# Patient Record
Sex: MALE | Race: WHITE | NOT HISPANIC OR LATINO | Employment: FULL TIME | URBAN - METROPOLITAN AREA
[De-identification: names, ages, dates, MRNs, and addresses within clinical notes are randomized per-mention and may not be internally consistent; named-entity substitution may affect disease eponyms.]

---
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
## 2018-04-25 ENCOUNTER — OFFICE VISIT (OUTPATIENT)
Dept: FAMILY MEDICINE | Facility: CLINIC | Age: 18
End: 2018-04-25
Payer: COMMERCIAL

## 2018-04-25 VITALS
SYSTOLIC BLOOD PRESSURE: 120 MMHG | HEART RATE: 89 BPM | RESPIRATION RATE: 20 BRPM | DIASTOLIC BLOOD PRESSURE: 71 MMHG | HEIGHT: 70 IN | TEMPERATURE: 98.6 F | BODY MASS INDEX: 20.33 KG/M2 | OXYGEN SATURATION: 96 % | WEIGHT: 142 LBS

## 2018-04-25 DIAGNOSIS — H10.13 ALLERGIC CONJUNCTIVITIS, BILATERAL: ICD-10-CM

## 2018-04-25 DIAGNOSIS — J30.1 CHRONIC SEASONAL ALLERGIC RHINITIS DUE TO POLLEN: Primary | ICD-10-CM

## 2018-04-25 PROCEDURE — 99213 OFFICE O/P EST LOW 20 MIN: CPT | Performed by: NURSE PRACTITIONER

## 2018-04-25 RX ORDER — FLUTICASONE PROPIONATE 50 MCG
2 SPRAY, SUSPENSION (ML) NASAL DAILY
Qty: 16 G | Refills: 6 | Status: SHIPPED | OUTPATIENT
Start: 2018-04-25 | End: 2020-04-09

## 2018-04-25 RX ORDER — OLOPATADINE HYDROCHLORIDE 2 MG/ML
1 SOLUTION/ DROPS OPHTHALMIC DAILY
Qty: 1 BOTTLE | Refills: 6 | Status: SHIPPED | OUTPATIENT
Start: 2018-04-25 | End: 2020-04-09

## 2018-04-25 ASSESSMENT — PAIN SCALES - GENERAL: PAINLEVEL: NO PAIN (0)

## 2018-04-25 NOTE — PROGRESS NOTES
SUBJECTIVE:   Iker dEge is a 18 year old male who presents to clinic today for the following health issues:      Chief Complaint   Patient presents with     Allergies     spring allergies             Problem list and histories reviewed & adjusted, as indicated.  Additional history: states he has spring allergies which are starting. Reports when things start to bloom he gets a runny nose which Flonase helps and itchy, red eyes which Patanol helps.  Reports using these two for the past 2 years and is hopeful this year they will work again.  He would consider seeing allergist if any further concerns.  Feels fine otherwise and offers no other worries.  He works as a cook.        Patient Active Problem List   Diagnosis     Epidermal inclusion cyst     Allergic conjunctivitis, bilateral     Seasonal allergic rhinitis     Past Surgical History:   Procedure Laterality Date     SURGICAL HISTORY OF -       bilateral myringotomy tubes       Social History   Substance Use Topics     Smoking status: Never Smoker     Smokeless tobacco: Never Used      Comment: no tobacco exposure     Alcohol use No     Family History   Problem Relation Age of Onset     Thyroid Disease Mother      has 1/2 thyroid     GASTROINTESTINAL DISEASE Mother      ulcerative colitis     Eye Disorder Paternal Grandmother      cataracts     Anemia Paternal Grandmother          Current Outpatient Prescriptions   Medication Sig Dispense Refill     fluticasone (FLONASE) 50 MCG/ACT spray Spray 2 sprays into both nostrils daily 16 g 6     ibuprofen 200 MG capsule Take 400 mg by mouth every 4 hours as needed for pain or fever 120 capsule      olopatadine HCl (PATADAY) 0.2 % SOLN Place 1 drop into both eyes daily 1 Bottle 6     No Known Allergies  BP Readings from Last 3 Encounters:   04/25/18 120/71   06/16/16 110/70   04/11/16 130/66    Wt Readings from Last 3 Encounters:   04/25/18 142 lb (64.4 kg)   06/16/16 137 lb (62.1 kg) (28 %)*   04/11/16 141 lb 6.4 oz  "(64.1 kg) (37 %)*     * Growth percentiles are based on [facility] 2-20 Years data.                    Reviewed and updated as needed this visit by clinical staff  Tobacco  Allergies  Meds  Med Hx  Surg Hx  Fam Hx  Soc Hx      Reviewed and updated as needed this visit by Provider          ROS: 10 point ROS neg other than the symptoms noted above in the HPI.    OBJECTIVE:     /71 (Cuff Size: Adult Regular)  Pulse 89  Temp 98.6  F (37  C) (Tympanic)  Resp 20  Ht 5' 9.5\" (1.765 m)  Wt 142 lb (64.4 kg)  SpO2 96%  BMI 20.67 kg/m2  Body mass index is 20.67 kg/(m^2).  GENERAL: healthy, alert and no distress  HENT: ear canals and TM's normal, pharynx without erythema  NECK: no adenopathy, no asymmetry  RESP: lungs clear to auscultation - no rales, rhonchi or wheezes  CV: regular rate and rhythm, normal S1 S2, no S3 or S4, no murmur  MS: no gross musculoskeletal defects noted      Diagnostic Test Results:  No results found for this or any previous visit (from the past 24 hour(s)).    ASSESSMENT/PLAN:             1. Chronic seasonal allergic rhinitis due to pollen    - fluticasone (FLONASE) 50 MCG/ACT spray; Spray 2 sprays into both nostrils daily  Dispense: 16 g; Refill: 6  Continue with previous successful treatment for seasonal spring allergies.   Follow up if symptoms persist or worsen and as needed.    2. Allergic conjunctivitis, bilateral    - olopatadine HCl (PATADAY) 0.2 % SOLN; Place 1 drop into both eyes daily  Dispense: 1 Bottle; Refill: 6    See Patient Instructions  Patient Instructions   Refills sent to pharmacy to address seasonal allergies.  If any further concerns with allergies, can see allergy specialist.  Follow up if symptoms persist or worsen and as needed.        Thank you for choosing [facility].  You may be receiving a survey in the mail from [employer] regarding your visit today.  Please take a few minutes to complete and return the survey to let us know how we are doing.      Our " Clinic hours are:  Mondays    7:20 am - 7 pm  Tues -  Fri  7:20 am - 5 pm    Clinic Phone: [phone]    The clinic lab opens at 7:30 am Mon - Fri and appointments are required.    [facility]  Ph. [phone]  Monday-Thursday 8 am - 7pm  Tues/Wed/Fri 8 am - 5:30 pm             CHRISTIAN Hamm CNP  [facility]

## 2018-04-25 NOTE — MR AVS SNAPSHOT
After Visit Summary   4/25/2018    Iker Edge    MRN: [id number]           Patient Information     Date Of Birth          2000        Visit Information        Provider Department      4/25/2018 11:40 AM Camille Edmonds APRN CNP [facility]        Today's Diagnoses     Chronic seasonal allergic rhinitis due to pollen    -  1    Allergic conjunctivitis, bilateral          Care Instructions    Refills sent to pharmacy to address seasonal allergies.  If any further concerns with allergies, can see allergy specialist.  Follow up if symptoms persist or worsen and as needed.        Thank you for choosing [facility].  You may be receiving a survey in the mail from [employer] regarding your visit today.  Please take a few minutes to complete and return the survey to let us know how we are doing.      Our Clinic hours are:  Mondays    7:20 am - 7 pm  Tues -  Fri  7:20 am - 5 pm    Clinic Phone: [phone]    The clinic lab opens at 7:30 am Mon - Fri and appointments are required.    [facility]  Ph. [phone]  Monday-Thursday 8 am - 7pm  Tues/Wed/Fri 8 am - 5:30 pm                 Follow-ups after your visit        Who to contact     If you have questions or need follow up information about today's clinic visit or your schedule please contact [facility] directly at [phone].  Normal or non-critical lab and imaging results will be communicated to you by MyChart, letter or phone within 4 business days after the clinic has received the results. If you do not hear from us within 7 days, please contact the clinic through MyChart or phone. If you have a critical or abnormal lab result, we will notify you by phone as soon as possible.  Submit refill requests through [employer] or call your pharmacy and they will forward the refill request to us. Please allow 3 business days for your refill to be completed.          Additional Information  "About Your Visit        QuantuModelinghart Information     [employer]omat lets you send messages to your doctor, view your test results, renew your prescriptions, schedule appointments and more. To sign up, go to [email or web address] . Click on \"Log in\" on the left side of the screen, which will take you to the Welcome page. Then click on \"Sign up Now\" on the right side of the page.     You will be asked to enter the access code listed below, as well as some personal information. Please follow the directions to create your username and password.     Your access code is: [email or web address]  Expires: 2018 12:00 PM     Your access code will  in 90 days. If you need help or a new code, please call your [place] clinic or [phone].        Care EveryWhere ID     This is your Care EveryWhere ID. This could be used by other organizations to access your [place] medical records  [id number]        Your Vitals Were     Pulse Temperature Respirations Height Pulse Oximetry BMI (Body Mass Index)    89 98.6  F (37  C) (Tympanic) 20 5' 9.5\" (1.765 m) 96% 20.67 kg/m2       Blood Pressure from Last 3 Encounters:   18 120/71   16 110/70   16 130/66    Weight from Last 3 Encounters:   18 142 lb (64.4 kg)   16 137 lb (62.1 kg) (28 %)*   16 141 lb 6.4 oz (64.1 kg) (37 %)*     * Growth percentiles are based on CDC 2-20 Years data.              Today, you had the following     No orders found for display         Where to get your medicines      These [facility] [address]  [address], [place] [address]    Hours:  [facility] Phone:  [phone]     fluticasone 50 MCG/ACT spray    olopatadine HCl 0.2 % Soln          Primary Care Provider Office Phone # Fax #    Paul Pacheco [phone] [address] [facility] [address]        Equal Access to Services     SOFYA" GAAR : Hadii edison page rodriguez Olson, waaxda luqadaha, qaybta kaalmada pj, stanford kelsey leopearl berman duncanconnie espinoza. So [facility] [phone].    ATENCIÓN: Si habla español, tiene a estevez disposición servicios gratuitos de asistencia lingüística. Llame al [phone].    We comply with applicable federal civil rights laws and [place] laws. We do not discriminate on the basis of race, color, national origin, age, disability, sex, sexual orientation, or gender identity.            Thank you!     Thank you for choosing [facility]  for your care. Our goal is always to provide you with excellent care. Hearing back from our patients is one way we can continue to improve our services. Please take a few minutes to complete the written survey that you may receive in the mail after your visit with us. Thank you!             Your Updated Medication List - Protect others around you: Learn how to safely use, store and throw away your medicines at www.disposemymeds.org.          This list is accurate as of 4/25/18 12:00 PM.  Always use your most recent med list.                   Brand Name Dispense Instructions for use Diagnosis    fluticasone 50 MCG/ACT spray    FLONASE    16 g    Spray 2 sprays into both nostrils daily    Chronic seasonal allergic rhinitis due to pollen       ibuprofen 200 MG capsule     120 capsule    Take 400 mg by mouth every 4 hours as needed for pain or fever        olopatadine HCl 0.2 % Soln    PATADAY    1 Bottle    Place 1 drop into both eyes daily    Allergic conjunctivitis, bilateral

## 2018-04-25 NOTE — PATIENT INSTRUCTIONS
Refills sent to pharmacy to address seasonal allergies.  If any further concerns with allergies, can see allergy specialist.  Follow up if symptoms persist or worsen and as needed.        Thank you for choosing [facility].  You may be receiving a survey in the mail from [employer] regarding your visit today.  Please take a few minutes to complete and return the survey to let us know how we are doing.      Our Clinic hours are:  Mondays    7:20 am - 7 pm  Tues -  Fri  7:20 am - 5 pm    Clinic Phone: [phone]    The clinic lab opens at 7:30 am Mon - Fri and appointments are required.    [facility]  Ph. [phone]  Monday-Thursday 8 am - 7pm  Tues/Wed/Fri 8 am - 5:30 pm

## 2018-06-14 ENCOUNTER — OFFICE VISIT (OUTPATIENT)
Dept: FAMILY MEDICINE | Facility: CLINIC | Age: 18
End: 2018-06-14
Payer: COMMERCIAL

## 2018-06-14 ENCOUNTER — TELEPHONE (OUTPATIENT)
Dept: OTHER | Facility: CLINIC | Age: 18
End: 2018-06-14

## 2018-06-14 VITALS
BODY MASS INDEX: 20.73 KG/M2 | SYSTOLIC BLOOD PRESSURE: 117 MMHG | TEMPERATURE: 96.5 F | WEIGHT: 144.8 LBS | DIASTOLIC BLOOD PRESSURE: 68 MMHG | HEART RATE: 57 BPM | HEIGHT: 70 IN

## 2018-06-14 DIAGNOSIS — I83.92 VARICOSE VEINS OF LEFT LOWER EXTREMITY: Primary | ICD-10-CM

## 2018-06-14 PROCEDURE — 99214 OFFICE O/P EST MOD 30 MIN: CPT | Performed by: FAMILY MEDICINE

## 2018-06-14 NOTE — PROGRESS NOTES
SUBJECTIVE:   Iker Edge is a 18 year old male who presents to clinic today for the following health issues:  Chief Complaint   Patient presents with     Musculoskeletal Problem     Pt here for left foot swelling and discoloration.       Joint Pain    Onset: Pt has always had some discoloration and swelling but has gotten progressively worse over the past year.     Description:   Location: left foot and ankle  Character: no pain at all    Intensity: no pain     Progression of Symptoms: worse    Accompanying Signs & Symptoms:  Other symptoms: swelling and discoloration of ankle and foot    History:   Previous similar pain: YES- pt has had forever but not to this extreme      Precipitating factors:   Trauma or overuse: no     Alleviating factors:  Improved by: nothing    Therapies Tried and outcome: none    Verified above history with patient.    Patient denies skin ulcer, foot/leg pain, chest pain or dyspnea.    Patient had normal venous duplex US in 1/2016.    Problem list and histories reviewed & adjusted, as indicated.  Additional history: as documented    Patient Active Problem List   Diagnosis     Epidermal inclusion cyst     Allergic conjunctivitis, bilateral     Seasonal allergic rhinitis     Past Surgical History:   Procedure Laterality Date     SURGICAL HISTORY OF -       bilateral myringotomy tubes       Social History   Substance Use Topics     Smoking status: Never Smoker     Smokeless tobacco: Never Used      Comment: no tobacco exposure     Alcohol use No     Family History   Problem Relation Age of Onset     Thyroid Disease Mother      has 1/2 thyroid     GASTROINTESTINAL DISEASE Mother      ulcerative colitis     Eye Disorder Paternal Grandmother      cataracts     Anemia Paternal Grandmother          Current Outpatient Prescriptions   Medication Sig Dispense Refill     fluticasone (FLONASE) 50 MCG/ACT spray Spray 2 sprays into both nostrils daily 16 g 6     olopatadine HCl (PATADAY) 0.2 % SOLN  "Place 1 drop into both eyes daily 1 Bottle 6     order for DME Equipment being ordered: one pair compression stockings (20-30 pressure) 1 each 1     ibuprofen 200 MG capsule Take 400 mg by mouth every 4 hours as needed for pain or fever 120 capsule      No Known Allergies    Reviewed and updated as needed this visit by clinical staff  Tobacco  Allergies  Meds  Problems  Med Hx  Surg Hx  Fam Hx  Soc Hx        Reviewed and updated as needed this visit by Provider  Allergies  Meds  Problems         ROS:  C: NEGATIVE for fever, chills, change in weight  I: see above  MUSCULOSKELETAL:see above  N: NEGATIVE for weakness, dizziness or paresthesias  H: NEGATIVE for bleeding problems    OBJECTIVE:                                                    /68  Pulse 57  Temp 96.5  F (35.8  C) (Tympanic)  Ht 5' 9.5\" (1.765 m)  Wt 144 lb 12.8 oz (65.7 kg)  BMI 21.08 kg/m2  Body mass index is 21.08 kg/(m^2).  GEN: alert, oriented x 3, NAD  SKIN: no ulcer; light purplish discoloration of the left foot (all toes and lateral half)  LEFT FOOT: moderate painless swelling with no pitting; moderate sized non-tender varicose veins mostly on lateral foot and lateral ankle; good pedal pulse; normal capillary refill time.  EXT: no pitting edema either side; no calf tenderness either side; right leg/foot with no varicose veins    Diagnostic test results:  Diagnostic Test Results:  none      ASSESSMENT/PLAN:                                                        ICD-10-CM    1. Varicose veins of left lower extremity I83.92 US Venous Competency Left     order for DME     VASCULAR SURGERY REFERRAL     Patient has reported progressively worsening varicose veins although painless  Suspect venous malformation vs venous incompetence.  Initial testing ordered as above. Consider MRI of foot.  Consult vasc surg clinic for further eval and management.  Compression stockings advised.  Elevate foot when resting.  Return precautions " discussed and given to patient.    Follow up with Provider - at [facility] clinic consult   Patient Instructions       To schedule the ultrasound of the left leg, call [phone].    Call vascular  at the number noted in the referral section of this summary.    Wear compression stockings daily - this prevents swelling and improves blood flow up the veins of the legs.      Thank you for choosing [facility].  You may be receiving a survey in the mail from [facility] regarding your visit today.  Please take a few minutes to complete and return the survey to let us know how we are doing.      If you have questions or concerns, please contact us via [employer] or you can contact your care team at [phone].    Our Clinic hours are:  Monday 6:40 am  to 7:00 pm  Tuesday -Friday 6:40 am to 5:00 pm    The [place] outpatient lab hours are:  Monday - Friday 6:10 am to 4:45 pm  Saturdays 7:00 am to 11:00 am  Appointments are required, call [phone]    If you have clinical questions after hours or would like to schedule an appointment,  call the clinic at [phone].    Self-Care for Spider and Varicose Veins  Your healthcare provider may suggest that you try self-care. Exercising and maintaining a healthy weight may keep problem veins from getting worse. Wearing elastic stockings and elevating your legs can help improve blood flow. Taking breaks when you sit or stand helps, too.     The top of the elastic stocking should be below the bend in your knee for a proper fit.   Exercising  Exercising is good for your veins because it improves blood flow. Walking, cycling, or swimming are great exercises for vein health. But be sure to check with your healthcare provider before starting any exercise program. Also, keep these hints in mind:    When exercising, start out slowly and try to build up to 30 minutes on most days.    Elevate your legs above heart level after exercise to keep blood from pooling in  veins.  Maintaining a healthy weight  Being overweight puts extra pressure on your veins. To maintain a healthy weight, try these tips:    Choose lean meats, fish, and skinless chicken.    Use low-fat dairy products.    Eat foods high in fiber, such as whole grains, fruits, and vegetables.    Cut down on sugar, salt, and saturated and trans fats.    Exercise regularly.  Wearing elastic stockings  Elastic stockings gently squeeze veins so blood flows upward. If you need elastic stockings, your healthcare provider can prescribe them for you. Follow your healthcare provider s advice about how and when to wear them. Elastic stockings come in several different levels of pressure. Ask your healthcare provider which level of pressure would benefit you the most.   Elevating your legs  Raising your legs above heart level will help relieve swelling and keep blood from pooling in veins. Try to elevate your legs for 15 to 20 minutes at the end of the day, and whenever you re relaxing. To make sure your legs are raised above heart level, prop them up on cushions or large pillows.  When sitting and standing  To keep blood moving when you have to sit or stand for long periods, try these tips:    At work, take walking breaks instead of coffee breaks. Walk during your lunch hour. Or try flexing your feet up and down 10 times each hour.    When standing, raise yourself up and down on your toes, or rock back and forth on your heels.  Date Last Reviewed: 5/1/2016 2000-2017 [employer]. [address], [place] [address]. All rights reserved. This information is not intended as a substitute for professional medical care. Always follow your healthcare professional's instructions.            Paul Pacheco MD  [facility]

## 2018-06-14 NOTE — PATIENT INSTRUCTIONS
To schedule the ultrasound of the left leg, call [phone].    Call vascular  at the number noted in the referral section of this summary.    Wear compression stockings daily - this prevents swelling and improves blood flow up the veins of the legs.      Thank you for choosing [facility].  You may be receiving a survey in the mail from Sasha Pedroza regarding your visit today.  Please take a few minutes to complete and return the survey to let us know how we are doing.      If you have questions or concerns, please contact us via [employer] or you can contact your care team at [phone].    Our Clinic hours are:  Monday 6:40 am  to 7:00 pm  Tuesday -Friday 6:40 am to 5:00 pm    The [place] outpatient lab hours are:  Monday - Friday 6:10 am to 4:45 pm  Saturdays 7:00 am to 11:00 am  Appointments are required, call [phone]    If you have clinical questions after hours or would like to schedule an appointment,  call the clinic at [phone].    Self-Care for Spider and Varicose Veins  Your healthcare provider may suggest that you try self-care. Exercising and maintaining a healthy weight may keep problem veins from getting worse. Wearing elastic stockings and elevating your legs can help improve blood flow. Taking breaks when you sit or stand helps, too.     The top of the elastic stocking should be below the bend in your knee for a proper fit.   Exercising  Exercising is good for your veins because it improves blood flow. Walking, cycling, or swimming are great exercises for vein health. But be sure to check with your healthcare provider before starting any exercise program. Also, keep these hints in mind:    When exercising, start out slowly and try to build up to 30 minutes on most days.    Elevate your legs above heart level after exercise to keep blood from pooling in veins.  Maintaining a healthy weight  Being overweight puts extra pressure on your veins. To maintain a healthy weight, try  these tips:    Choose lean meats, fish, and skinless chicken.    Use low-fat dairy products.    Eat foods high in fiber, such as whole grains, fruits, and vegetables.    Cut down on sugar, salt, and saturated and trans fats.    Exercise regularly.  Wearing elastic stockings  Elastic stockings gently squeeze veins so blood flows upward. If you need elastic stockings, your healthcare provider can prescribe them for you. Follow your healthcare provider s advice about how and when to wear them. Elastic stockings come in several different levels of pressure. Ask your healthcare provider which level of pressure would benefit you the most.   Elevating your legs  Raising your legs above heart level will help relieve swelling and keep blood from pooling in veins. Try to elevate your legs for 15 to 20 minutes at the end of the day, and whenever you re relaxing. To make sure your legs are raised above heart level, prop them up on cushions or large pillows.  When sitting and standing  To keep blood moving when you have to sit or stand for long periods, try these tips:    At work, take walking breaks instead of coffee breaks. Walk during your lunch hour. Or try flexing your feet up and down 10 times each hour.    When standing, raise yourself up and down on your toes, or rock back and forth on your heels.  Date Last Reviewed: 5/1/2016 2000-2017 [employer]. [address] [place] [address], [place] [address]. All rights reserved. This information is not intended as a substitute for professional medical care. Always follow your healthcare professional's instructions.

## 2018-06-14 NOTE — MR AVS SNAPSHOT
After Visit Summary   6/14/2018    Iker Edge    MRN: [id number]           Patient Information     Date Of Birth          2000        Visit Information        Provider Department      6/14/2018 8:20 AM Paul Pacheco MD [facility]        Today's Diagnoses     Varicose veins of left lower extremity    -  1      Care Instructions      To schedule the ultrasound of the left leg, call [phone].    Call vascular  at the number noted in the referral section of this summary.    Wear compression stockings daily - this prevents swelling and improves blood flow up the veins of the legs.      Thank you for choosing [facility] [place].  You may be receiving a survey in the mail from [employer] regarding your visit today.  Please take a few minutes to complete and return the survey to let us know how we are doing.      If you have questions or concerns, please contact us via [employer] or you can contact your care team at [phone].    Our Clinic hours are:  Monday 6:40 am  to 7:00 pm  Tuesday -Friday 6:40 am to 5:00 pm    The [place] outpatient lab hours are:  Monday - Friday 6:10 am to 4:45 pm  Saturdays 7:00 am to 11:00 am  Appointments are required, call [phone]    If you have clinical questions after hours or would like to schedule an appointment,  call the clinic at [phone].    Self-Care for Spider and Varicose Veins  Your healthcare provider may suggest that you try self-care. Exercising and maintaining a healthy weight may keep problem veins from getting worse. Wearing elastic stockings and elevating your legs can help improve blood flow. Taking breaks when you sit or stand helps, too.     The top of the elastic stocking should be below the bend in your knee for a proper fit.   Exercising  Exercising is good for your veins because it improves blood flow. Walking, cycling, or swimming are great exercises for vein health. But be sure to check with your  healthcare provider before starting any exercise program. Also, keep these hints in mind:    When exercising, start out slowly and try to build up to 30 minutes on most days.    Elevate your legs above heart level after exercise to keep blood from pooling in veins.  Maintaining a healthy weight  Being overweight puts extra pressure on your veins. To maintain a healthy weight, try these tips:    Choose lean meats, fish, and skinless chicken.    Use low-fat dairy products.    Eat foods high in fiber, such as whole grains, fruits, and vegetables.    Cut down on sugar, salt, and saturated and trans fats.    Exercise regularly.  Wearing elastic stockings  Elastic stockings gently squeeze veins so blood flows upward. If you need elastic stockings, your healthcare provider can prescribe them for you. Follow your healthcare provider s advice about how and when to wear them. Elastic stockings come in several different levels of pressure. Ask your healthcare provider which level of pressure would benefit you the most.   Elevating your legs  Raising your legs above heart level will help relieve swelling and keep blood from pooling in veins. Try to elevate your legs for 15 to 20 minutes at the end of the day, and whenever you re relaxing. To make sure your legs are raised above heart level, prop them up on cushions or large pillows.  When sitting and standing  To keep blood moving when you have to sit or stand for long periods, try these tips:    At work, take walking breaks instead of coffee breaks. Walk during your lunch hour. Or try flexing your feet up and down 10 times each hour.    When standing, raise yourself up and down on your toes, or rock back and forth on your heels.  Date Last Reviewed: 5/1/2016 2000-2017 [employer]. [address] [place] [address], [place] [address]. All rights reserved. This information is not intended as a substitute for professional medical care. Always follow your healthcare  professional's instructions.                Follow-ups after your visit        Additional Services     VASCULAR SURGERY REFERRAL       Your provider has referred you to: **Vascular [facility] Services [phone] - Varicose Veins & Other: venous competency already ordered and is pending at time of this referral    [email or web address]    Please be aware that coverage of these services is subject to the terms and limitations of your health insurance plan.  Call member services at your health plan with any benefit or coverage questions.      Please bring the following with you to your appointment:    (1) Any X-Rays, CTs or MRIs which have been performed.  Contact the facility where they were done to arrange for  prior to your scheduled appointment.    (2) List of current medications   (3) This referral request   (4) Any documents/labs given to you for this referral                  Follow-up notes from your care team     Return if symptoms worsen or fail to improve.      Future tests that were ordered for you today     Open Future Orders        Priority Expected Expires Ordered    US Venous Competency Left Routine  6/14/2019 6/14/2018            Who to contact     If you have questions or need follow up information about today's clinic visit or your schedule please contact [facility] directly at [phone].  Normal or non-critical lab and imaging results will be communicated to you by MyChart, letter or phone within 4 business days after the clinic has received the results. If you do not hear from us within 7 days, please contact the clinic through MyChart or phone. If you have a critical or abnormal lab result, we will notify you by phone as soon as possible.  Submit refill requests through [employer] or call your pharmacy and they will forward the refill request to us. Please allow 3 business days for your refill to be completed.          Additional Information  "About Your Visit        Care EveryWhere ID     This is your Care EveryWhere ID. This could be used by other organizations to access your [place] medical records  [id number]        Your Vitals Were     Pulse Temperature Height BMI (Body Mass Index)          57 96.5  F (35.8  C) (Tympanic) 5' 9.5\" (1.765 m) 21.08 kg/m2         Blood Pressure from Last 3 Encounters:   06/14/18 117/68   04/25/18 120/71   06/16/16 110/70    Weight from Last 3 Encounters:   06/14/18 144 lb 12.8 oz (65.7 kg)   04/25/18 142 lb (64.4 kg)   06/16/16 137 lb (62.1 kg) (28 %)*     * Growth percentiles are based on [facility] 2-20 Years data.              We Performed the Following     VASCULAR SURGERY REFERRAL          Today's Medication Changes          These changes are accurate as of 6/14/18  8:41 AM.  If you have any questions, ask your nurse or doctor.               Start taking these medicines.        Dose/Directions    order for DME   Used for:  Varicose veins of left lower extremity   Started by:  Paul Pacheco MD        Equipment being ordered: one pair compression stockings (20-30 pressure)   Quantity:  1 each   Refills:  1            Where to get your medicines      Some of these will need a paper prescription and others can be bought over the counter.  Ask your nurse if you have questions.     Bring a paper prescription for each of these medications     order for DME                Primary Care Provider Office Phone # Fax #    Paul Pacheco [phone] [address] [address]        Equal Access to Services     SOFYA ANDERSON : Hadii edison hookso Sojustin, waaxda luqadaha, qaybta kaalmada stanford oliva. So [facility] [phone].    ATENCIÓN: Si habla español, tiene a estevez disposición servicios gratuitos de asistencia lingüística. Llame al [phone].    We comply with applicable federal civil rights laws and [place] laws. We do " not discriminate on the basis of race, color, national origin, age, disability, sex, sexual orientation, or gender identity.            Thank you!     Thank you for choosing [facility]  for your care. Our goal is always to provide you with excellent care. Hearing back from our patients is one way we can continue to improve our services. Please take a few minutes to complete the written survey that you may receive in the mail after your visit with us. Thank you!             Your Updated Medication List - Protect others around you: Learn how to safely use, store and throw away your medicines at www.disposemymeds.org.          This list is accurate as of 6/14/18  8:41 AM.  Always use your most recent med list.                   Brand Name Dispense Instructions for use Diagnosis    fluticasone 50 MCG/ACT spray    FLONASE    16 g    Spray 2 sprays into both nostrils daily    Chronic seasonal allergic rhinitis due to pollen       ibuprofen 200 MG capsule     120 capsule    Take 400 mg by mouth every 4 hours as needed for pain or fever        olopatadine HCl 0.2 % Soln    PATADAY    1 Bottle    Place 1 drop into both eyes daily    Allergic conjunctivitis, bilateral       order for DME     1 each    Equipment being ordered: one pair compression stockings (20-30 pressure)    Varicose veins of left lower extremity

## 2018-06-14 NOTE — TELEPHONE ENCOUNTER
"Referral received via EPIC \"in box\", per  guidelines referral forwarded to [facility].    Harriett HASSANN, RN    "

## 2018-06-27 ENCOUNTER — HOSPITAL ENCOUNTER (OUTPATIENT)
Dept: ULTRASOUND IMAGING | Facility: CLINIC | Age: 18
Discharge: HOME | End: 2018-06-27
Attending: FAMILY MEDICINE | Admitting: FAMILY MEDICINE
Payer: COMMERCIAL

## 2018-06-27 DIAGNOSIS — I83.92 VARICOSE VEINS OF LEFT LOWER EXTREMITY: ICD-10-CM

## 2018-06-27 PROCEDURE — 93971 EXTREMITY STUDY: CPT | Mod: LT

## 2018-06-28 DIAGNOSIS — I87.2 VENOUS INSUFFICIENCY OF LEFT LEG: Primary | ICD-10-CM

## 2018-07-10 ENCOUNTER — OFFICE VISIT (OUTPATIENT)
Dept: VASCULAR SURGERY | Facility: CLINIC | Age: 18
End: 2018-07-10
Payer: COMMERCIAL

## 2018-07-10 VITALS — HEART RATE: 70 BPM | SYSTOLIC BLOOD PRESSURE: 116 MMHG | DIASTOLIC BLOOD PRESSURE: 74 MMHG | RESPIRATION RATE: 16 BRPM

## 2018-07-10 DIAGNOSIS — I87.2 VENOUS (PERIPHERAL) INSUFFICIENCY: ICD-10-CM

## 2018-07-10 DIAGNOSIS — I83.892 VARICOSE VEINS OF LEFT LEG WITH EDEMA: ICD-10-CM

## 2018-07-10 DIAGNOSIS — Q87.2 KLIPPEL TRENAUNAY SYNDROME: Primary | ICD-10-CM

## 2018-07-10 PROCEDURE — 99203 OFFICE O/P NEW LOW 30 MIN: CPT | Performed by: SURGERY

## 2018-07-10 NOTE — MR AVS SNAPSHOT
After Visit Summary   7/10/2018    Iker Edge    MRN: [id number]           Patient Information     Date Of Birth          2000        Visit Information        Provider Department      7/10/2018 9:30 AM Jamal Peralta MD [facility]        Today's Diagnoses     Klippel Trenaunay syndrome    -  1    Venous (peripheral) insufficiency        Varicose veins of left leg with edema           Follow-ups after your visit        Follow-up notes from your care team     Return if symptoms worsen or fail to improve.      Who to contact     If you have questions or need follow up information about today's clinic visit or your schedule please contact [facility] directly at [phone].  Normal or non-critical lab and imaging results will be communicated to you by MyChart, letter or phone within 4 business days after the clinic has received the results. If you do not hear from us within 7 days, please contact the clinic through MyChart or phone. If you have a critical or abnormal lab result, we will notify you by phone as soon as possible.  Submit refill requests through [employer] or call your pharmacy and they will forward the refill request to us. Please allow 3 business days for your refill to be completed.          Additional Information About Your Visit        Care EveryWhere ID     This is your Care EveryWhere ID. This could be used by other organizations to access your [place] medical records  [id number]        Your Vitals Were     Pulse Respirations                70 16           Blood Pressure from Last 3 Encounters:   07/10/18 116/74   06/14/18 117/68   04/25/18 120/71    Weight from Last 3 Encounters:   06/14/18 144 lb 12.8 oz (65.7 kg)   04/25/18 142 lb (64.4 kg)   06/16/16 137 lb (62.1 kg) (28 %)*     * Growth percentiles are based on CDC 2-20 Years data.              Today, you had the following     No orders found for display       Primary Care Provider Office  Phone # Fax #    Paul Pacheco [phone] [address] [facility] [address]        Equal Access to Services     SOFYA ANDERSON : Tristian Olson, warayshawnda florarogelioha, maureenta kajessda pj, stanford lenorain hayaapearl fernandovic abbott radha espinoza. So [facility] [phone].    ATENCIÓN: Si habla español, tiene a estevez disposición servicios gratuitos de asistencia lingüística. Llame al [phone].    We comply with applicable federal civil rights laws and [place] laws. We do not discriminate on the basis of race, color, national origin, age, disability, sex, sexual orientation, or gender identity.            Thank you!     Thank you for choosing [facility]  for your care. Our goal is always to provide you with excellent care. Hearing back from our patients is one way we can continue to improve our services. Please take a few minutes to complete the written survey that you may receive in the mail after your visit with us. Thank you!             Your Updated Medication List - Protect others around you: Learn how to safely use, store and throw away your medicines at www.disposemymeds.org.          This list is accurate as of 7/10/18 10:15 AM.  Always use your most recent med list.                   Brand Name Dispense Instructions for use Diagnosis    fluticasone 50 MCG/ACT spray    FLONASE    16 g    Spray 2 sprays into both nostrils daily    Chronic seasonal allergic rhinitis due to pollen       ibuprofen 200 MG capsule     120 capsule    Take 400 mg by mouth every 4 hours as needed for pain or fever        olopatadine HCl 0.2 % Soln    PATADAY    1 Bottle    Place 1 drop into both eyes daily    Allergic conjunctivitis, bilateral       order for DME     1 each    Equipment being ordered: one pair compression stockings (20-30 pressure)    Varicose veins of left lower extremity

## 2018-07-10 NOTE — LETTER
[facility] [place] [address] [place] [address]  [place] [address]  Phone: [phone]  Fax: [phone]    VASCULAR SURGERY CLINIC CONSULTATION   VASCULAR SURGEON: Jamal Peralta MD     LOCATION:  [facility]     Iker Edge   Medical Record #:  [id number]  YOB: 2000  Age:  18 year old      Date of Service: 7/10/2018     PRIMARY CARE PROVIDER: Paul Pacheco     Reason for visit:  Left leg varicose veins, port-wine stain and swelling.     IMPRESSION:  17 yo male with left leg hypertrophy, varicose veins and port wine stain consistent with Klippel Trenaunay Syndrome.     RECOMMENDATION:  I've discussed with Iker that this is most likely KTS since it's been present since birth and has the features of limb hypertrophy, varicose veins and port wine staining.  He was told at one point that this is most likely the diagnosis.  I've recommended to start with left thigh high compression 30-40 mmHg to be worn as much as possible to assist with the swelling.  I've also recommended that he may need to transition to waist high compression at some point if this doesn't provide adequate compression.  He would benefit from a left lower extremity MRI as well to evaluate for arterial venous malformations which would also be treated with compression and should be followed by a specialist in malformation treatment.  Dr. Brooks at [facility] does follow patients with malformations.  The long term treatment for his condition is compression therapy and only surgical intervention if he develops complications from his condition.       HPI:  Iker Edge is a 18 year old male who was seen today in consultation by Dr. Pacheco regarding left lower extremity swelling, port wine staining and varicose veins.  He has had this present since birth and was told at one point that this was most likely Klippel Trenaunay Syndrome (KTS).  He has worn  compression in the left lower leg his whole life with decent relief of the swelling.  Overall, he has minimal discomfort and no complications associated with his varicose veins.  He says his family history is significant for varicose veins in his mother and her side of the family.   No history of clotting disorders or DVT.      MEDS:    Current Outpatient Prescriptions:      fluticasone (FLONASE) 50 MCG/ACT spray, Spray 2 sprays into both nostrils daily, Disp: 16 g, Rfl: 6     ibuprofen 200 MG capsule, Take 400 mg by mouth every 4 hours as needed for pain or fever, Disp: 120 capsule, Rfl:      olopatadine HCl (PATADAY) 0.2 % SOLN, Place 1 drop into both eyes daily, Disp: 1 Bottle, Rfl: 6     order for DME, Equipment being ordered: one pair compression stockings (20-30 pressure), Disp: 1 each, Rfl: 1     EXAM:  GENERAL: This is a well-developed 18 year old male who appears his stated age  EYES: Grossly normal.  MOUTH: Buccal mucosa normal   CARDIAC:  NS1 S2, No Murmur  CHEST/LUNG:  Clear lung fields bilaterally   GASTROINTESINAL (ABDOMEN): Soft, non-tender, B/S present, no pulsatile mass  MUSCULOSKELETAL: Grossly normal and both lower extremities are intact.  HEME/LYMPH: No lymphedema.  NEUROLOGIC: Focally intact, Alert and oriented x 3.   PSYCH: appropriate affect  INTEGUMENT: No open lesions or ulcers.  He has hypertrophy of the left lower leg.  Large clusters of varicose veins in the left dorsal foot.  Port-wine staining of the plantar foot and 2-5 toes.  He also has port-wine stain on the posterior medial upper thigh.  There are some varicose veins scattered throughout the left posterior thigh < 3 mm in size.  No evidence of phlebitis or hemosiderin changes of the skin.  Pulse Exam:      DP:                Left 2                        Right  2      PT:                 Left 2                        Right  2     DIAGNOSTIC STUDIES:      Images:  Us Venous Competency Left     Result Date: 6/27/2018  VENOUS  ULTRASOUND LEG(S)  6/27/2018 10:14 AM HISTORY: Varicose veins of left lower extremity. COMPARISON: 1/5/2016 FINDINGS: Examination of the deep veins with graded compression and color flow Doppler with spectral wave form analysis was performed. Images show no evidence of thrombus in the left common femoral vein, femoral vein, popliteal vein or calf veins. There is deep venous insufficiency in the right common femoral, peripheral femoral and popliteal veins with a reflux time of up to 3.4 seconds. No superficial venous insufficiency in the left great saphenous vein or anterior accessory saphenous vein.      IMPRESSION: 1. No deep vein thrombosis in the left lower extremity. 2. Deep venous insufficiency in the left common femoral, peripheral femoral and popliteal veins. 3. No superficial venous insufficiency in the left lower extremity. DO Jamal RAMOS MD  VASCULAR SURGERY

## 2018-07-10 NOTE — PROGRESS NOTES
VASCULAR SURGERY CLINIC CONSULTATION    VASCULAR SURGEON: Jamal Peralta MD    LOCATION:  [facility]    Iker Edge   Medical Record #:  [id number]  YOB: 2000  Age:  18 year old     Date of Service: 7/10/2018    PRIMARY CARE PROVIDER: Paul Pacheco      Reason for visit:  Left leg varicose veins, port-wine stain and swelling.    IMPRESSION:  17 yo male with left leg hypertrophy, varicose veins and port wine stain consistent with Klippel Trenaunay Syndrome.    RECOMMENDATION:  I've discussed with Iker that this is most likely KTS since it's been present since birth and has the features of limb hypertrophy, varicose veins and port wine staining.  He was told at one point that this is most likely the diagnosis.  I've recommended to start with left thigh high compression 30-40 mmHg to be worn as much as possible to assist with the swelling.  I've also recommended that he may need to transition to waist high compression at some point if this doesn't provide adequate compression.  He would benefit from a left lower extremity MRI as well to evaluate for arterial venous malformations which would also be treated with compression and should be followed by a specialist in malformation treatment.  Dr. Brooks at [facility] does follow patients with malformations.  The long term treatment for his condition is compression therapy and only surgical intervention if he develops complications from his condition.      HPI:  Iker Edge is a 18 year old male who was seen today in consultation by Dr. Pacheco regarding left lower extremity swelling, port wine staining and varicose veins.  He has had this present since birth and was told at one point that this was most likely Klippel Trenaunay Syndrome (KTS).  He has worn compression in the left lower leg his whole life with decent relief of the swelling.  Overall, he has minimal discomfort and no  complications associated with his varicose veins.  He says his family history is significant for varicose veins in his mother and her side of the family.   No history of clotting disorders or DVT.     PHH:    Past Medical History:   Diagnosis Date     Unspecified asthma(493.90)     reactive airway        Past Surgical History:   Procedure Laterality Date     SURGICAL HISTORY OF -       bilateral myringotomy tubes       ALLERGIES:  Review of patient's allergies indicates no known allergies.    MEDS:    Current Outpatient Prescriptions:      fluticasone (FLONASE) 50 MCG/ACT spray, Spray 2 sprays into both nostrils daily, Disp: 16 g, Rfl: 6     ibuprofen 200 MG capsule, Take 400 mg by mouth every 4 hours as needed for pain or fever, Disp: 120 capsule, Rfl:      olopatadine HCl (PATADAY) 0.2 % SOLN, Place 1 drop into both eyes daily, Disp: 1 Bottle, Rfl: 6     order for DME, Equipment being ordered: one pair compression stockings (20-30 pressure), Disp: 1 each, Rfl: 1    SOCIAL HABITS:    History   Smoking Status     Never Smoker   Smokeless Tobacco     Never Used     Comment: no tobacco exposure       Alcohol use No     History   Drug Use No       FAMILY HISTORY:    Family History   Problem Relation Age of Onset     Thyroid Disease Mother      has 1/2 thyroid     GASTROINTESTINAL DISEASE Mother      ulcerative colitis     Eye Disorder Paternal Grandmother      cataracts     Anemia Paternal Grandmother        REVIEW OF SYSTEMS:    A 12 point ROS was reviewed and except for what is listed in the HPI above, all others are negative    PE:  /74 (BP Location: Left arm, Patient Position: Chair, Cuff Size: Adult Regular)  Pulse 70  Resp 16  Wt Readings from Last 1 Encounters:   06/14/18 144 lb 12.8 oz (65.7 kg)     There is no height or weight on file to calculate BMI.    EXAM:  GENERAL: This is a well-developed 18 year old male who appears his stated age  EYES: Grossly normal.  MOUTH: Buccal mucosa normal   CARDIAC:   NS1 S2, No Murmur  CHEST/LUNG:  Clear lung fields bilaterally   GASTROINTESINAL (ABDOMEN): Soft, non-tender, B/S present, no pulsatile mass  MUSCULOSKELETAL: Grossly normal and both lower extremities are intact.  HEME/LYMPH: No lymphedema.  NEUROLOGIC: Focally intact, Alert and oriented x 3.   PSYCH: appropriate affect  INTEGUMENT: No open lesions or ulcers.  He has hypertrophy of the left lower leg.  Large clusters of varicose veins in the left dorsal foot.  Port-wine staining of the plantar foot and 2-5 toes.  He also has port-wine stain on the posterior medial upper thigh.  There are some varicose veins scattered throughout the left posterior thigh < 3 mm in size.  No evidence of phlebitis or hemosiderin changes of the skin.  Pulse Exam:     DP: Left 2   Right  2     PT:   Left 2   Right  2          DIAGNOSTIC STUDIES:     Images:  Us Venous Competency Left    Result Date: 6/27/2018  VENOUS ULTRASOUND LEG(S)  6/27/2018 10:14 AM HISTORY: Varicose veins of left lower extremity. COMPARISON: 1/5/2016 FINDINGS: Examination of the deep veins with graded compression and color flow Doppler with spectral wave form analysis was performed. Images show no evidence of thrombus in the left common femoral vein, femoral vein, popliteal vein or calf veins. There is deep venous insufficiency in the right common femoral, peripheral femoral and popliteal veins with a reflux time of up to 3.4 seconds. No superficial venous insufficiency in the left great saphenous vein or anterior accessory saphenous vein.     IMPRESSION: 1. No deep vein thrombosis in the left lower extremity. 2. Deep venous insufficiency in the left common femoral, peripheral femoral and popliteal veins. 3. No superficial venous insufficiency in the left lower extremity. SNEHA NETTLES,     LABS:      Sodium   Date Value Ref Range Status   04/01/2008 143 133 - 143 mmol/L Final     Urea Nitrogen   Date Value Ref Range Status   04/01/2008 15 5 - 24 mg/dL Final      Hemoglobin   Date Value Ref Range Status   04/01/2008 14.2 11.7 - 15.7 g/dL Final   02/28/2005 12.4 10.5 - 14.0 g/dL Final     Platelet Count   Date Value Ref Range Status   04/01/2008 314 150 - 450 10e9/L Final   02/28/2005 300 150 - 450 10e9/L Final       Jamal Peralta MD  VASCULAR SURGERY

## 2018-10-24 ENCOUNTER — OFFICE VISIT (OUTPATIENT)
Dept: FAMILY MEDICINE | Facility: CLINIC | Age: 18
End: 2018-10-24
Payer: COMMERCIAL

## 2018-10-24 VITALS
TEMPERATURE: 96.9 F | OXYGEN SATURATION: 99 % | HEIGHT: 70 IN | RESPIRATION RATE: 16 BRPM | SYSTOLIC BLOOD PRESSURE: 138 MMHG | DIASTOLIC BLOOD PRESSURE: 74 MMHG | WEIGHT: 134.6 LBS | BODY MASS INDEX: 19.27 KG/M2 | HEART RATE: 58 BPM

## 2018-10-24 DIAGNOSIS — Z23 NEED FOR PROPHYLACTIC VACCINATION AND INOCULATION AGAINST INFLUENZA: ICD-10-CM

## 2018-10-24 DIAGNOSIS — F32.A ANXIETY AND DEPRESSION: Primary | ICD-10-CM

## 2018-10-24 DIAGNOSIS — F41.9 ANXIETY AND DEPRESSION: Primary | ICD-10-CM

## 2018-10-24 PROCEDURE — 90471 IMMUNIZATION ADMIN: CPT | Performed by: FAMILY MEDICINE

## 2018-10-24 PROCEDURE — 90686 IIV4 VACC NO PRSV 0.5 ML IM: CPT | Performed by: FAMILY MEDICINE

## 2018-10-24 PROCEDURE — 99214 OFFICE O/P EST MOD 30 MIN: CPT | Mod: 25 | Performed by: FAMILY MEDICINE

## 2018-10-24 ASSESSMENT — ANXIETY QUESTIONNAIRES
2. NOT BEING ABLE TO STOP OR CONTROL WORRYING: NEARLY EVERY DAY
GAD7 TOTAL SCORE: 14
6. BECOMING EASILY ANNOYED OR IRRITABLE: SEVERAL DAYS
5. BEING SO RESTLESS THAT IT IS HARD TO SIT STILL: NOT AT ALL
1. FEELING NERVOUS, ANXIOUS, OR ON EDGE: NEARLY EVERY DAY
IF YOU CHECKED OFF ANY PROBLEMS ON THIS QUESTIONNAIRE, HOW DIFFICULT HAVE THESE PROBLEMS MADE IT FOR YOU TO DO YOUR WORK, TAKE CARE OF THINGS AT HOME, OR GET ALONG WITH OTHER PEOPLE: VERY DIFFICULT
7. FEELING AFRAID AS IF SOMETHING AWFUL MIGHT HAPPEN: NEARLY EVERY DAY
3. WORRYING TOO MUCH ABOUT DIFFERENT THINGS: MORE THAN HALF THE DAYS

## 2018-10-24 ASSESSMENT — PATIENT HEALTH QUESTIONNAIRE - PHQ9: 5. POOR APPETITE OR OVEREATING: MORE THAN HALF THE DAYS

## 2018-10-24 NOTE — PROGRESS NOTES
SUBJECTIVE:   Iker Edge is a 18 year old male who presents to clinic today for the following health issues:      Abnormal Mood Symptoms  Onset: 2 years    Description:   Depression: YES  Anxiety: YES    Accompanying Signs & Symptoms:  Still participating in activities that you used to enjoy: no  Fatigue: YES- intermittent  Irritability: no  Difficulty concentrating: YES  Changes in appetite: YES  Problems with sleep: YES  Heart racing/beating fast : no  Thoughts of hurting yourself or others: none  Cries spontaneously: YES    History:   Recent stress: YES  Prior depression hospitalization: None  Family history of depression: YES- mother  Family history of anxiety: YES- mother    Precipitating factors:   Alcohol/drug use: no    Alleviating factors:  none    Therapies Tried and outcome: None    Further hx obtained by provider:  Patient reports he has difficulty talking about his feelings/emotions.  He states been worsening in the last year.  Patient denies any significant life event.  Patient reports he is happy with his job.  Patient states he has confided with his friend and mother, but nobody else.  He also states he has only been to counseling for about 2 months in 10th grade for mood symptoms.  Patient denies being on antidepressant in the past.  He denies any recurreing theme of worries or triggers for his depressed mood.    Problem list and histories reviewed & adjusted, as indicated.  Additional history: as documented    Patient Active Problem List   Diagnosis     Epidermal inclusion cyst     Allergic conjunctivitis, bilateral     Seasonal allergic rhinitis     Klippel Trenaunay syndrome     Venous (peripheral) insufficiency     Varicose veins of left leg with edema     Past Surgical History:   Procedure Laterality Date     SURGICAL HISTORY OF -       bilateral myringotomy tubes       Social History   Substance Use Topics     Smoking status: Never Smoker     Smokeless tobacco: Never Used      Comment: no  "tobacco exposure     Alcohol use No     Family History   Problem Relation Age of Onset     Thyroid Disease Mother      has 1/2 thyroid     GASTROINTESTINAL DISEASE Mother      ulcerative colitis     Eye Disorder Paternal Grandmother      cataracts     Anemia Paternal Grandmother          Current Outpatient Prescriptions   Medication Sig Dispense Refill     ibuprofen 200 MG capsule Take 400 mg by mouth every 4 hours as needed for pain or fever 120 capsule      sertraline (ZOLOFT) 50 MG tablet Take 1/2 tablet (25 mg) for 1-2 weeks, then increase to 1 tablet orally daily 30 tablet 0     fluticasone (FLONASE) 50 MCG/ACT spray Spray 2 sprays into both nostrils daily (Patient not taking: Reported on 10/24/2018) 16 g 6     olopatadine HCl (PATADAY) 0.2 % SOLN Place 1 drop into both eyes daily (Patient not taking: Reported on 10/24/2018) 1 Bottle 6     No Known Allergies    Reviewed and updated as needed this visit by clinical staff       Reviewed and updated as needed this visit by Provider         ROS:  C: NEGATIVE for fever, chills, change in weight  I: NEGATIVE for worrisome rashes, moles or lesions  E: NEGATIVE for vision changes or irritation  E/M: NEGATIVE for ear, mouth and throat problems  R: NEGATIVE for significant cough or SOB  CV: NEGATIVE for chest pain, palpitations or peripheral edema  GI: NEGATIVE for nausea, abdominal pain, heartburn, or change in bowel habits  : NEGATIVE for frequency, dysuria, or hematuria  N: NEGATIVE for weakness, dizziness or paresthesias  E: NEGATIVE for temperature intolerance, skin/hair changes  PSYCHIATRIC: see above    OBJECTIVE:                                                    /74 (BP Location: Right arm, Patient Position: Chair, Cuff Size: Adult Regular)  Pulse 58  Temp 96.9  F (36.1  C) (Tympanic)  Resp 16  Ht 5' 9.5\" (1.765 m)  Wt 134 lb 9.6 oz (61.1 kg)  SpO2 99%  BMI 19.59 kg/m2  Body mass index is 19.59 kg/(m^2).  GENERAL:  alert and no distress  EYES: no " icterus, PERRLA  SKIN: no jaundice/rash  NEURO: no tremors  PSYCH: well-kempt, linear thought process, normal speech, good insight/judgement, depressed mood, somewhat flat and labile (more tearful) affect, no suicidality, no aggression, no hallucination    Diagnostic test results:  Diagnostic Test Results:  none      ASSESSMENT/PLAN:                                                        ICD-10-CM    1. Anxiety and depression F41.9 sertraline (ZOLOFT) 50 MG tablet    F32.9 MENTAL HEALTH REFERRAL  - Adult; Outpatient Treatment; Individual/Couples/Family/Group Therapy/Health Psychology; G: [facility] [phone]; We will contact you to schedule the appointment or please call with any questions   2. Need for prophylactic vaccination and inoculation against influenza Z23 FLU VACCINE, SPLIT VIRUS, IM (QUADRIVALENT) [76100]- >3 YRS     Vaccine Administration, Initial [32181]     Uncontrolled depression, worsened in the last few months.  Non-suicidal. No specific triggers.  Counseling recommended.  Discussed options for medical treatment. Discussed possible ADR to meds.  Patient agreed with starting sertraline, and CBT referral.  Suicidal precautions given.  Lifestyle changes advised to help with mood.  Return precautions discussed and given to patient.    Follow up with Provider - 3 weeks   Patient Instructions   Start sertraline as prescribed.  Follow up in 3 weeks with Dr. Pacheco for recheck.    You will be contacted in 1-2 business days to get a schedule for the behavior therapist    Take your medication as directed.  Full effect/benefit of the medications may not be evident until a few weeks after start.  Treatment of depression/anxiety involves also counseling. Take adequate sleep, and exercise regularly.  If you experience suicidal thoughts, thoughts of hurting others or hallucinations, see a doctor right away.        Thank you for choosing [facility].  You may be receiving a survey in the  mail from [facility] regarding your visit today.  Please take a few minutes to complete and return the survey to let us know how we are doing.      If you have questions or concerns, please contact us via [employer] or you can contact your care team at [phone].    Our Clinic hours are:  Monday 6:40 am  to 7:00 pm  Tuesday -Friday 6:40 am to 5:00 pm    The [place] outpatient lab hours are:  Monday - Friday 6:10 am to 4:45 pm  Saturdays 7:00 am to 11:00 am  Appointments are required, call [phone]    If you have clinical questions after hours or would like to schedule an appointment,  call the clinic at [phone].    Depression  Depression is one of the most common mental health problems today. It is not just a state of unhappiness or sadness. It is a true disease. The cause seems to be related to a decrease in chemicals that transmit signals in the brain. Having a family history of depression, alcoholism, or suicide increases the risk. Chronic illness, chronic pain, migraine headaches, and high emotional stress also increase the risk.  Depression is something we tend to recognize in others, but may have a hard time seeing in ourselves. It can show in many physical and emotional ways:    Loss of appetite    Overeating    Not being able to sleep    Sleeping too much    Tiredness not related to physical exertion    Restlessness or irritability    Slowness of movement or speech    Feeling depressed or withdrawn    Loss of interest in things you once enjoyed    Trouble concentrating, poor memory, trouble making decisions    Thoughts of harming or killing oneself, or thoughts that life is not worth living    Low self-esteem  The treatment for depression may include both medicine and psychotherapy. Antidepressants can reduce suffering and can improve the ability to function during the depressed period. Therapy can offer emotional support and help you understand emotional factors that may be causing the  depression.  Home care    Ongoing care and support help people manage this disease. Find a healthcare provider and therapist who meet your needs. Seek help when you feel like you may be getting ill.    Be kind to yourself. Make it a point to do things that you enjoy (gardening, walking in nature, going to a movie). Reward yourself for small successes.    Take care of your physical body. Eat a balanced diet (low in saturated fat and high in fruits and vegetables). Exercise at least 3 times a week for 30 minutes. Even mild-moderate exercise (like brisk walking) can make you feel better.    Don't drink alcohol, which can make depression worse.    Take medicine as prescribed.    Tell each of your healthcare providers about all of the prescription and over-the-counter medicines, vitamins, and supplements you take. Certain supplements interact with medicines and can result in dangerous side effects. Ask your pharmacist when you have questions about medicine interactions.    Talk with your family and trusted friends about your feelings and thoughts. Ask them to help you recognize behavior changes early so you can get help and, if needed, medicine can be adjusted.  Follow-up care  Follow up with your healthcare provider, or as advised.  Call 911  Call 911 if you:    Have suicidal thoughts, a suicide plan, and the means to carry out the plan; or serious thoughts of hurting someone else     Have trouble breathing    Are very confused    Feel very drowsy or have trouble awakening    Faint or lose consciousness    Have new chest pain that becomes more severe, lasts longer, or spreads into your shoulder, arm, neck, jaw, or back  When to seek medical advice  Call your healthcare provider right away if any of these happen:    Feeling extreme depression, fear, anxiety, or anger toward yourself or others    Feeling out of control    Feeling that you may try to harm yourself or another    Hearing voices that others do not  hear    Seeing things that others do not see    Can t sleep or eat for 3 days in a row    Friends or family express concern over your behavior and ask you to seek help  Date Last Reviewed: 10/1/2017    6382-6826 [employer]. [address] [facility], [place] [address]. All rights reserved. This information is not intended as a substitute for professional medical care. Always follow your healthcare professional's instructions.        Anxiety Reaction  Anxiety is the feeling we all get when we think something bad might happen. It is a normal response to stress and usually causes only a mild reaction. When anxiety becomes more severe, it can interfere with daily life. In some cases, you may not even be aware of what it is you re anxious about. There may also be a genetic link or it may be a learned behavior in the home.  Both psychological and physical triggers cause stress reaction. It's often a response to fear or emotional stress, real or imagined. This stress may come from home, family, work, or social relationships.  During an anxiety reaction, you may feel:    Helpless    Nervous    Depressed    Irritable  Your body may show signs of anxiety in many ways. You may experience:    Dry mouth    Shakiness    Dizziness    Weakness    Trouble breathing    Breathing fast (hyperventilating)    Chest pressure    Sweating    Headache    Nausea    Diarrhea    Tiredness    Inability to sleep    Sexual problems  Home care    Try to locate the sources of stress in your life. They may not be obvious. These may include:  ? Daily hassles of life (such as traffic jams, missed appointments, or car troubles)  ? Major life changes, both good (new baby or job promotion) and bad (loss of job or loss of loved one)  ? Overload: feeling that you have too many responsibilities and can't take care of all of them at once  ? Feeling helpless or feeling that your problems are beyond what you re able to solve    Notice how your body  reacts to stress. Learn to listen to your body signals. This will help you take action before the stress becomes severe.    When you can, do something about the source of your stress. (Avoid hassles, limit the amount of change that happens in your life at one time and take a break when you feel overloaded).    Unfortunately, many stressful situations can't be avoided. It is necessary to learn how to better manage stress. There are many proven methods that will reduce your anxiety. These include simple things like exercise, good nutrition, and adequate rest. Also, there are certain techniques that are helpful:  ? Relaxation  ? Breathing exercises  ? Visualization  ? Biofeedback  ? Meditation  For more information about this, consult your healthcare provider or go to a local bookstore and review the many books and tapes available on this subject.  Follow-up care  If you feel that your anxiety is not responding to self-help measures, contact your healthcare provider or make an appointment with a counselor. You may need short-term psychological counseling and temporary medicine to help you manage stress.  Call 911  Call 911 if any of these happen:    Trouble breathing    Confusion    Drowsiness or trouble wakening    Fainting or loss of consciousness    Rapid heart rate    Seizure    New chest pain that becomes more severe, lasts longer, or spreads into your shoulder, arm, neck, jaw, or back  When to seek medical advice  Call your healthcare provider right away if any of these happen:    Your symptoms get worse    Severe headache not relieved by rest and mild pain reliever  Date Last Reviewed: 10/1/2017    8558-5640 [employer]. [address] [place] [address]. All rights reserved. This information is not intended as a substitute for professional medical care. Always follow your healthcare professional's instructions.            Paul Pacheco MD  [facility]    Injectable  Influenza Immunization Documentation    1.  Is the person to be vaccinated sick today?   No    2. Does the person to be vaccinated have an allergy to a component   of the vaccine?   No  Egg Allergy Algorithm Link    3. Has the person to be vaccinated ever had a serious reaction   to influenza vaccine in the past?   No    4. Has the person to be vaccinated ever had Guillain-Barré syndrome?   No    Form completed by ANJU Coffey MA

## 2018-10-24 NOTE — MR AVS SNAPSHOT
After Visit Summary   10/24/2018    Iker Edge    MRN: [id number]           Patient Information     Date Of Birth          2000        Visit Information        Provider Department      10/24/2018 9:00 AM Paul Pacheco MD [facility]        Today's Diagnoses     Anxiety and depression    -  1    Need for prophylactic vaccination and inoculation against influenza          Care Instructions    Start sertraline as prescribed.  Follow up in 3 weeks with Dr. Pacheco for recheck.    You will be contacted in 1-2 business days to get a schedule for the behavior therapist    Take your medication as directed.  Full effect/benefit of the medications may not be evident until a few weeks after start.  Treatment of depression/anxiety involves also counseling. Take adequate sleep, and exercise regularly.  If you experience suicidal thoughts, thoughts of hurting others or hallucinations, see a doctor right away.        Thank you for choosing [facility].  You may be receiving a survey in the mail from [facility] regarding your visit today.  Please take a few minutes to complete and return the survey to let us know how we are doing.      If you have questions or concerns, please contact us via Ektron or you can contact your care team at [phone].    Our Clinic hours are:  Monday 6:40 am  to 7:00 pm  Tuesday -Friday 6:40 am to 5:00 pm    The [place] outpatient lab hours are:  Monday - Friday 6:10 am to 4:45 pm  Saturdays 7:00 am to 11:00 am  Appointments are required, call [phone]    If you have clinical questions after hours or would like to schedule an appointment,  call the clinic at [phone].    Depression  Depression is one of the most common mental health problems today. It is not just a state of unhappiness or sadness. It is a true disease. The cause seems to be related to a decrease in chemicals that transmit signals in the brain. Having a family history of  depression, alcoholism, or suicide increases the risk. Chronic illness, chronic pain, migraine headaches, and high emotional stress also increase the risk.  Depression is something we tend to recognize in others, but may have a hard time seeing in ourselves. It can show in many physical and emotional ways:    Loss of appetite    Overeating    Not being able to sleep    Sleeping too much    Tiredness not related to physical exertion    Restlessness or irritability    Slowness of movement or speech    Feeling depressed or withdrawn    Loss of interest in things you once enjoyed    Trouble concentrating, poor memory, trouble making decisions    Thoughts of harming or killing oneself, or thoughts that life is not worth living    Low self-esteem  The treatment for depression may include both medicine and psychotherapy. Antidepressants can reduce suffering and can improve the ability to function during the depressed period. Therapy can offer emotional support and help you understand emotional factors that may be causing the depression.  Home care    Ongoing care and support help people manage this disease. Find a healthcare provider and therapist who meet your needs. Seek help when you feel like you may be getting ill.    Be kind to yourself. Make it a point to do things that you enjoy (gardening, walking in nature, going to a movie). Reward yourself for small successes.    Take care of your physical body. Eat a balanced diet (low in saturated fat and high in fruits and vegetables). Exercise at least 3 times a week for 30 minutes. Even mild-moderate exercise (like brisk walking) can make you feel better.    Don't drink alcohol, which can make depression worse.    Take medicine as prescribed.    Tell each of your healthcare providers about all of the prescription and over-the-counter medicines, vitamins, and supplements you take. Certain supplements interact with medicines and can result in dangerous side effects. Ask your  pharmacist when you have questions about medicine interactions.    Talk with your family and trusted friends about your feelings and thoughts. Ask them to help you recognize behavior changes early so you can get help and, if needed, medicine can be adjusted.  Follow-up care  Follow up with your healthcare provider, or as advised.  Call 911  Call 911 if you:    Have suicidal thoughts, a suicide plan, and the means to carry out the plan; or serious thoughts of hurting someone else     Have trouble breathing    Are very confused    Feel very drowsy or have trouble awakening    Faint or lose consciousness    Have new chest pain that becomes more severe, lasts longer, or spreads into your shoulder, arm, neck, jaw, or back  When to seek medical advice  Call your healthcare provider right away if any of these happen:    Feeling extreme depression, fear, anxiety, or anger toward yourself or others    Feeling out of control    Feeling that you may try to harm yourself or another    Hearing voices that others do not hear    Seeing things that others do not see    Can t sleep or eat for 3 days in a row    Friends or family express concern over your behavior and ask you to seek help  Date Last Reviewed: 10/1/2017    8179-1363 [employer]. [address] [place] [address]. All rights reserved. This information is not intended as a substitute for professional medical care. Always follow your healthcare professional's instructions.        Anxiety Reaction  Anxiety is the feeling we all get when we think something bad might happen. It is a normal response to stress and usually causes only a mild reaction. When anxiety becomes more severe, it can interfere with daily life. In some cases, you may not even be aware of what it is you re anxious about. There may also be a genetic link or it may be a learned behavior in the home.  Both psychological and physical triggers cause stress reaction. It's often a response  to fear or emotional stress, real or imagined. This stress may come from home, family, work, or social relationships.  During an anxiety reaction, you may feel:    Helpless    Nervous    Depressed    Irritable  Your body may show signs of anxiety in many ways. You may experience:    Dry mouth    Shakiness    Dizziness    Weakness    Trouble breathing    Breathing fast (hyperventilating)    Chest pressure    Sweating    Headache    Nausea    Diarrhea    Tiredness    Inability to sleep    Sexual problems  Home care    Try to locate the sources of stress in your life. They may not be obvious. These may include:  ? Daily hassles of life (such as traffic jams, missed appointments, or car troubles)  ? Major life changes, both good (new baby or job promotion) and bad (loss of job or loss of loved one)  ? Overload: feeling that you have too many responsibilities and can't take care of all of them at once  ? Feeling helpless or feeling that your problems are beyond what you re able to solve    Notice how your body reacts to stress. Learn to listen to your body signals. This will help you take action before the stress becomes severe.    When you can, do something about the source of your stress. (Avoid hassles, limit the amount of change that happens in your life at one time and take a break when you feel overloaded).    Unfortunately, many stressful situations can't be avoided. It is necessary to learn how to better manage stress. There are many proven methods that will reduce your anxiety. These include simple things like exercise, good nutrition, and adequate rest. Also, there are certain techniques that are helpful:  ? Relaxation  ? Breathing exercises  ? Visualization  ? Biofeedback  ? Meditation  For more information about this, consult your healthcare provider or go to a local bookstore and review the many books and tapes available on this subject.  Follow-up care  If you feel that your anxiety is not responding to  self-help measures, contact your healthcare provider or make an appointment with a counselor. You may need short-term psychological counseling and temporary medicine to help you manage stress.  Call 911  Call 911 if any of these happen:    Trouble breathing    Confusion    Drowsiness or trouble wakening    Fainting or loss of consciousness    Rapid heart rate    Seizure    New chest pain that becomes more severe, lasts longer, or spreads into your shoulder, arm, neck, jaw, or back  When to seek medical advice  Call your healthcare provider right away if any of these happen:    Your symptoms get worse    Severe headache not relieved by rest and mild pain reliever  Date Last Reviewed: 10/1/2017    7622-3951 [employer]. [address] [place] [address], [place] [address]. All rights reserved. This information is not intended as a substitute for professional medical care. Always follow your healthcare professional's instructions.                Follow-ups after your visit        Additional Services     MENTAL HEALTH REFERRAL  - Adult; Outpatient Treatment; Individual/Couples/Family/Group Therapy/Health Psychology; G: [facility] [phone]; We will contact you to schedule the appointment or please call with any questions       All scheduling is subject to the client's specific insurance plan & benefits, provider/location availability, and provider clinical specialities.  Please arrive 15 minutes early for your first appointment and bring your completed paperwork.    Please be aware that coverage of these services is subject to the terms and limitations of your health insurance plan.  Call member services at your health plan with any benefit or coverage questions.                            Who to contact     If you have questions or need follow up information about today's clinic visit or your schedule please contact [facility] directly at [phone].  Normal or non-critical  "lab and imaging results will be communicated to you by MyChart, letter or phone within 4 business days after the clinic has received the results. If you do not hear from us within 7 days, please contact the clinic through Unbabelt or phone. If you have a critical or abnormal lab result, we will notify you by phone as soon as possible.  Submit refill requests through Qloud or call your pharmacy and they will forward the refill request to us. Please allow 3 business days for your refill to be completed.          Additional Information About Your Visit        [email or web address] Information     Qloud lets you send messages to your doctor, view your test results, renew your prescriptions, schedule appointments and more. To sign up, go to [email or web address] . Click on \"Log in\" on the left side of the screen, which will take you to the Welcome page. Then click on \"Sign up Now\" on the right side of the page.     You will be asked to enter the access code listed below, as well as some personal information. Please follow the directions to create your username and password.     Your access code is: [email or web address]  Expires: 2019  8:37 AM     Your access code will  in 90 days. If you need help or a new code, please call your [facility] clinic or [phone].        Care EveryWhere ID     This is your Care EveryWhere ID. This could be used by other organizations to access your [facility] medical records  [id number]        Your Vitals Were     Pulse Temperature Respirations Height Pulse Oximetry BMI (Body Mass Index)    58 96.9  F (36.1  C) (Tympanic) 16 5' 9.5\" (1.765 m) 99% 19.59 kg/m2       Blood Pressure from Last 3 Encounters:   10/24/18 138/74   07/10/18 116/74   18 117/68    Weight from Last 3 Encounters:   10/24/18 134 lb 9.6 oz (61.1 kg)   18 144 lb 12.8 oz (65.7 kg)   18 142 lb (64.4 kg)              We Performed the Following     FLU VACCINE, SPLIT VIRUS, IM (QUADRIVALENT) [13494]- >3 YRS     " MENTAL HEALTH REFERRAL  - Adult; Outpatient Treatment; Individual/Couples/Family/Group Therapy/Health Psychology; G: [facility] [phone]; We will contact you to schedule the appointment or please call with any questions     Vaccine Administration, Initial [66928]          Today's Medication Changes          These changes are accurate as of 10/24/18  9:53 AM.  If you have any questions, ask your nurse or doctor.               Start taking these medicines.        Dose/Directions    sertraline 50 MG tablet   Commonly known as:  ZOLOFT   Used for:  Anxiety and depression   Started by:  Paul Pacheco MD        Take 1/2 tablet (25 mg) for 1-2 weeks, then increase to 1 tablet orally daily   Quantity:  30 tablet   Refills:  0         Stop taking these medicines if you haven't already. Please contact your care team if you have questions.     order for DME   Stopped by:  Paul Pacheco MD                Where to get your medicines      These medications were sent to [facility] - - [facility] [id number] [place] [id number]    Hours:  [facility] Phone:  [phone]     sertraline 50 MG tablet                Primary Care Provider Office Phone # Fax #    Paul Pacheco [phone] [address] [facility] [address]        Equal Access to Services     SOFYA ANDERSON AH: Hadmarta hookso Sojustin, waaxda luqadaha, qaybta kaalmada pj, stanford espinoza. So [facility] [phone].    ATENCIÓN: Si habla español, tiene a estevez disposición servicios gratuitos de asistencia lingüística. Huy al [phone].    We comply with applicable federal civil rights laws and [place] laws. We do not discriminate on the basis of race, color, national origin, age, disability, sex, sexual orientation, or gender identity.            Thank you!      Thank you for choosing [facility]  for your care. Our goal is always to provide you with excellent care. Hearing back from our patients is one way we can continue to improve our services. Please take a few minutes to complete the written survey that you may receive in the mail after your visit with us. Thank you!             Your Updated Medication List - Protect others around you: Learn how to safely use, store and throw away your medicines at www.disposemymeds.org.          This list is accurate as of 10/24/18  9:53 AM.  Always use your most recent med list.                   Brand Name Dispense Instructions for use Diagnosis    fluticasone 50 MCG/ACT spray    FLONASE    16 g    Spray 2 sprays into both nostrils daily    Chronic seasonal allergic rhinitis due to pollen       ibuprofen 200 MG capsule     120 capsule    Take 400 mg by mouth every 4 hours as needed for pain or fever        olopatadine HCl 0.2 % Soln    PATADAY    1 Bottle    Place 1 drop into both eyes daily    Allergic conjunctivitis, bilateral       sertraline 50 MG tablet    ZOLOFT    30 tablet    Take 1/2 tablet (25 mg) for 1-2 weeks, then increase to 1 tablet orally daily    Anxiety and depression

## 2018-10-24 NOTE — PATIENT INSTRUCTIONS
Start sertraline as prescribed.  Follow up in 3 weeks with Dr. Pacheco for recheck.    You will be contacted in 1-2 business days to get a schedule for the behavior therapist    Take your medication as directed.  Full effect/benefit of the medications may not be evident until a few weeks after start.  Treatment of depression/anxiety involves also counseling. Take adequate sleep, and exercise regularly.  If you experience suicidal thoughts, thoughts of hurting others or hallucinations, see a doctor right away.        Thank you for choosing [facility].  You may be receiving a survey in the mail from [facility] regarding your visit today.  Please take a few minutes to complete and return the survey to let us know how we are doing.      If you have questions or concerns, please contact us via Epocrates or you can contact your care team at [phone].    Our Clinic hours are:  Monday 6:40 am  to 7:00 pm  Tuesday -Friday 6:40 am to 5:00 pm    The [place] outpatient lab hours are:  Monday - Friday 6:10 am to 4:45 pm  Saturdays 7:00 am to 11:00 am  Appointments are required, call [phone]    If you have clinical questions after hours or would like to schedule an appointment,  call the clinic at [phone].    Depression  Depression is one of the most common mental health problems today. It is not just a state of unhappiness or sadness. It is a true disease. The cause seems to be related to a decrease in chemicals that transmit signals in the brain. Having a family history of depression, alcoholism, or suicide increases the risk. Chronic illness, chronic pain, migraine headaches, and high emotional stress also increase the risk.  Depression is something we tend to recognize in others, but may have a hard time seeing in ourselves. It can show in many physical and emotional ways:    Loss of appetite    Overeating    Not being able to sleep    Sleeping too much    Tiredness not related to physical  exertion    Restlessness or irritability    Slowness of movement or speech    Feeling depressed or withdrawn    Loss of interest in things you once enjoyed    Trouble concentrating, poor memory, trouble making decisions    Thoughts of harming or killing oneself, or thoughts that life is not worth living    Low self-esteem  The treatment for depression may include both medicine and psychotherapy. Antidepressants can reduce suffering and can improve the ability to function during the depressed period. Therapy can offer emotional support and help you understand emotional factors that may be causing the depression.  Home care    Ongoing care and support help people manage this disease. Find a healthcare provider and therapist who meet your needs. Seek help when you feel like you may be getting ill.    Be kind to yourself. Make it a point to do things that you enjoy (gardening, walking in nature, going to a movie). Reward yourself for small successes.    Take care of your physical body. Eat a balanced diet (low in saturated fat and high in fruits and vegetables). Exercise at least 3 times a week for 30 minutes. Even mild-moderate exercise (like brisk walking) can make you feel better.    Don't drink alcohol, which can make depression worse.    Take medicine as prescribed.    Tell each of your healthcare providers about all of the prescription and over-the-counter medicines, vitamins, and supplements you take. Certain supplements interact with medicines and can result in dangerous side effects. Ask your pharmacist when you have questions about medicine interactions.    Talk with your family and trusted friends about your feelings and thoughts. Ask them to help you recognize behavior changes early so you can get help and, if needed, medicine can be adjusted.  Follow-up care  Follow up with your healthcare provider, or as advised.  Call 911  Call 911 if you:    Have suicidal thoughts, a suicide plan, and the means to carry  out the plan; or serious thoughts of hurting someone else     Have trouble breathing    Are very confused    Feel very drowsy or have trouble awakening    Faint or lose consciousness    Have new chest pain that becomes more severe, lasts longer, or spreads into your shoulder, arm, neck, jaw, or back  When to seek medical advice  Call your healthcare provider right away if any of these happen:    Feeling extreme depression, fear, anxiety, or anger toward yourself or others    Feeling out of control    Feeling that you may try to harm yourself or another    Hearing voices that others do not hear    Seeing things that others do not see    Can t sleep or eat for 3 days in a row    Friends or family express concern over your behavior and ask you to seek help  Date Last Reviewed: 10/1/2017    6671-8902 [employer]. [address] [place] [address]. All rights reserved. This information is not intended as a substitute for professional medical care. Always follow your healthcare professional's instructions.        Anxiety Reaction  Anxiety is the feeling we all get when we think something bad might happen. It is a normal response to stress and usually causes only a mild reaction. When anxiety becomes more severe, it can interfere with daily life. In some cases, you may not even be aware of what it is you re anxious about. There may also be a genetic link or it may be a learned behavior in the home.  Both psychological and physical triggers cause stress reaction. It's often a response to fear or emotional stress, real or imagined. This stress may come from home, family, work, or social relationships.  During an anxiety reaction, you may feel:    Helpless    Nervous    Depressed    Irritable  Your body may show signs of anxiety in many ways. You may experience:    Dry mouth    Shakiness    Dizziness    Weakness    Trouble breathing    Breathing fast (hyperventilating)    Chest  pressure    Sweating    Headache    Nausea    Diarrhea    Tiredness    Inability to sleep    Sexual problems  Home care    Try to locate the sources of stress in your life. They may not be obvious. These may include:  ? Daily hassles of life (such as traffic jams, missed appointments, or car troubles)  ? Major life changes, both good (new baby or job promotion) and bad (loss of job or loss of loved one)  ? Overload: feeling that you have too many responsibilities and can't take care of all of them at once  ? Feeling helpless or feeling that your problems are beyond what you re able to solve    Notice how your body reacts to stress. Learn to listen to your body signals. This will help you take action before the stress becomes severe.    When you can, do something about the source of your stress. (Avoid hassles, limit the amount of change that happens in your life at one time and take a break when you feel overloaded).    Unfortunately, many stressful situations can't be avoided. It is necessary to learn how to better manage stress. There are many proven methods that will reduce your anxiety. These include simple things like exercise, good nutrition, and adequate rest. Also, there are certain techniques that are helpful:  ? Relaxation  ? Breathing exercises  ? Visualization  ? Biofeedback  ? Meditation  For more information about this, consult your healthcare provider or go to a local bookstore and review the many books and tapes available on this subject.  Follow-up care  If you feel that your anxiety is not responding to self-help measures, contact your healthcare provider or make an appointment with a counselor. You may need short-term psychological counseling and temporary medicine to help you manage stress.  Call 911  Call 911 if any of these happen:    Trouble breathing    Confusion    Drowsiness or trouble wakening    Fainting or loss of consciousness    Rapid heart rate    Seizure    New chest pain that  becomes more severe, lasts longer, or spreads into your shoulder, arm, neck, jaw, or back  When to seek medical advice  Call your healthcare provider right away if any of these happen:    Your symptoms get worse    Severe headache not relieved by rest and mild pain reliever  Date Last Reviewed: 10/1/2017    6670-6932 [employer]. [address] [place] [address]. All rights reserved. This information is not intended as a substitute for professional medical care. Always follow your healthcare professional's instructions.

## 2018-10-25 ASSESSMENT — ANXIETY QUESTIONNAIRES: GAD7 TOTAL SCORE: 14

## 2018-10-25 ASSESSMENT — PATIENT HEALTH QUESTIONNAIRE - PHQ9: SUM OF ALL RESPONSES TO PHQ QUESTIONS 1-9: 18

## 2018-10-29 PROBLEM — F32.A ANXIETY AND DEPRESSION: Status: ACTIVE | Noted: 2018-10-29

## 2018-10-29 PROBLEM — F41.9 ANXIETY AND DEPRESSION: Status: ACTIVE | Noted: 2018-10-29

## 2018-11-14 ENCOUNTER — MEDICAL CORRESPONDENCE (OUTPATIENT)
Dept: HEALTH INFORMATION MANAGEMENT | Facility: CLINIC | Age: 18
End: 2018-11-14

## 2018-11-14 ENCOUNTER — OFFICE VISIT (OUTPATIENT)
Dept: FAMILY MEDICINE | Facility: CLINIC | Age: 18
End: 2018-11-14
Payer: COMMERCIAL

## 2018-11-14 VITALS
OXYGEN SATURATION: 99 % | HEIGHT: 70 IN | SYSTOLIC BLOOD PRESSURE: 134 MMHG | WEIGHT: 134.4 LBS | TEMPERATURE: 96.1 F | BODY MASS INDEX: 19.24 KG/M2 | RESPIRATION RATE: 12 BRPM | HEART RATE: 51 BPM | DIASTOLIC BLOOD PRESSURE: 82 MMHG

## 2018-11-14 DIAGNOSIS — Q87.2 KLIPPEL TRENAUNAY SYNDROME: ICD-10-CM

## 2018-11-14 DIAGNOSIS — F32.A ANXIETY AND DEPRESSION: Primary | ICD-10-CM

## 2018-11-14 DIAGNOSIS — F41.9 ANXIETY AND DEPRESSION: Primary | ICD-10-CM

## 2018-11-14 PROCEDURE — 99213 OFFICE O/P EST LOW 20 MIN: CPT | Performed by: FAMILY MEDICINE

## 2018-11-14 ASSESSMENT — ANXIETY QUESTIONNAIRES
3. WORRYING TOO MUCH ABOUT DIFFERENT THINGS: MORE THAN HALF THE DAYS
7. FEELING AFRAID AS IF SOMETHING AWFUL MIGHT HAPPEN: NEARLY EVERY DAY
5. BEING SO RESTLESS THAT IT IS HARD TO SIT STILL: NOT AT ALL
1. FEELING NERVOUS, ANXIOUS, OR ON EDGE: SEVERAL DAYS
GAD7 TOTAL SCORE: 10
6. BECOMING EASILY ANNOYED OR IRRITABLE: SEVERAL DAYS
IF YOU CHECKED OFF ANY PROBLEMS ON THIS QUESTIONNAIRE, HOW DIFFICULT HAVE THESE PROBLEMS MADE IT FOR YOU TO DO YOUR WORK, TAKE CARE OF THINGS AT HOME, OR GET ALONG WITH OTHER PEOPLE: SOMEWHAT DIFFICULT
2. NOT BEING ABLE TO STOP OR CONTROL WORRYING: SEVERAL DAYS

## 2018-11-14 ASSESSMENT — PATIENT HEALTH QUESTIONNAIRE - PHQ9
5. POOR APPETITE OR OVEREATING: MORE THAN HALF THE DAYS
SUM OF ALL RESPONSES TO PHQ QUESTIONS 1-9: 11

## 2018-11-14 NOTE — PROGRESS NOTES
SUBJECTIVE:   Iker Edge is a 18 year old male who presents to clinic today for the following health issues:      Depression and Anxiety Follow-Up    Status since last visit: Improved     Other associated symptoms: able to get to sleep easier than before but does not have the desire to sleep; denies overwhelming depression, sadness, hopelessness or anxiety.     Complicating factors:     Significant life event: No     Current substance abuse: None    PHQ 10/24/2018 11/14/2018   PHQ-9 Total Score 18 11   Q9: Suicide Ideation Several days Several days     LISA-7 SCORE 10/24/2018 11/14/2018   Total Score 14 10     In the past two weeks have you had thoughts of suicide or self-harm?  No.    Do you have concerns about your personal safety or the safety of others?   No  PHQ-9  English  PHQ-9   Any Language  LISA-7  Suicide Assessment Five-step Evaluation and Treatment (SAFE-T)    Amount of exercise or physical activity: moderate    Problems taking medications regularly: No    Medication side effects: none    Diet: regular (no restrictions)    Patient requests new compression stockings Rx. Has severe leg varicosities due to Klippel Trenauey  Syndrome- seen by vascular clinic.    Problem list and histories reviewed & adjusted, as indicated.  Additional history: as documented    Patient Active Problem List   Diagnosis     Epidermal inclusion cyst     Allergic conjunctivitis, bilateral     Seasonal allergic rhinitis     Klippel Trenaunay syndrome     Venous (peripheral) insufficiency     Varicose veins of left leg with edema     Anxiety and depression     Past Surgical History:   Procedure Laterality Date     SURGICAL HISTORY OF -       bilateral myringotomy tubes       Social History   Substance Use Topics     Smoking status: Never Smoker     Smokeless tobacco: Never Used      Comment: no tobacco exposure     Alcohol use No     Family History   Problem Relation Age of Onset     Thyroid Disease Mother      has 1/2 thyroid      GASTROINTESTINAL DISEASE Mother      ulcerative colitis     Eye Disorder Paternal Grandmother      cataracts     Anemia Paternal Grandmother          Current Outpatient Prescriptions   Medication Sig Dispense Refill     ibuprofen 200 MG capsule Take 400 mg by mouth every 4 hours as needed for pain or fever 120 capsule      order for DME Equipment being ordered: Compression stockings - upper thigh-high - 20-30 (maximum compression) - one pair 1 each 1     sertraline (ZOLOFT) 50 MG tablet Take 1 tablet (50 mg) by mouth daily Take 1/2 tablet (25 mg) for 1-2 weeks, then increase to 1 tablet orally daily 60 tablet 0     fluticasone (FLONASE) 50 MCG/ACT spray Spray 2 sprays into both nostrils daily (Patient not taking: Reported on 10/24/2018) 16 g 6     olopatadine HCl (PATADAY) 0.2 % SOLN Place 1 drop into both eyes daily (Patient not taking: Reported on 10/24/2018) 1 Bottle 6     [DISCONTINUED] sertraline (ZOLOFT) 50 MG tablet Take 1/2 tablet (25 mg) for 1-2 weeks, then increase to 1 tablet orally daily 30 tablet 0     No Known Allergies    Reviewed and updated as needed this visit by clinical staff  Tobacco  Allergies  Meds  Med Hx  Surg Hx  Fam Hx  Soc Hx      Reviewed and updated as needed this visit by Provider         ROS:  C: NEGATIVE for fever, chills, change in weight  I: NEGATIVE for worrisome rashes, moles or lesions  E: NEGATIVE for vision changes or irritation  E/M: NEGATIVE for ear, mouth and throat problems  R: NEGATIVE for significant cough or SOB  CV: NEGATIVE for chest pain, palpitations or peripheral edema  GI: NEGATIVE for nausea, abdominal pain, heartburn, or change in bowel habits  : NEGATIVE for frequency, dysuria, or hematuria  N: NEGATIVE for weakness, dizziness or paresthesias  E: NEGATIVE for temperature intolerance, skin/hair changes  PSYCHIATRIC: see above    OBJECTIVE:                                                    /82 (BP Location: Right arm, Patient Position: Chair,  "Cuff Size: Adult Regular)  Pulse 51  Temp 96.1  F (35.6  C) (Tympanic)  Resp 12  Ht 5' 9.5\" (1.765 m)  Wt 134 lb 6.4 oz (61 kg)  SpO2 99%  BMI 19.56 kg/m2  Body mass index is 19.56 kg/(m^2).  GENERAL:alert and no distress  EYES: no icterus, PERRLA  SKIN: no jaundice/rash  NEURO: no tremors  PSYCH: well-kempt, linear thought process, normal speech, good insight/judgement, normal mood, appropriate affect, no suicidality, no aggression, no hallucination    Diagnostic test results:  Diagnostic Test Results:  none      ASSESSMENT/PLAN:                                                        ICD-10-CM    1. Anxiety and depression F41.9 sertraline (ZOLOFT) 50 MG tablet    F32.9 Improved. Due to patient's concern that med may be affecting his \"desire to sleep\", will keep current med dose at 50 mg daily.  Reinforced non-medical means to cope with mood changes, stress and anxiety.  Will reinforce sleep hygiene if with worsening sleep patterns.  Return precautions discussed and given to patient.     2. Klippel Trenaunay syndrome Q87.2 order for DME - for compression stockings.  Further follow up with vascular clinic       Follow up with Provider - 2 months   Patient Instructions   Your moods are improving.  After today's discussion, continue medication at current dose.  If symptoms change/worsen, see doctor again.  Follow up in clinic first or second week of January 2019.        Thank you for choosing [facility].  You may be receiving a survey in the mail from [email or web address] regarding your visit today.  Please take a few minutes to complete and return the survey to let us know how we are doing.      If you have questions or concerns, please contact us via [employer] or you can contact your care team at [phone].    Our Clinic hours are:  Monday 6:40 am  to 7:00 pm  Tuesday -Friday 6:40 am to 5:00 pm    The [place] outpatient lab hours are:  Monday - Friday 6:10 am to 4:45 pm  Saturdays 7:00 am to 11:00 " am  Appointments are required, call [phone]    If you have clinical questions after hours or would like to schedule an appointment,  call the clinic at [phone].        Paul Pacheco MD  [facility]

## 2018-11-14 NOTE — MR AVS SNAPSHOT
After Visit Summary   11/14/2018    Iker Edge    MRN: [id number]           Patient Information     Date Of Birth          2000        Visit Information        Provider Department      11/14/2018 8:20 AM Paul Pacheco MD [facility]        Today's Diagnoses     Klippel Trenaunay syndrome    -  1    Anxiety and depression          Care Instructions    Your moods are improving.  After today's discussion, continue medication at current dose.  If symptoms change/worsen, see doctor again.  Follow up in clinic first or second week of January 2019.        Thank you for choosing [facility] ([facility]  You may be receiving a survey in the mail from [employer] regarding your visit today.  Please take a few minutes to complete and return the survey to let us know how we are doing.      If you have questions or concerns, please contact us via [employer] or you can contact your care team at [phone].    Our Clinic hours are:  Monday 6:40 am  to 7:00 pm  Tuesday -Friday 6:40 am to 5:00 pm    The [place] outpatient lab hours are:  Monday - Friday 6:10 am to 4:45 pm  Saturdays 7:00 am to 11:00 am  Appointments are required, call [phone]    If you have clinical questions after hours or would like to schedule an appointment,  call the clinic at [phone].            Follow-ups after your visit        Your next 10 appointments already scheduled     Nov 14, 2018  8:20 AM CST   SHORT with Paul Pacheco MD   [facility] ([facility]    [address] [address]   [phone]            Dec 11, 2018 11:30 AM CST   (Arrive by 11:00 AM)   New Visit with Leticia Goodman [facility] ([facility])    [address] [place] [address]   [phone]            Dec 18, 2018  8:30 AM CST   Return Visit with Leticia Goodman GUERITA   [facility]  "([facility])    [address] [address] [phone]              Who to contact     If you have questions or need follow up information about today's clinic visit or your schedule please contact [facility] directly at [phone].  Normal or non-critical lab and imaging results will be communicated to you by MyChart, letter or phone within 4 business days after the clinic has received the results. If you do not hear from us within 7 days, please contact the clinic through MyChart or phone. If you have a critical or abnormal lab result, we will notify you by phone as soon as possible.  Submit refill requests through [employer] or call your pharmacy and they will forward the refill request to us. Please allow 3 business days for your refill to be completed.          Additional Information About Your Visit        MyChart Information     [employer] lets you send messages to your doctor, view your test results, renew your prescriptions, schedule appointments and more. To sign up, go to [email or web address] . Click on \"Log in\" on the left side of the screen, which will take you to the Welcome page. Then click on \"Sign up Now\" on the right side of the page.     You will be asked to enter the access code listed below, as well as some personal information. Please follow the directions to create your username and password.     Your access code is: [email or web address]  Expires: 2019  7:37 AM     Your access code will  in 90 days. If you need help or a new code, please call your [facility]3] or [phone].        Care EveryWhere ID     This is your Care EveryWhere ID. This could be used by other organizations to access your [facility] medical records  [id number]        Your Vitals Were     Pulse Temperature Respirations Height Pulse Oximetry BMI (Body Mass Index)    51 96.1  F (35.6  C) (Tympanic) 12 5' 9.5\" (1.765 m) 99% 19.56 kg/m2       Blood Pressure from Last 3 " Encounters:   11/14/18 134/82   10/24/18 138/74   07/10/18 116/74    Weight from Last 3 Encounters:   11/14/18 134 lb 6.4 oz (61 kg)   10/24/18 134 lb 9.6 oz (61.1 kg)   06/14/18 144 lb 12.8 oz (65.7 kg)              Today, you had the following     No orders found for display         Today's Medication Changes          These changes are accurate as of 11/14/18  8:16 AM.  If you have any questions, ask your nurse or doctor.               Start taking these medicines.        Dose/Directions    order for DME   Used for:  Klippel Trenaunay syndrome   Started by:  Paul Pacheco MD        Equipment being ordered: Compression stockings - upper thigh-high - 20-30 (maximum compression) - one pair   Quantity:  1 each   Refills:  1         These medicines have changed or have updated prescriptions.        Dose/Directions    sertraline 50 MG tablet   Commonly known as:  ZOLOFT   This may have changed:    - how much to take  - how to take this  - when to take this   Used for:  Anxiety and depression   Changed by:  Paul Pacheco MD        Dose:  50 mg   Take 1 tablet (50 mg) by mouth daily Take 1/2 tablet (25 mg) for 1-2 weeks, then increase to 1 tablet orally daily   Quantity:  60 tablet   Refills:  0            Where to get your medicines      These medications were sent to [facility] - - [place] - [address] [address]    Hours:  [facility] Phone:  [phone]     sertraline 50 MG tablet         Some of these will need a paper prescription and others can be bought over the counter.  Ask your nurse if you have questions.     Bring a paper prescription for each of these medications     order for DME                Primary Care Provider Office Phone # Fax #    Paul Pacheco [phone] [address] [facility] [address]        Equal Access to Services     SOFYA ANDERSON  AH: Hadii edison nairjoseo Sojoseloali, waaxda luqadaha, qaybta kaalmada adeyvonne, stanford lenorain hayaapearl fernandovic abbott radha espinoza. So [facility] [phone].    ATENCIÓN: Si mindala kaitlin, tiene a estevez disposición servicios gratuitos de asistencia lingüística. Llame al [phone].    We comply with applicable federal civil rights laws and [place] laws. We do not discriminate on the basis of race, color, national origin, age, disability, sex, sexual orientation, or gender identity.            Thank you!     Thank you for choosing [facility]  for your care. Our goal is always to provide you with excellent care. Hearing back from our patients is one way we can continue to improve our services. Please take a few minutes to complete the written survey that you may receive in the mail after your visit with us. Thank you!             Your Updated Medication List - Protect others around you: Learn how to safely use, store and throw away your medicines at www.disposemymeds.org.          This list is accurate as of 11/14/18  8:16 AM.  Always use your most recent med list.                   Brand Name Dispense Instructions for use Diagnosis    fluticasone 50 MCG/ACT spray    FLONASE    16 g    Spray 2 sprays into both nostrils daily    Chronic seasonal allergic rhinitis due to pollen       ibuprofen 200 MG capsule     120 capsule    Take 400 mg by mouth every 4 hours as needed for pain or fever        olopatadine HCl 0.2 % Soln    PATADAY    1 Bottle    Place 1 drop into both eyes daily    Allergic conjunctivitis, bilateral       order for DME     1 each    Equipment being ordered: Compression stockings - upper thigh-high - 20-30 (maximum compression) - one pair    Klippel Trenaunay syndrome       sertraline 50 MG tablet    ZOLOFT    60 tablet    Take 1 tablet (50 mg) by mouth daily Take 1/2 tablet (25 mg) for 1-2 weeks, then increase to 1 tablet orally daily    Anxiety and depression

## 2018-11-14 NOTE — PATIENT INSTRUCTIONS
Your moods are improving.  After today's discussion, continue medication at current dose.  If symptoms change/worsen, see doctor again.  Follow up in clinic first or second week of January 2019.        Thank you for choosing [facility].  You may be receiving a survey in the mail from Sasha Pedroza regarding your visit today.  Please take a few minutes to complete and return the survey to let us know how we are doing.      If you have questions or concerns, please contact us via [employer] or you can contact your care team at [phone].    Our Clinic hours are:  Monday 6:40 am  to 7:00 pm  Tuesday -Friday 6:40 am to 5:00 pm    The [place] outpatient lab hours are:  Monday - Friday 6:10 am to 4:45 pm  Saturdays 7:00 am to 11:00 am  Appointments are required, call [phone]    If you have clinical questions after hours or would like to schedule an appointment,  call the clinic at [phone].

## 2018-11-15 ASSESSMENT — ANXIETY QUESTIONNAIRES: GAD7 TOTAL SCORE: 10

## 2018-11-16 ENCOUNTER — TELEPHONE (OUTPATIENT)
Dept: FAMILY MEDICINE | Facility: CLINIC | Age: 18
End: 2018-11-16

## 2019-03-07 ENCOUNTER — TELEPHONE (OUTPATIENT)
Dept: FAMILY MEDICINE | Facility: CLINIC | Age: 19
End: 2019-03-07

## 2019-03-07 NOTE — LETTER
March 19, 2019      Iker Edge  [address]  [place] [address]        Dear Iker,     In order to ensure we are providing the best quality care, we have reviewed your chart and see that you are due for:  An update on the depression and anxiety questionnaires.  These tools help your provider to monitor and manage your symptoms and treatment plan.  Please complete the enclosed form and return in the provided envelope.    Please call the clinic with any questions or concerns.    Thank you for trusting us with your health care.  Sincerely,    Your [facility] Team/lw

## 2019-03-07 NOTE — TELEPHONE ENCOUNTER
PHQ9 Due between: 2/24/19-6/24/19    Please contact patient to complete follow up PHQ9 before their DUE DATE.     Index date 10/24/18, phq9 score 18.    This is important feedback for your care team to assess your symptoms and treatment plan.    You completed this same questionnaire   PHQ-9 SCORE 11/14/2018   PHQ-9 Total Score 11       MA STAFF: If upon calling patient and PHQ9 score is higher that 5 route to the provider. You may also seek an RN for review.

## 2019-03-07 NOTE — LETTER
April 2, 2019      Iker Edge  [address]  [place] [address]        Dear Iker,     This is a reminder that you are due for an office visit to follow up regarding depression symptoms.    Please call [phone] to arrange your appointment.    Thank you.    Sincerely,        Paul Pacheco MD/ Alma Caba RN        lar

## 2019-03-11 NOTE — TELEPHONE ENCOUNTER
Panel Management Review      Patient has the following on his problem list:     Depression / Dysthymia review    Measure:  Needs PHQ-9 score of 4 or less during index window.  Administer PHQ-9 and if score is 5 or more, send encounter to provider for next steps.        PHQ-9 SCORE 10/24/2018 11/14/2018   PHQ-9 Total Score 18 11       If PHQ-9 recheck is 5 or more, route to provider for next steps.    Patient is due for:  PHQ9      Composite cancer screening  Chart review shows that this patient is due/due soon for the following None  Summary:    Patient is due/failing the following:   PHQ9    Type of outreach:    Phone, left message for patient to call back.     Lázaro LUGO CMA

## 2019-03-18 NOTE — TELEPHONE ENCOUNTER
(1st attempt) Left a voice msg for pt to call back.  When he does transfer to the care team to perform PHQ9.  Harini Dai CMA (TOM)   (aka: Nydia Dai)

## 2019-04-02 ASSESSMENT — PATIENT HEALTH QUESTIONNAIRE - PHQ9
SUM OF ALL RESPONSES TO PHQ QUESTIONS 1-9: 17
5. POOR APPETITE OR OVEREATING: SEVERAL DAYS

## 2019-04-02 ASSESSMENT — ANXIETY QUESTIONNAIRES
7. FEELING AFRAID AS IF SOMETHING AWFUL MIGHT HAPPEN: NEARLY EVERY DAY
IF YOU CHECKED OFF ANY PROBLEMS ON THIS QUESTIONNAIRE, HOW DIFFICULT HAVE THESE PROBLEMS MADE IT FOR YOU TO DO YOUR WORK, TAKE CARE OF THINGS AT HOME, OR GET ALONG WITH OTHER PEOPLE: VERY DIFFICULT
3. WORRYING TOO MUCH ABOUT DIFFERENT THINGS: MORE THAN HALF THE DAYS
5. BEING SO RESTLESS THAT IT IS HARD TO SIT STILL: NOT AT ALL
1. FEELING NERVOUS, ANXIOUS, OR ON EDGE: MORE THAN HALF THE DAYS
2. NOT BEING ABLE TO STOP OR CONTROL WORRYING: MORE THAN HALF THE DAYS
6. BECOMING EASILY ANNOYED OR IRRITABLE: MORE THAN HALF THE DAYS
GAD7 TOTAL SCORE: 12

## 2019-04-02 NOTE — TELEPHONE ENCOUNTER
Panel Management Review      Patient has the following on his problem list:     Depression / Dysthymia review    Measure:  Needs PHQ-9 score of 4 or less during index window.  Administer PHQ-9 and if score is 5 or more, send encounter to provider for next steps.    5 - 7 month window range:     PHQ-9 SCORE 10/24/2018 11/14/2018   PHQ-9 Total Score 18 11       If PHQ-9 recheck is 5 or more, route to provider for next steps.    Patient is due for:  PHQ9      Composite cancer screening  Chart review shows that this patient is due/due soon for the following None  Summary:    Patient is due/failing the following:   PHQ9    Action needed:   Patient needs to do PHQ9.    Type of outreach:    None, routed to provider for review.    Questions for provider review:  Dr. Pacheco - Patient completed Phq9/Lisa and mailed back to us.  PHQ9 score:  17; LISA score:  12.  What are your recommendations?                                                                                                                                      Marguerite Knight CMA (AAMA) 10:43 AM 4/2/2019         Chart routed to Provider .

## 2019-04-02 NOTE — TELEPHONE ENCOUNTER
Patient has worse scores compared to November 2019.  Please have patient schedule clinic visit for depression follow up.

## 2019-04-02 NOTE — TELEPHONE ENCOUNTER
Attempted to reach pt but no answer on home phone and unable to leave a message.      Mobile phone answers as pt's mother, Tatum.  No consent to communicate.    Reminder letter sent.    Alma Caba RN

## 2019-04-03 ASSESSMENT — ANXIETY QUESTIONNAIRES: GAD7 TOTAL SCORE: 12

## 2019-08-30 ENCOUNTER — OFFICE VISIT (OUTPATIENT)
Dept: FAMILY MEDICINE | Facility: CLINIC | Age: 19
End: 2019-08-30
Payer: COMMERCIAL

## 2019-08-30 VITALS
WEIGHT: 133 LBS | OXYGEN SATURATION: 99 % | HEIGHT: 70 IN | RESPIRATION RATE: 14 BRPM | TEMPERATURE: 98.5 F | BODY MASS INDEX: 19.04 KG/M2 | SYSTOLIC BLOOD PRESSURE: 122 MMHG | DIASTOLIC BLOOD PRESSURE: 68 MMHG | HEART RATE: 62 BPM

## 2019-08-30 DIAGNOSIS — F33.1 MODERATE RECURRENT MAJOR DEPRESSION (H): Primary | ICD-10-CM

## 2019-08-30 PROCEDURE — 99214 OFFICE O/P EST MOD 30 MIN: CPT | Performed by: FAMILY MEDICINE

## 2019-08-30 ASSESSMENT — PATIENT HEALTH QUESTIONNAIRE - PHQ9
5. POOR APPETITE OR OVEREATING: MORE THAN HALF THE DAYS
SUM OF ALL RESPONSES TO PHQ QUESTIONS 1-9: 17

## 2019-08-30 ASSESSMENT — ANXIETY QUESTIONNAIRES
2. NOT BEING ABLE TO STOP OR CONTROL WORRYING: MORE THAN HALF THE DAYS
5. BEING SO RESTLESS THAT IT IS HARD TO SIT STILL: MORE THAN HALF THE DAYS
7. FEELING AFRAID AS IF SOMETHING AWFUL MIGHT HAPPEN: NEARLY EVERY DAY
GAD7 TOTAL SCORE: 15
IF YOU CHECKED OFF ANY PROBLEMS ON THIS QUESTIONNAIRE, HOW DIFFICULT HAVE THESE PROBLEMS MADE IT FOR YOU TO DO YOUR WORK, TAKE CARE OF THINGS AT HOME, OR GET ALONG WITH OTHER PEOPLE: SOMEWHAT DIFFICULT
6. BECOMING EASILY ANNOYED OR IRRITABLE: SEVERAL DAYS
1. FEELING NERVOUS, ANXIOUS, OR ON EDGE: NEARLY EVERY DAY
3. WORRYING TOO MUCH ABOUT DIFFERENT THINGS: MORE THAN HALF THE DAYS

## 2019-08-30 ASSESSMENT — PAIN SCALES - GENERAL: PAINLEVEL: NO PAIN (0)

## 2019-08-30 ASSESSMENT — MIFFLIN-ST. JEOR: SCORE: 1606.59

## 2019-08-30 NOTE — PATIENT INSTRUCTIONS
Restart sertraline 50 mg daily.  You will be contacted in 1-2 business days to get a schedule for the behavior therapist.    Find some time to exercise.  Eat healthy meals.  Avoid too much caffeine.    Patient Education     Depression  Depression is one of the most common mental health problems today. It is not just a state of unhappiness or sadness. It is a true disease. The cause seems to be related to a decrease in chemicals that transmit signals in the brain. Having a family history of depression, alcoholism, or suicide increases the risk. Chronic illness, chronic pain, migraine headaches, and high emotional stress also increase the risk.  Depression is something we tend to recognize in others, but may have a hard time seeing in ourselves. It can show in many physical and emotional ways:    Loss of appetite    Overeating    Not being able to sleep    Sleeping too much    Tiredness not related to physical exertion    Restlessness or irritability    Slowness of movement or speech    Feeling depressed or withdrawn    Loss of interest in things you once enjoyed    Trouble concentrating, poor memory, trouble making decisions    Thoughts of harming or killing oneself, or thoughts that life is not worth living    Low self-esteem  The treatment for depression may include both medicine and psychotherapy. Antidepressants can reduce suffering and can improve the ability to function during the depressed period. Therapy can offer emotional support and help you understand emotional factors that may be causing the depression.  Home care    Ongoing care and support help people manage this disease. Find a healthcare provider and therapist who meet your needs. Seek help when you feel like you may be getting ill.    Be kind to yourself. Make it a point to do things that you enjoy (gardening, walking in nature, going to a movie). Reward yourself for small successes.    Take care of your physical body. Eat a balanced diet (low in  saturated fat and high in fruits and vegetables). Exercise at least 3 times a week for 30 minutes. Even mild-moderate exercise (like brisk walking) can make you feel better.    Don't drink alcohol, which can make depression worse.    Take medicine as prescribed.    Tell each of your healthcare providers about all of the prescription and over-the-counter medicines, vitamins, and supplements you take. Certain supplements interact with medicines and can result in dangerous side effects. Ask your pharmacist when you have questions about medicine interactions.    Talk with your family and trusted friends about your feelings and thoughts. Ask them to help you recognize behavior changes early so you can get help and, if needed, medicine can be adjusted.  Follow-up care  Follow up with your healthcare provider, or as advised.  Call 911  Call 911 if you:    Have suicidal thoughts, a suicide plan, and the means to carry out the plan; or serious thoughts of hurting someone else     Have trouble breathing    Are very confused    Feel very drowsy or have trouble awakening    Faint or lose consciousness    Have new chest pain that becomes more severe, lasts longer, or spreads into your shoulder, arm, neck, jaw, or back  When to seek medical advice  Call your healthcare provider right away if any of these happen:    Feeling extreme depression, fear, anxiety, or anger toward yourself or others    Feeling out of control    Feeling that you may try to harm yourself or another    Hearing voices that others do not hear    Seeing things that others do not see    Can t sleep or eat for 3 days in a row    Friends or family express concern over your behavior and ask you to seek help  Date Last Reviewed: 10/1/2017    6973-0175 [employer]. [address] [place] [address], [place] [address]. All rights reserved. This information is not intended as a substitute for professional medical care. Always follow your healthcare professional's  instructions.

## 2019-08-30 NOTE — PROGRESS NOTES
Subjective     Iker Edge is a 19 year old male who presents to clinic today for the following health issues:    HPI   Depression and Anxiety Follow-Up    How are you doing with your depression since your last visit? No change    How are you doing with your anxiety since your last visit?  No change    Are you having other symptoms that might be associated with depression or anxiety? Yes:  insomnia, lack of concentration    Have you had a significant life event? OTHER: work stress      Do you have any concerns with your use of alcohol or other drugs? No    Patient has a difficult time describing his feelings and what he experiences.  He reports he just feels unexplained hopelesness and depression.   He states he sometimes feel that seeking help would just make him worse.    Patient states he missed the first CBT appointment last year, so he never pursued further because he felt so bad about himself for doing so.    Social History     Tobacco Use     Smoking status: Never Smoker     Smokeless tobacco: Never Used     Tobacco comment: no tobacco exposure   Substance Use Topics     Alcohol use: No     Drug use: No     PHQ 11/14/2018 4/2/2019 8/30/2019   PHQ-9 Total Score 11 17 17   Q9: Thoughts of better off dead/self-harm past 2 weeks Several days Nearly every day More than half the days   F/U: Thoughts of suicide or self-harm - - No   F/U: Safety concerns - - No     LISA-7 SCORE 11/14/2018 4/2/2019 8/30/2019   Total Score 10 12 15       In the past two weeks have you had thoughts of suicide or self-harm?  Yes  In the past two weeks have you thought of a plan or intent to harm yourself? No.  Do you have concerns about your personal safety or the safety of others?   No    Suicide Assessment Five-step Evaluation and Treatment (SAFE-T)      How many servings of fruits and vegetables do you eat daily?  2-3    On average, how many sweetened beverages do you drink each day (soda, juice, sweet tea, etc)?   1    How many days  per week do you miss taking your medication? 0, not taking any medication at this time             Patient Active Problem List   Diagnosis     Epidermal inclusion cyst     Allergic conjunctivitis, bilateral     Seasonal allergic rhinitis     Klippel Trenaunay syndrome     Venous (peripheral) insufficiency     Varicose veins of left leg with edema     Anxiety and depression     Past Surgical History:   Procedure Laterality Date     SURGICAL HISTORY OF -       bilateral myringotomy tubes       Social History     Tobacco Use     Smoking status: Never Smoker     Smokeless tobacco: Never Used     Tobacco comment: no tobacco exposure   Substance Use Topics     Alcohol use: No     Family History   Problem Relation Age of Onset     Thyroid Disease Mother         has 1/2 thyroid     Gastrointestinal Disease Mother         ulcerative colitis     Eye Disorder Paternal Grandmother         cataracts     Anemia Paternal Grandmother          Current Outpatient Medications   Medication Sig Dispense Refill     order for DME Equipment being ordered: Compression stockings - upper thigh-high - 20-30 (maximum compression) - one pair 1 each 1     sertraline (ZOLOFT) 50 MG tablet Take 1 tablet (50 mg) by mouth daily 30 tablet 0     fluticasone (FLONASE) 50 MCG/ACT spray Spray 2 sprays into both nostrils daily (Patient not taking: Reported on 10/24/2018) 16 g 6     ibuprofen 200 MG capsule Take 400 mg by mouth every 4 hours as needed for pain or fever 120 capsule      olopatadine HCl (PATADAY) 0.2 % SOLN Place 1 drop into both eyes daily (Patient not taking: Reported on 10/24/2018) 1 Bottle 6     No Known Allergies    Reviewed and updated as needed this visit by Provider  Tobacco  Allergies  Meds  Problems  Med Hx  Surg Hx  Fam Hx         Review of Systems   C: NEGATIVE for fever, chills, change in weight  I: NEGATIVE for worrisome rashes, moles or lesions  E: NEGATIVE for vision changes or irritation  E/M: NEGATIVE for ear, mouth  "and throat problems  R: NEGATIVE for significant cough or SOB  CV: NEGATIVE for chest pain, palpitations or peripheral edema  GI: NEGATIVE for nausea, abdominal pain, heartburn, or change in bowel habits  : NEGATIVE for frequency, dysuria, or hematuria  N: NEGATIVE for weakness, dizziness or paresthesias  E: NEGATIVE for temperature intolerance, skin/hair changes  PSYCHIATRIC: see above      Objective    /68   Pulse 62   Temp 98.5  F (36.9  C) (Tympanic)   Resp 14   Ht 1.765 m (5' 9.5\")   Wt 60.3 kg (133 lb)   SpO2 99%   BMI 19.36 kg/m    Body mass index is 19.36 kg/m .  Physical Exam   GENERAL: alert and no distress  EYES: no icterus, PERRLA  SKIN: no jaundice/rash  NEURO: no tremors  PSYCH: well-kempt, linear thought process, normal speech, fair insight/judgement, depressed and anxious mood, flat affect, no suicidality, no aggression, no hallucination    Diagnostic Test Results:  none         Assessment & Plan     Iker was seen today for depression and anxiety.    Diagnoses and all orders for this visit:    Moderate recurrent major depression (H)  -     sertraline (ZOLOFT) 50 MG tablet; Take 1 tablet (50 mg) by mouth daily  -     MENTAL HEALTH REFERRAL  - Adult; Outpatient Treatment; Individual/Couples/Family/Group Therapy/Health Psychology; [facility]: [facility] [phone]; We will contact you to schedule the appointment or please call with any questions      Still experiences significant mood disturbance.  Patient appeared not ready to open up to more history. Possible traumatic experiences in the past?    Discussed with patient treatment of mood disorders.  Due to severity of patient's symptoms, he will be best treated medically and with CBT.   He is open and receptive to both.  Patient comfortable on restarting sertraline as he said he felt it did improve some of his symptoms.  Reinforced healthy lifestyle.  Suicidal precautions discussed in detail.  Return precautions discussed " and given to patient.      Patient Instructions   Restart sertraline 50 mg daily.  You will be contacted in 1-2 business days to get a schedule for the behavior therapist.    Find some time to exercise.  Eat healthy meals.  Avoid too much caffeine.    Patient Education     Depression  Depression is one of the most common mental health problems today. It is not just a state of unhappiness or sadness. It is a true disease. The cause seems to be related to a decrease in chemicals that transmit signals in the brain. Having a family history of depression, alcoholism, or suicide increases the risk. Chronic illness, chronic pain, migraine headaches, and high emotional stress also increase the risk.  Depression is something we tend to recognize in others, but may have a hard time seeing in ourselves. It can show in many physical and emotional ways:    Loss of appetite    Overeating    Not being able to sleep    Sleeping too much    Tiredness not related to physical exertion    Restlessness or irritability    Slowness of movement or speech    Feeling depressed or withdrawn    Loss of interest in things you once enjoyed    Trouble concentrating, poor memory, trouble making decisions    Thoughts of harming or killing oneself, or thoughts that life is not worth living    Low self-esteem  The treatment for depression may include both medicine and psychotherapy. Antidepressants can reduce suffering and can improve the ability to function during the depressed period. Therapy can offer emotional support and help you understand emotional factors that may be causing the depression.  Home care    Ongoing care and support help people manage this disease. Find a healthcare provider and therapist who meet your needs. Seek help when you feel like you may be getting ill.    Be kind to yourself. Make it a point to do things that you enjoy (gardening, walking in nature, going to a movie). Reward yourself for small successes.    Take care of  your physical body. Eat a balanced diet (low in saturated fat and high in fruits and vegetables). Exercise at least 3 times a week for 30 minutes. Even mild-moderate exercise (like brisk walking) can make you feel better.    Don't drink alcohol, which can make depression worse.    Take medicine as prescribed.    Tell each of your healthcare providers about all of the prescription and over-the-counter medicines, vitamins, and supplements you take. Certain supplements interact with medicines and can result in dangerous side effects. Ask your pharmacist when you have questions about medicine interactions.    Talk with your family and trusted friends about your feelings and thoughts. Ask them to help you recognize behavior changes early so you can get help and, if needed, medicine can be adjusted.  Follow-up care  Follow up with your healthcare provider, or as advised.  Call 911  Call 911 if you:    Have suicidal thoughts, a suicide plan, and the means to carry out the plan; or serious thoughts of hurting someone else     Have trouble breathing    Are very confused    Feel very drowsy or have trouble awakening    Faint or lose consciousness    Have new chest pain that becomes more severe, lasts longer, or spreads into your shoulder, arm, neck, jaw, or back  When to seek medical advice  Call your healthcare provider right away if any of these happen:    Feeling extreme depression, fear, anxiety, or anger toward yourself or others    Feeling out of control    Feeling that you may try to harm yourself or another    Hearing voices that others do not hear    Seeing things that others do not see    Can t sleep or eat for 3 days in a row    Friends or family express concern over your behavior and ask you to seek help  Date Last Reviewed: 10/1/2017    [id number] [employer]. [address] [place] [address], [place] [address]. All rights reserved. This information is not intended as a substitute for professional medical  care. Always follow your healthcare professional's instructions.               Return in about 1 month (around 9/30/2019).    Paul Pacheco MD  [facility]

## 2019-08-31 ASSESSMENT — ANXIETY QUESTIONNAIRES: GAD7 TOTAL SCORE: 15

## 2019-09-30 ENCOUNTER — OFFICE VISIT (OUTPATIENT)
Dept: FAMILY MEDICINE | Facility: CLINIC | Age: 19
End: 2019-09-30
Payer: COMMERCIAL

## 2019-09-30 VITALS
HEIGHT: 70 IN | TEMPERATURE: 98.1 F | SYSTOLIC BLOOD PRESSURE: 120 MMHG | DIASTOLIC BLOOD PRESSURE: 68 MMHG | RESPIRATION RATE: 20 BRPM | BODY MASS INDEX: 18.87 KG/M2 | WEIGHT: 131.8 LBS | HEART RATE: 70 BPM | OXYGEN SATURATION: 97 %

## 2019-09-30 DIAGNOSIS — F33.1 MODERATE RECURRENT MAJOR DEPRESSION (H): Primary | ICD-10-CM

## 2019-09-30 DIAGNOSIS — Z23 NEED FOR PROPHYLACTIC VACCINATION AND INOCULATION AGAINST INFLUENZA: ICD-10-CM

## 2019-09-30 PROCEDURE — 90686 IIV4 VACC NO PRSV 0.5 ML IM: CPT | Performed by: FAMILY MEDICINE

## 2019-09-30 PROCEDURE — 99213 OFFICE O/P EST LOW 20 MIN: CPT | Mod: 25 | Performed by: FAMILY MEDICINE

## 2019-09-30 PROCEDURE — 96127 BRIEF EMOTIONAL/BEHAV ASSMT: CPT | Performed by: FAMILY MEDICINE

## 2019-09-30 PROCEDURE — 90471 IMMUNIZATION ADMIN: CPT | Performed by: FAMILY MEDICINE

## 2019-09-30 ASSESSMENT — PATIENT HEALTH QUESTIONNAIRE - PHQ9
SUM OF ALL RESPONSES TO PHQ QUESTIONS 1-9: 13
5. POOR APPETITE OR OVEREATING: SEVERAL DAYS

## 2019-09-30 ASSESSMENT — ANXIETY QUESTIONNAIRES
1. FEELING NERVOUS, ANXIOUS, OR ON EDGE: NEARLY EVERY DAY
3. WORRYING TOO MUCH ABOUT DIFFERENT THINGS: MORE THAN HALF THE DAYS
7. FEELING AFRAID AS IF SOMETHING AWFUL MIGHT HAPPEN: SEVERAL DAYS
2. NOT BEING ABLE TO STOP OR CONTROL WORRYING: SEVERAL DAYS
IF YOU CHECKED OFF ANY PROBLEMS ON THIS QUESTIONNAIRE, HOW DIFFICULT HAVE THESE PROBLEMS MADE IT FOR YOU TO DO YOUR WORK, TAKE CARE OF THINGS AT HOME, OR GET ALONG WITH OTHER PEOPLE: SOMEWHAT DIFFICULT
6. BECOMING EASILY ANNOYED OR IRRITABLE: SEVERAL DAYS
GAD7 TOTAL SCORE: 9
5. BEING SO RESTLESS THAT IT IS HARD TO SIT STILL: NOT AT ALL

## 2019-09-30 ASSESSMENT — MIFFLIN-ST. JEOR: SCORE: 1601.15

## 2019-09-30 NOTE — PROGRESS NOTES
Subjective     Iker Edge is a 19 year old male who presents to clinic today for the following health issues:    HPI   Depression and Anxiety Follow-Up    How are you doing with your depression since your last visit? Improved     How are you doing with your anxiety since your last visit?  No change    Are you having other symptoms that might be associated with depression or anxiety? No    Have you had a significant life event? OTHER: moving out of parents home - patient states he planned this for a while, patient states he has adopted well.     Do you have any concerns with your use of alcohol or other drugs? No    Social History     Tobacco Use     Smoking status: Never Smoker     Smokeless tobacco: Never Used     Tobacco comment: no tobacco exposure   Substance Use Topics     Alcohol use: No     Drug use: No     PHQ 4/2/2019 8/30/2019 9/30/2019   PHQ-9 Total Score 17 17 13   Q9: Thoughts of better off dead/self-harm past 2 weeks Nearly every day More than half the days Several days   F/U: Thoughts of suicide or self-harm - No -   F/U: Safety concerns - No -     LISA-7 SCORE 4/2/2019 8/30/2019 9/30/2019   Total Score 12 15 9       In the past two weeks have you had thoughts of suicide or self-harm?  No.    Do you have concerns about your personal safety or the safety of others?   No    Suicide Assessment Five-step Evaluation and Treatment (SAFE-T)      How many servings of fruits and vegetables do you eat daily?  4 or more    On average, how many sweetened beverages do you drink each day (soda, juice, sweet tea, etc)?   0-1    How many days per week do you miss taking your medication? 0            Patient Active Problem List   Diagnosis     Epidermal inclusion cyst     Allergic conjunctivitis, bilateral     Seasonal allergic rhinitis     Klippel Trenaunay syndrome     Venous (peripheral) insufficiency     Varicose veins of left leg with edema     Anxiety and depression     Past Surgical History:   Procedure  Laterality Date     SURGICAL HISTORY OF -       bilateral myringotomy tubes       Social History     Tobacco Use     Smoking status: Never Smoker     Smokeless tobacco: Never Used     Tobacco comment: no tobacco exposure   Substance Use Topics     Alcohol use: No     Family History   Problem Relation Age of Onset     Thyroid Disease Mother         has 1/2 thyroid     Gastrointestinal Disease Mother         ulcerative colitis     Eye Disorder Paternal Grandmother         cataracts     Anemia Paternal Grandmother      Hypertension Father          Current Outpatient Medications   Medication Sig Dispense Refill     order for DME Equipment being ordered: Compression stockings - upper thigh-high - 20-30 (maximum compression) - one pair 1 each 1     sertraline (ZOLOFT) 50 MG tablet Take 1 tablet (50 mg) by mouth daily 90 tablet 0     fluticasone (FLONASE) 50 MCG/ACT spray Spray 2 sprays into both nostrils daily (Patient not taking: Reported on 10/24/2018) 16 g 6     olopatadine HCl (PATADAY) 0.2 % SOLN Place 1 drop into both eyes daily (Patient not taking: Reported on 10/24/2018) 1 Bottle 6     No Known Allergies      Reviewed and updated as needed this visit by Provider  Tobacco  Allergies  Meds  Problems  Med Hx  Surg Hx  Fam Hx         Review of Systems   C: NEGATIVE for fever, chills, change in weight  I: NEGATIVE for worrisome rashes, moles or lesions  E: NEGATIVE for vision changes or irritation  E/M: NEGATIVE for ear, mouth and throat problems  R: NEGATIVE for significant cough or SOB  CV: NEGATIVE for chest pain, palpitations or peripheral edema  GI: NEGATIVE for nausea, abdominal pain, heartburn, or change in bowel habits  : NEGATIVE for frequency, dysuria, or hematuria  N: NEGATIVE for weakness, dizziness or paresthesias  E: NEGATIVE for temperature intolerance, skin/hair changes  PSYCHIATRIC: see above      Objective    /68 (BP Location: Right arm, Patient Position: Chair, Cuff Size: Adult  "Regular)   Pulse 70   Temp 98.1  F (36.7  C) (Tympanic)   Resp 20   Ht 1.765 m (5' 9.5\")   Wt 59.8 kg (131 lb 12.8 oz)   SpO2 97%   BMI 19.18 kg/m    Body mass index is 19.18 kg/m .  Physical Exam   GENERAL: underweight,, alert and no distress  EYES: no icterus, PERRLA  SKIN: no jaundice/rash  NEURO: no tremors  PSYCH: well-kempt, linear thought process, normal speech, good insight/judgement, normal mood, appropriate affect, no suicidality, no aggression, no hallucination    Diagnostic Test Results:  none         Assessment & Plan     Iker was seen today for depression, anxiety, flu shot and imm/inj.    Diagnoses and all orders for this visit:    Moderate recurrent major depression (H)  -     sertraline (ZOLOFT) 50 MG tablet; Take 1 tablet (50 mg) by mouth daily  Per patient, improved a lot but still can improve more.  Patient was advised possibly increasing dose. He defers.  Continue behavioral means to cope.  Return precautions discussed and given to patient.  Suicidal precautions reinforced.    Need for prophylactic vaccination and inoculation against influenza  -     INFLUENZA VACCINE IM > 6 MONTHS VALENT IIV4 [31375]  -     Vaccine Administration, Initial [10864]           Patient Instructions   You preferred to stay with sertraline 50 mg daily.  Continue behavior and lifestyle changes to cope with stress and mood changes.  If with thoughts of self harm or hurting others, have yourself brought to the ER.  Patient Education     Depression  Depression is one of the most common mental health problems today. It is not just a state of unhappiness or sadness. It is a true disease. The cause seems to be related to a decrease in chemicals that transmit signals in the brain. Having a family history of depression, alcoholism, or suicide increases the risk. Chronic illness, chronic pain, migraine headaches, and high emotional stress also increase the risk.  Depression is something we tend to recognize in others, " but may have a hard time seeing in ourselves. It can show in many physical and emotional ways:    Loss of appetite    Overeating    Not being able to sleep    Sleeping too much    Tiredness not related to physical exertion    Restlessness or irritability    Slowness of movement or speech    Feeling depressed or withdrawn    Loss of interest in things you once enjoyed    Trouble concentrating, poor memory, trouble making decisions    Thoughts of harming or killing oneself, or thoughts that life is not worth living    Low self-esteem  The treatment for depression may include both medicine and psychotherapy. Antidepressants can reduce suffering and can improve the ability to function during the depressed period. Therapy can offer emotional support and help you understand emotional factors that may be causing the depression.  Home care    Ongoing care and support help people manage this disease. Find a healthcare provider and therapist who meet your needs. Seek help when you feel like you may be getting ill.    Be kind to yourself. Make it a point to do things that you enjoy (gardening, walking in nature, going to a movie). Reward yourself for small successes.    Take care of your physical body. Eat a balanced diet (low in saturated fat and high in fruits and vegetables). Exercise at least 3 times a week for 30 minutes. Even mild-moderate exercise (like brisk walking) can make you feel better.    Don't drink alcohol, which can make depression worse.    Take medicine as prescribed.    Tell each of your healthcare providers about all of the prescription and over-the-counter medicines, vitamins, and supplements you take. Certain supplements interact with medicines and can result in dangerous side effects. Ask your pharmacist when you have questions about medicine interactions.    Talk with your family and trusted friends about your feelings and thoughts. Ask them to help you recognize behavior changes early so you can get  help and, if needed, medicine can be adjusted.  Follow-up care  Follow up with your healthcare provider, or as advised.  Call 911  Call 911 if you:    Have suicidal thoughts, a suicide plan, and the means to carry out the plan; or serious thoughts of hurting someone else     Have trouble breathing    Are very confused    Feel very drowsy or have trouble awakening    Faint or lose consciousness    Have new chest pain that becomes more severe, lasts longer, or spreads into your shoulder, arm, neck, jaw, or back  When to seek medical advice  Call your healthcare provider right away if any of these happen:    Feeling extreme depression, fear, anxiety, or anger toward yourself or others    Feeling out of control    Feeling that you may try to harm yourself or another    Hearing voices that others do not hear    Seeing things that others do not see    Can t sleep or eat for 3 days in a row    Friends or family express concern over your behavior and ask you to seek help  Date Last Reviewed: 10/1/2017    6694-7642 [employer]. [address] [place] [address], [place] [address]. All rights reserved. This information is not intended as a substitute for professional medical care. Always follow your healthcare professional's instructions.               Return in about 11 weeks (around 12/16/2019) for telephone visit.    Paul Pacheco MD  [facility]

## 2019-09-30 NOTE — PATIENT INSTRUCTIONS
You preferred to stay with sertraline 50 mg daily.  Continue behavior and lifestyle changes to cope with stress and mood changes.  If with thoughts of self harm or hurting others, have yourself brought to the ER.  Patient Education     Depression  Depression is one of the most common mental health problems today. It is not just a state of unhappiness or sadness. It is a true disease. The cause seems to be related to a decrease in chemicals that transmit signals in the brain. Having a family history of depression, alcoholism, or suicide increases the risk. Chronic illness, chronic pain, migraine headaches, and high emotional stress also increase the risk.  Depression is something we tend to recognize in others, but may have a hard time seeing in ourselves. It can show in many physical and emotional ways:    Loss of appetite    Overeating    Not being able to sleep    Sleeping too much    Tiredness not related to physical exertion    Restlessness or irritability    Slowness of movement or speech    Feeling depressed or withdrawn    Loss of interest in things you once enjoyed    Trouble concentrating, poor memory, trouble making decisions    Thoughts of harming or killing oneself, or thoughts that life is not worth living    Low self-esteem  The treatment for depression may include both medicine and psychotherapy. Antidepressants can reduce suffering and can improve the ability to function during the depressed period. Therapy can offer emotional support and help you understand emotional factors that may be causing the depression.  Home care    Ongoing care and support help people manage this disease. Find a healthcare provider and therapist who meet your needs. Seek help when you feel like you may be getting ill.    Be kind to yourself. Make it a point to do things that you enjoy (gardening, walking in nature, going to a movie). Reward yourself for small successes.    Take care of your physical body. Eat a balanced  diet (low in saturated fat and high in fruits and vegetables). Exercise at least 3 times a week for 30 minutes. Even mild-moderate exercise (like brisk walking) can make you feel better.    Don't drink alcohol, which can make depression worse.    Take medicine as prescribed.    Tell each of your healthcare providers about all of the prescription and over-the-counter medicines, vitamins, and supplements you take. Certain supplements interact with medicines and can result in dangerous side effects. Ask your pharmacist when you have questions about medicine interactions.    Talk with your family and trusted friends about your feelings and thoughts. Ask them to help you recognize behavior changes early so you can get help and, if needed, medicine can be adjusted.  Follow-up care  Follow up with your healthcare provider, or as advised.  Call 911  Call 911 if you:    Have suicidal thoughts, a suicide plan, and the means to carry out the plan; or serious thoughts of hurting someone else     Have trouble breathing    Are very confused    Feel very drowsy or have trouble awakening    Faint or lose consciousness    Have new chest pain that becomes more severe, lasts longer, or spreads into your shoulder, arm, neck, jaw, or back  When to seek medical advice  Call your healthcare provider right away if any of these happen:    Feeling extreme depression, fear, anxiety, or anger toward yourself or others    Feeling out of control    Feeling that you may try to harm yourself or another    Hearing voices that others do not hear    Seeing things that others do not see    Can t sleep or eat for 3 days in a row    Friends or family express concern over your behavior and ask you to seek help  Date Last Reviewed: 10/1/2017    0470-7420 [employer]. [address] [place] [address], [place] [address]. All rights reserved. This information is not intended as a substitute for professional medical care. Always follow your healthcare  professional's instructions.

## 2019-10-01 ASSESSMENT — ANXIETY QUESTIONNAIRES: GAD7 TOTAL SCORE: 9

## 2019-10-31 ENCOUNTER — OFFICE VISIT (OUTPATIENT)
Dept: PSYCHOLOGY | Facility: CLINIC | Age: 19
End: 2019-10-31
Attending: FAMILY MEDICINE
Payer: COMMERCIAL

## 2019-10-31 DIAGNOSIS — F33.2 MAJOR DEPRESSIVE DISORDER, RECURRENT EPISODE, SEVERE (H): Primary | ICD-10-CM

## 2019-10-31 DIAGNOSIS — F41.1 GENERALIZED ANXIETY DISORDER: ICD-10-CM

## 2019-10-31 PROCEDURE — 90791 PSYCH DIAGNOSTIC EVALUATION: CPT | Performed by: SOCIAL WORKER

## 2019-10-31 ASSESSMENT — COLUMBIA-SUICIDE SEVERITY RATING SCALE - C-SSRS
5. HAVE YOU STARTED TO WORK OUT OR WORKED OUT THE DETAILS OF HOW TO KILL YOURSELF? DO YOU INTEND TO CARRY OUT THIS PLAN?: NO
2. HAVE YOU ACTUALLY HAD ANY THOUGHTS OF KILLING YOURSELF?: YES
3. HAVE YOU BEEN THINKING ABOUT HOW YOU MIGHT KILL YOURSELF?: NO
4. HAVE YOU HAD THESE THOUGHTS AND HAD SOME INTENTION OF ACTING ON THEM?: NO
TOTAL  NUMBER OF INTERRUPTED ATTEMPTS PAST 3 MONTHS: NO
5. HAVE YOU STARTED TO WORK OUT OR WORKED OUT THE DETAILS OF HOW TO KILL YOURSELF? DO YOU INTEND TO CARRY OUT THIS PLAN?: NO
TOTAL  NUMBER OF INTERRUPTED ATTEMPTS LIFETIME: NO
6. HAVE YOU EVER DONE ANYTHING, STARTED TO DO ANYTHING, OR PREPARED TO DO ANYTHING TO END YOUR LIFE?: NO
4. HAVE YOU HAD THESE THOUGHTS AND HAD SOME INTENTION OF ACTING ON THEM?: NO
2. HAVE YOU ACTUALLY HAD ANY THOUGHTS OF KILLING YOURSELF LIFETIME?: YES
TOTAL  NUMBER OF ABORTED OR SELF INTERRUPTED ATTEMPTS PAST LIFETIME: NO
1. IN THE PAST MONTH, HAVE YOU WISHED YOU WERE DEAD OR WISHED YOU COULD GO TO SLEEP AND NOT WAKE UP?: YES
1. IN THE PAST MONTH, HAVE YOU WISHED YOU WERE DEAD OR WISHED YOU COULD GO TO SLEEP AND NOT WAKE UP?: YES
6. HAVE YOU EVER DONE ANYTHING, STARTED TO DO ANYTHING, OR PREPARED TO DO ANYTHING TO END YOUR LIFE?: NO
ATTEMPT PAST THREE MONTHS: NO
TOTAL  NUMBER OF ABORTED OR SELF INTERRUPTED ATTEMPTS PAST 3 MONTHS: NO
ATTEMPT LIFETIME: NO

## 2019-10-31 ASSESSMENT — ANXIETY QUESTIONNAIRES
IF YOU CHECKED OFF ANY PROBLEMS ON THIS QUESTIONNAIRE, HOW DIFFICULT HAVE THESE PROBLEMS MADE IT FOR YOU TO DO YOUR WORK, TAKE CARE OF THINGS AT HOME, OR GET ALONG WITH OTHER PEOPLE: VERY DIFFICULT
5. BEING SO RESTLESS THAT IT IS HARD TO SIT STILL: NOT AT ALL
3. WORRYING TOO MUCH ABOUT DIFFERENT THINGS: MORE THAN HALF THE DAYS
2. NOT BEING ABLE TO STOP OR CONTROL WORRYING: MORE THAN HALF THE DAYS
6. BECOMING EASILY ANNOYED OR IRRITABLE: SEVERAL DAYS
GAD7 TOTAL SCORE: 12
1. FEELING NERVOUS, ANXIOUS, OR ON EDGE: NEARLY EVERY DAY
7. FEELING AFRAID AS IF SOMETHING AWFUL MIGHT HAPPEN: MORE THAN HALF THE DAYS

## 2019-10-31 ASSESSMENT — PATIENT HEALTH QUESTIONNAIRE - PHQ9
SUM OF ALL RESPONSES TO PHQ QUESTIONS 1-9: 13
5. POOR APPETITE OR OVEREATING: MORE THAN HALF THE DAYS

## 2019-11-02 ASSESSMENT — ANXIETY QUESTIONNAIRES: GAD7 TOTAL SCORE: 12

## 2019-11-04 NOTE — PROGRESS NOTES
Adult Intake Structured Interview  Standard Diagnostic Assessment      CLIENT'S NAME: Iker Edge  MRN:   [id number]  :   2000  ACCT. NUMBER: [id number]  DATE OF SERVICE: 10/31/19  VIDEO VISIT: No    Identifying Information:  The client is a [age] year old, , single male. The client is employed full-time as a  at [employer] in [place], [place]. The client was referred for individual therapy by his primary care provider, Dr. Paul Pacheco. The client attended the session alone.     Client's Statement of Presenting Concern:  The client reported that he is attending individual therapy at this time because he has been experiencing symptoms of both depression and anxiety. The client stated that his symptoms have resulted in the following functional impairments including difficulty managing and completing household tasks, increased conflict in relationship(s), diminished self-care and difficulty managing and completing work related tasks.     History of Presenting Concern:  The client reported that he began experiencing symptoms of depression and anxiety approximately six years ago. He stated that he has attended family therapy, taken psychotropic medications and exercised in an attempted to manage the symptoms. The client reported that other professional(s), aside from his primary care provider, are not involved in providing support / services.     Social History:  The client reported that he grew up in [place], [place] with  parents and an older brother. He stated that his twenty five year old [place] This is an intact family and parents remain . When the client was asked about his childhood, he indicated that he spent a significant amount of time unsupervised. When asked about his  relationships with his parents, he reported that he is close to his mother and not very close to his father. He also stated that his parents frequently argue.     The client reported that he is currently in a relationship with a man whom he has been dating for three months. The client stated that he does not have any children.     The client identified some stable and meaningful social connections.     The client reported that he has not been involved with the legal system.     The client reported that he graduated from high school. After high school, he attended a culinary program and earned a culinary certificate. The client did not identify any learning problems     There are no ethnic, cultural or Islam factors that may be relevant for therapy. The client identified his preferred language to be English. The client reported that he does not need the assistance of an  or other support involved in therapy. Modifications will not be used to assist communication in therapy.     The client did not serve in the .     The client reported a family medical history that includes anemia in his paternal grandmother, eye disorder in his paternal grandmother, gastrointestinal disease in his mother, hernia in his father, hypertension in his father and thyroid disease in his mother.    Mental Health History:  The client reported that both his mother and his father have been diagnosed with depressive disorders. He stated that his mother has been diagnosed with an anxiety disorder. He denied having any other known history of mental illness in his immediate or extended family.     The client stated that he and his parents attended family therapy in 2014. He could not recall where they attended family therapy. The client indicated that, at that time, he was diagnosed with a depressive disorder and an anxiety disorder. The client denied having any history of hospitalizations due to mental illness. The client is  not currently receiving any additional mental health services.    Chemical Health History:  The client reported that his father drinks alcohol excessively. He stated that his father has never attended chemical dependency treatment. He denied having any other known history of chemical dependency in his immediate or extended family.     The client has not received chemical dependency treatment in the past. The client is not currently receiving any chemical dependency treatment. The client reported no problems as a result of his drinking / drug use.    Client Reports:  Client reports consuming alcohol four times per week. He indicates that he consumes one drink per sitting.  Client denies using tobacco.  Client denies using marijuana.  Client reports consuming approximately three cups of coffee each day.  Client denies using street drugs.  Client denies the non-medical use of prescription or over the counter drugs.    CAGE: C     Patient felt they ought to CUT down on your drinking (or drug use).   Based upon the Cage-Aid score and clinical interview, the client's chemical use will continue to be discussed.     Discussed the general effects of drugs and alcohol on health and well-being.     Significant Losses / Trauma / Abuse / Neglect Issues:  The client denied having any significant losses. He did not endorse any history of emotional abuse, physical abuse, sexual abuse, or neglect. He also denied any other history of significant trauma.     Issues of possible neglect are not present.    Medical Issues:  The client has not had a physical examination to rule out medical causes for current symptoms. The client's primary care provider is Dr. Paul Pacheco. The client stated that he does not have a psychiatrist. The client reported being diagnosed with asthma and Klippel Trenaunay syndrome. The client denied the presence of chronic or episodic pain. The client stated that he has concerns about his eating habits. He  indicated that he has a tendency to fast for periods of time and then he eats to catch up.    Client reports current meds as:   Outpatient Medications Marked as Taking for the 10/31/19 encounter (Office Visit) with Leticia Goodman, JORGITOW   Medication Sig     sertraline (ZOLOFT) 50 MG tablet Take 1 tablet (50 mg) by mouth daily     Client Allergies:  The client reported having no known allergies to medications.    Medical History:  Past Medical History:   Diagnosis Date     Unspecified asthma(493.90)     reactive airway     Medication Adherence:  The client reported taking prescribed medications as prescribed.    Mental Status Assessment:  Appearance:   Appropriate   Eye Contact:   Good   Psychomotor Behavior: Normal   Attitude:   Cooperative   Orientation:   All  Speech   Rate / Production: Normal    Volume:  Soft   Mood:    Depressed   Affect:    Appropriate   Thought Content:  Clear   Thought Form:  Coherent  Logical   Insight:    Good     Review of Symptoms:  Depression: Sleep Interest Energy Concentration Appetite Worthless Depressed Mood  Sherry:  No symptoms  Psychosis: No symptoms  Anxiety: Worries Nervousness  Panic:  No symptoms  Post Traumatic Stress Disorder: No symptoms  Obsessive Compulsive Disorder: No symptoms  Eating Disorder: No symptoms  Oppositional Defiant Disorder: No symptoms  ADD / ADHD: No symptoms  Conduct Disorder: No symptoms      Safety Assessment:    History of Safety Concerns:   Client reported experiencing some passive suicidal ideation. He denied having an intent or a plan.  Client denied a history of suicide attempts.    Client denied a history of homicidal ideation.    Client denied a history of self-injurious ideation and behaviors.    Client denied a history of personal safety concerns.    Client denied a history of assaultive behaviors.      Current Safety Concerns:  Client denies current suicidal ideation.    Client denies current homicidal ideation and behaviors.  Client denies  current self-injurious ideation and behaviors.    Client denies current concerns for personal safety.    Client reports the following protective factors: positive relationships positive social network and positive family connections, forward/future oriented thinking, dedication to family/friends, safe and stable environment, regular physical activity, adherence with prescribed medication, living with other people, daily obligations, structured day and committment to well-being    Client reports there are no firearms in the house.     Plan for Safety and Risk Management:  Recommended that the client call 911 or go to the local emergency department should there be a change in any of these risk factors.    Client's Strengths and Limitations:  The client reported that support from his mother, support from his boyfriend, support from his employers and support from his friends will help him succeed in counseling. He stated that work related stress and financial stress may interfere with his success in counseling.     Diagnostic Criteria:    Generalized Anxiety Disorder:  A. Excessive anxiety and worry about a number of events or activities.   B. The person finds it difficult to control the worry.  C. Symptoms of anxiety.   - Being easily fatigued.    - Difficulty concentrating.    - Irritability.    - Muscle tension.    - Sleep disturbance.  D. The focus of the anxiety and worry is not confined to features of another disorder.  E. The anxiety, worry, or physical symptoms cause clinically significant distress or impairment in social, occupational, or other important areas of functioning.   F. The disturbance is not due to the direct physiological effects of a substance (e.g., a drug of abuse, a medication) or a general medical condition (e.g., hyperthyroidism) and does not occur exclusively during a Mood Disorder, a Psychotic Disorder, or a Pervasive Developmental Disorder.    Major Depressive Disorder, Recurrent Episode,  Severe:  A) Recurrent episode(s) - symptoms have been present during the same 2-week period and represent a change from previous functioning    - Depressed mood.      - Diminished interest or pleasure in almost all activities.    - Appetite disturbance.    - Sleep disturbance.    - Fatigue and loss of energy.    - Feelings of worthlessness.    - Diminished ability to concentrate.    - Recurrent suicidal ideation without a specific plan or intent.    B) The symptoms cause clinically significant distress or impairment in social, occupational, or other important areas of functioning  C) The episode is not attributable to the physiological effects of a substance or to another medical condition  D) The occurence of major depressive episode is not better explained by other thought / psychotic disorders  E) There has never been a manic episode or hypomanic episode    Functional Status:  The client's symptoms are causing reduced functional status in the following areas including difficulty managing and completing household tasks, increased conflict in relationship(s), diminished self-care and difficulty managing and completing work related tasks.    DSM5 Diagnoses: (Sustained by DSM5 Criteria Listed Above)  Diagnoses: 296.33 (F33.2) Major Depressive Disorder, Recurrent Episode, Severe   300.02 (F41.1) Generalized Anxiety Disorder  WHODAS 2.0 (12 item)            This questionnaire asks about difficulties due to health conditions. Health conditions  include  disease or illnesses, other health problems that may be short or long lasting,  injuries, mental health or emotional problems, and problems with alcohol or drugs.                     Think back over the past 30 days and answer these questions, thinking about how much  difficulty you had doing the following activities. For each question, please Knik only  one response.    S1 Standing for long periods such as 30 minutes? None =         1   S2 Taking care of household  responsibilities? Moderate =   3   S3 Learning a new task, for example, learning how to get to a new place? Mild =           2   S4 How much of a problem do you have joining community activities (for example, festivals, Orthodox or other activities) in the same way as anyone else can? Severe =       4   S5 How much have you been emotionally affected by your health problems? Mild =           2     In the past 30 days, how much difficulty did you have in:   S6 Concentrating on doing something for ten minutes? Moderate =   3   S7 Walking a long distance such as a kilometer (or equivalent)? None =         1   S8 Washing your whole body? None =         1   S9 Getting dressed? None =         1   S10 Dealing with people you do not know? Severe =       4   S11 Maintaining a friendship? Moderate =   3   S12 Your day to day work? Moderate =   3     H1 Overall, in the past 30 days, how many days were these difficulties present? Record number of days 15   H2 In the past 30 days, for how many days were you totally unable to carry out your usual activities or work because of any health condition? Record number of days  0   H3 In the past 30 days, not counting the days that you were totally unable, for how many days did you cut back or reduce your usual activities or work because of any health condition? Record number of days 5     Attendance Agreement:  The client has signed the attendance agreement.    Collaboration:  Collaboration / coordination with other professionals is not indicated at this time.      Preliminary Treatment Plan:  The client reported no currently identified Orthodox, ethnic or cultural issues relevant to therapy.     services are not indicated.    Modifications to assist communication are not indicated.    The concerns identified by the client will be addressed in therapy.    Initial treatment will focus on assisting the client in learning and applying skills to manage the symptoms of depression  and anxiety he has been experiencing.     As a preliminary treatment goal, client will develop more effective coping skills to manage depressive symptoms and will develop more effective coping skills to manage anxiety symptoms.    The focus of initial interventions will be to increase coping skills.    Referral to another professional/service is not indicated at this time.    A release of information is not needed at this time.    Report to child / adult protection services was NA.    The client will have open access to his mental health medical record.    Leticia Goodman M.S.W., L.I.C.S.W.  November 4, 2019

## 2019-11-07 ENCOUNTER — OFFICE VISIT (OUTPATIENT)
Dept: PSYCHOLOGY | Facility: CLINIC | Age: 19
End: 2019-11-07
Attending: FAMILY MEDICINE
Payer: COMMERCIAL

## 2019-11-07 DIAGNOSIS — F41.1 GENERALIZED ANXIETY DISORDER: ICD-10-CM

## 2019-11-07 DIAGNOSIS — F33.2 MAJOR DEPRESSIVE DISORDER, RECURRENT EPISODE, SEVERE (H): Primary | ICD-10-CM

## 2019-11-07 PROCEDURE — 90834 PSYTX W PT 45 MINUTES: CPT | Performed by: SOCIAL WORKER

## 2019-11-08 NOTE — PROGRESS NOTES
Progress Note    Patient Name: Iker Edge  Date: 11/7/2019         Service Type: Individual  Video Visit: No     Session Start Time: 9:30 AM  Session End Time: 10:20 AM     Session Length: 50 Minutes    Session #: 2    Attendees: Client     Treatment Plan Last Reviewed: N/A  PHQ-9 / LISA-7 : 13/12    DATA  Interactive Complexity: No  Crisis: No       Progress Since Last Session (Related to Symptoms / Goals / Homework):   Symptoms: No change : The client reported no change in his symptoms since the previous individual therapy session.    Homework: N/A      Episode of Care Goals: N/A     Current / Ongoing Stressors and Concerns:   The client reported that his primary stressor is managing the symptoms of depression and anxiety he has been experiencing.     Treatment Objective(s) Addressed in This Session:   The client completed the treatment plan during the individual therapy session.     Intervention:   N/A        ASSESSMENT: Current Emotional / Mental Status (status of significant symptoms):   Risk status (Self / Other harm or suicidal ideation)   Client denies current fears or concerns for personal safety.   Client denies current or recent suicidal ideation or behaviors.   Client denies current or recent homicidal ideation or behaviors.   Client denies current or recent self injurious behavior or ideation.   Client denies other safety concerns.   Client reports there has been no change in risk factors since his last session.     Client reports there has been no change in protective factors since his last session.     Recommended that the client call 911 or go to the local emergency department should there be a change in any of these risk factors.     Appearance:   Appropriate    Eye Contact:   Good    Psychomotor Behavior: Normal    Attitude:   Cooperative    Orientation:   All   Speech    Rate / Production: Normal     Volume:  Soft    Mood:    Anxious     Affect:    Constricted    Thought Content:  Clear    Thought Form:  Coherent  Logical    Insight:    Good      Medication Review:   No changes to current psychiatric medication(s).     Medication Compliance:   Yes     Changes in Health Issues:   None reported.     Chemical Use Review:   Substance Use: Chemical use reviewed. Will continue to be assessed.      Tobacco Use: No current tobacco use.      Diagnosis:  1. Major depressive disorder, recurrent episode, severe (H)    2. Generalized anxiety disorder        Collateral Reports Completed:   Not Applicable    PLAN: (Patient Tasks / Therapist Tasks / Other)  N/A      SUNSHINE HoffmannSEVANGELIST, ATIYA.                                                         ______________________________________________________________________    Treatment Plan    Patient's Name: Iker Edge  YOB: 2000    Date: 11/7/2019    DSM5 Diagnoses:       296.33 (F33.2) Major Depressive Disorder, Recurrent Episode, Severe    300.02 (F41.1) Generalized Anxiety Disorder    Psychosocial / Contextual Factors: The client is a [age] year old, , single male. The client is employed full-time as a  at [employer] in [place], [place]. The client reported that he is attending individual therapy at this time because he has been experiencing symptoms of both depression and anxiety.    WHODAS: 28    Referral / Collaboration:  Referral to another professional/service is not indicated at this time.    Anticipated number of session or this episode of care: 12    MeasurableTreatment Goal(s) related to diagnosis / functional impairment(s)  Goal 1: The client will learn and apply skills to manage the symptoms of depression he has been experiencing.    I will know I've met my goal when I don't feel so down and depressed.      Objective #A   The client will decrease his frequency and intensity of feeling down, depressed and hopeless.  Status: New - Date: 11/7/2019  "    Intervention(s)  Therapist will assist and support the client in decreasing his frequency and intensity of feeling down, depressed and hopeless.    Objective #B  The client will improve his concentration, focus, and ability to be mindful in daily activities.   Status: New - Date: 11/7/2019     Intervention(s)  Therapist will assist and support the client in improving his concentration, focus, and ability to be mindful in daily activities.    Objective #C  The client will decrease his frequency and intensity of suicidal ideation.  Status: New - Date: 11/7/2019     Intervention(s)  Therapist will assist and support the client in decreasing his frequency and intensity of suicidal ideation.      Goal 2: The client will learn and apply skills to manage the symptoms of anxiety he has been experiencing.    I will know I've met my goal when I don't worry so much.       Objective #A  The client will use \"worry time\" each day for thirty minutes of scheduled worry and then defer obsessive or anxious thinking until the next structured \"worry time\".    Status: New - Date: 11/7/2019     Intervention(s)  Therapist will teach and support the client in learning and using \"worry time\".    Objective #B  The client will use relaxation strategies two times per day to reduce the physical symptoms of anxiety.  Status: New - Date: 11/7/2019     Intervention(s)  Therapist will teach and support the client in learning and using relaxation strategies.    Objective #C  The client will use cognitive strategies identified in therapy to challenge anxious thoughts.    Status: New - Date: 11/7/2019     Intervention(s)  Therapist will teach and support the client in learning and using cognitive strategies to challenge anxious thoughts.     Objective #D  The client will learn how to use his senses to ground himself in his body.    Status: New - Date: 11/7/2019     Intervention(s)  Therapist will  and support the client in learning how to use " his senses to ground himself in his body.    Objective #E  The client will be mindful of the body sensations he is experiencing when he is feeling anxious.    Status: New - Date: 11/7/2019     Intervention(s)  Therapist will  and support the client in being mindful of the body sensations he is experiencing when he is feeling anxious.      The client has reviewed and agreed to the above plan.      Leticia Goodman M.S.GUERITA., L.I.C.S.W.  November 8, 2019

## 2019-12-05 ENCOUNTER — OFFICE VISIT (OUTPATIENT)
Dept: PSYCHOLOGY | Facility: CLINIC | Age: 19
End: 2019-12-05
Payer: COMMERCIAL

## 2019-12-05 DIAGNOSIS — F33.2 MAJOR DEPRESSIVE DISORDER, RECURRENT EPISODE, SEVERE (H): ICD-10-CM

## 2019-12-05 DIAGNOSIS — F41.1 GENERALIZED ANXIETY DISORDER: Primary | ICD-10-CM

## 2019-12-05 PROCEDURE — 90834 PSYTX W PT 45 MINUTES: CPT | Performed by: SOCIAL WORKER

## 2019-12-06 NOTE — PROGRESS NOTES
Progress Note    Patient Name: Iker Edge  Date: 12/5/2019         Service Type: Individual  Video Visit: No     Session Start Time: 10:30 AM  Session End Time: 11:20 AM     Session Length: 50 Minutes    Session #: 3    Attendees: Client     Treatment Plan Last Reviewed: 11/7/2019  PHQ-9 / LISA-7 : 13/12    DATA  Interactive Complexity: No  Crisis: No       Progress Since Last Session (Related to Symptoms / Goals / Homework):   Symptoms: Improving : The client reported feeling less anxious since the previous individual therapy session.    Homework: N/A      Episode of Care Goals: Satisfactory progress - PREPARATION (Decided to change - considering how); Intervened by negotiating a change plan and determining options / strategies for behavior change, identifying triggers, exploring social supports, and working towards setting a date to begin behavior change.     Current / Ongoing Stressors and Concerns:   The client reported that his primary stressor is managing the symptoms of depression and anxiety he has been experiencing.     Treatment Objective(s) Addressed in This Session:   The client will use relaxation strategies two times per day to reduce the physical symptoms of anxiety.     Intervention:   Examined the client's decreased anxiety. Taught and practiced using a relaxation strategy in vivo. Obtained a commitment from the client to use the relaxation strategy daily.        ASSESSMENT: Current Emotional / Mental Status (status of significant symptoms):   Risk status (Self / Other harm or suicidal ideation)   Client denies current fears or concerns for personal safety.   Client denies current or recent suicidal ideation or behaviors.   Client denies current or recent homicidal ideation or behaviors.   Client denies current or recent self injurious behavior or ideation.   Client denies other safety concerns.   Client reports there has been no change in risk factors  since his last session.     Client reports there has been no change in protective factors since his last session.     Recommended that the client call 911 or go to the local emergency department should there be a change in any of these risk factors.     Appearance:   Appropriate    Eye Contact:   Good    Psychomotor Behavior: Normal    Attitude:   Cooperative    Orientation:   All   Speech    Rate / Production: Normal     Volume:  Normal    Mood:    Anxious    Affect:    Constricted    Thought Content:  Clear    Thought Form:  Coherent  Logical    Insight:    Good      Medication Review:   No changes to current psychiatric medication(s).     Medication Compliance:   Yes     Changes in Health Issues:   None reported.     Chemical Use Review:   Substance Use: Chemical use reviewed. Will continue to be assessed.      Tobacco Use: No current tobacco use.      Diagnosis:  1. Generalized anxiety disorder    2. Major depressive disorder, recurrent episode, severe (H)        Collateral Reports Completed:   Not Applicable    PLAN: (Patient Tasks / Therapist Tasks / Other)  Use the relaxation strategy daily.      Leticia Goodman M.S.GUERITA., ATIYA.                                                         ______________________________________________________________________    Treatment Plan    Patient's Name: Iker Edge  YOB: 2000    Date: 11/7/2019    DSM5 Diagnoses:       296.33 (F33.2) Major Depressive Disorder, Recurrent Episode, Severe    300.02 (F41.1) Generalized Anxiety Disorder    Psychosocial / Contextual Factors: The client is a [age] year old, , single male. The client is employed full-time as a  at [email or web address] in [place], [place]. The client reported that he is attending individual therapy at this time because he has been experiencing symptoms of both depression and anxiety.    WHODAS: 28    Referral / Collaboration:  Referral to another professional/service is not indicated  "at this time.    Anticipated number of session or this episode of care: 12    MeasurableTreatment Goal(s) related to diagnosis / functional impairment(s)  Goal 1: The client will learn and apply skills to manage the symptoms of depression he has been experiencing.    I will know I've met my goal when I don't feel so down and depressed.      Objective #A   The client will decrease his frequency and intensity of feeling down, depressed and hopeless.  Status: New - Date: 11/7/2019     Intervention(s)  Therapist will assist and support the client in decreasing his frequency and intensity of feeling down, depressed and hopeless.    Objective #B  The client will improve his concentration, focus, and ability to be mindful in daily activities.   Status: New - Date: 11/7/2019     Intervention(s)  Therapist will assist and support the client in improving his concentration, focus, and ability to be mindful in daily activities.    Objective #C  The client will decrease his frequency and intensity of suicidal ideation.  Status: New - Date: 11/7/2019     Intervention(s)  Therapist will assist and support the client in decreasing his frequency and intensity of suicidal ideation.      Goal 2: The client will learn and apply skills to manage the symptoms of anxiety he has been experiencing.    I will know I've met my goal when I don't worry so much.       Objective #A  The client will use \"worry time\" each day for thirty minutes of scheduled worry and then defer obsessive or anxious thinking until the next structured \"worry time\".    Status: New - Date: 11/7/2019     Intervention(s)  Therapist will teach and support the client in learning and using \"worry time\".    Objective #B  The client will use relaxation strategies two times per day to reduce the physical symptoms of anxiety.  Status: New - Date: 11/7/2019     Intervention(s)  Therapist will teach and support the client in learning and using relaxation strategies.    Objective " #C  The client will use cognitive strategies identified in therapy to challenge anxious thoughts.    Status: New - Date: 11/7/2019     Intervention(s)  Therapist will teach and support the client in learning and using cognitive strategies to challenge anxious thoughts.     Objective #D  The client will learn how to use his senses to ground himself in his body.    Status: New - Date: 11/7/2019     Intervention(s)  Therapist will  and support the client in learning how to use his senses to ground himself in his body.    Objective #E  The client will be mindful of the body sensations he is experiencing when he is feeling anxious.    Status: New - Date: 11/7/2019     Intervention(s)  Therapist will  and support the client in being mindful of the body sensations he is experiencing when he is feeling anxious.      The client has reviewed and agreed to the above plan.      Leticia Goodman M.S.GUERITA., L.I.C.S.W.  November 8, 2019

## 2019-12-16 ENCOUNTER — VIRTUAL VISIT (OUTPATIENT)
Dept: FAMILY MEDICINE | Facility: CLINIC | Age: 19
End: 2019-12-16
Payer: COMMERCIAL

## 2019-12-16 DIAGNOSIS — Q87.2 KLIPPEL TRENAUNAY SYNDROME: ICD-10-CM

## 2019-12-16 DIAGNOSIS — F33.1 MODERATE RECURRENT MAJOR DEPRESSION (H): ICD-10-CM

## 2019-12-16 PROCEDURE — 99441 ZZC PHYSICIAN TELEPHONE EVALUATION 5-10 MIN: CPT | Performed by: FAMILY MEDICINE

## 2019-12-16 ASSESSMENT — PATIENT HEALTH QUESTIONNAIRE - PHQ9
5. POOR APPETITE OR OVEREATING: MORE THAN HALF THE DAYS
SUM OF ALL RESPONSES TO PHQ QUESTIONS 1-9: 7

## 2019-12-16 ASSESSMENT — ANXIETY QUESTIONNAIRES
1. FEELING NERVOUS, ANXIOUS, OR ON EDGE: MORE THAN HALF THE DAYS
IF YOU CHECKED OFF ANY PROBLEMS ON THIS QUESTIONNAIRE, HOW DIFFICULT HAVE THESE PROBLEMS MADE IT FOR YOU TO DO YOUR WORK, TAKE CARE OF THINGS AT HOME, OR GET ALONG WITH OTHER PEOPLE: VERY DIFFICULT
GAD7 TOTAL SCORE: 10
5. BEING SO RESTLESS THAT IT IS HARD TO SIT STILL: NOT AT ALL
7. FEELING AFRAID AS IF SOMETHING AWFUL MIGHT HAPPEN: MORE THAN HALF THE DAYS
6. BECOMING EASILY ANNOYED OR IRRITABLE: SEVERAL DAYS
3. WORRYING TOO MUCH ABOUT DIFFERENT THINGS: SEVERAL DAYS
2. NOT BEING ABLE TO STOP OR CONTROL WORRYING: MORE THAN HALF THE DAYS

## 2019-12-16 NOTE — PROGRESS NOTES
Subjective     Iker Edge is a 19 year old male who presents to clinic today for the following health issues:  Chief Complaint   Patient presents with     Depression     f/u     HPI   Start 335pm    Depression Followup    How are you doing with your depression since your last visit? Improved slightly    Are you having other symptoms that might be associated with depression? Yes:  poor appetite    Have you had a significant life event?  OTHER: has moved into rental house with 3 roomates, going well     Are you feeling anxious or having panic attacks?   Yes:  feeling anxious but no panic attacks    Do you have any concerns with your use of alcohol or other drugs? No    Social History     Tobacco Use     Smoking status: Never Smoker     Smokeless tobacco: Never Used     Tobacco comment: no tobacco exposure   Substance Use Topics     Alcohol use: No     Drug use: No     PHQ 9/30/2019 10/31/2019 12/16/2019   PHQ-9 Total Score 13 13 7   Q9: Thoughts of better off dead/self-harm past 2 weeks Several days Several days Not at all   F/U: Thoughts of suicide or self-harm - - -   F/U: Safety concerns - - -     LISA-7 SCORE 9/30/2019 10/31/2019 12/16/2019   Total Score 9 12 10     Last PHQ-9 12/16/2019   1.  Little interest or pleasure in doing things 1   2.  Feeling down, depressed, or hopeless 2   3.  Trouble falling or staying asleep, or sleeping too much 1   4.  Feeling tired or having little energy 0   5.  Poor appetite or overeating 2   6.  Feeling bad about yourself 1   7.  Trouble concentrating 0   8.  Moving slowly or restless 0   Q9: Thoughts of better off dead/self-harm past 2 weeks 0   PHQ-9 Total Score 7   Difficulty at work, home, or with people Somewhat difficult   In the past two weeks have you had thoughts of suicide or self harm? -   Do you have concerns about your personal safety or the safety of others? -     LISA-7  12/16/2019   1. Feeling nervous, anxious, or on edge 2   2. Not being able to stop or  control worrying 2   3. Worrying too much about different things 1   4. Trouble relaxing 2   5. Being so restless that it is hard to sit still 0   6. Becoming easily annoyed or irritable 1   7. Feeling afraid, as if something awful might happen 2   LISA-7 Total Score 10   If you checked any problems, how difficult have they made it for you to do your work, take care of things at home, or get along with other people? Very difficult     In the past two weeks have you had thoughts of suicide or self-harm?  No.    Do you have concerns about your personal safety or the safety of others?   No    Suicide Assessment Five-step Evaluation and Treatment (SAFE-T)      How many servings of fruits and vegetables do you eat daily?  2-3    On average, how many sweetened beverages do you drink each day (Examples: soda, juice, sweet tea, etc.  Do NOT count diet or artificially sweetened beverages)?   1 per week    How many days per week do you miss taking your medication? 0        Patient Active Problem List   Diagnosis     Epidermal inclusion cyst     Allergic conjunctivitis, bilateral     Seasonal allergic rhinitis     Klippel Trenaunay syndrome     Venous (peripheral) insufficiency     Varicose veins of left leg with edema     Anxiety and depression     Past Surgical History:   Procedure Laterality Date     SURGICAL HISTORY OF -       bilateral myringotomy tubes       Social History     Tobacco Use     Smoking status: Never Smoker     Smokeless tobacco: Never Used     Tobacco comment: no tobacco exposure   Substance Use Topics     Alcohol use: No     Family History   Problem Relation Age of Onset     Thyroid Disease Mother         has 1/2 thyroid     Gastrointestinal Disease Mother         ulcerative colitis     Depression Mother      Anxiety Disorder Mother      Eye Disorder Paternal Grandmother         cataracts     Anemia Paternal Grandmother      Hypertension Father      Depression Father      Substance Abuse Father      Hernia  Father          Current Outpatient Medications   Medication Sig Dispense Refill     sertraline (ZOLOFT) 50 MG tablet Take 1 tablet (50 mg) by mouth daily 90 tablet 1     fluticasone (FLONASE) 50 MCG/ACT spray Spray 2 sprays into both nostrils daily (Patient not taking: Reported on 10/24/2018) 16 g 6     olopatadine HCl (PATADAY) 0.2 % SOLN Place 1 drop into both eyes daily (Patient not taking: Reported on 10/24/2018) 1 Bottle 6     order for DME Equipment being ordered: Compression stockings - upper thigh-high - 20-30 (maximum compression) - one pair 1 each 1     No Known Allergies  Reviewed and updated as needed this visit by Provider         Review of Systems   C: NEGATIVE for fever, chills, change in weight  I: NEGATIVE for worrisome rashes, moles or lesions  E: NEGATIVE for vision changes or irritation  E/M: NEGATIVE for ear, mouth and throat problems  R: NEGATIVE for significant cough or SOB  CV: NEGATIVE for chest pain, palpitations or peripheral edema  GI: NEGATIVE for nausea, abdominal pain, heartburn, or change in bowel habits  : NEGATIVE for frequency, dysuria, or hematuria  N: NEGATIVE for weakness, dizziness or paresthesias  E: NEGATIVE for temperature intolerance, skin/hair changes  PSYCHIATRIC: see above      Objective    There were no vitals taken for this visit.  There is no height or weight on file to calculate BMI.  Physical Exam   Patient sounded calm and coherent on phone encounter.    Diagnostic Test Results:  none         Assessment & Plan     Jack was seen today for depression.    Diagnoses and all orders for this visit:    Moderate recurrent major depression (H)  -     sertraline (ZOLOFT) 50 MG tablet; Take 1 tablet (50 mg) by mouth daily  Stable mood.  Continue current med dose.  Suicidal precautions reinforced.    Klippel Trenaunay syndrome  -     order for DME; Equipment being ordered: Compression stockings - upper thigh-high - 20-30 (maximum compression) - one pair  Patient asked at  wrap up if new Rx can be written for his compression stockings. Printed and signed Rx.       End: 340pm    There are no Patient Instructions on file for this visit.    No follow-ups on file.    Paul Pacheco MD  [facility]

## 2019-12-17 ASSESSMENT — ANXIETY QUESTIONNAIRES: GAD7 TOTAL SCORE: 10

## 2019-12-18 ENCOUNTER — OFFICE VISIT (OUTPATIENT)
Dept: PSYCHOLOGY | Facility: CLINIC | Age: 19
End: 2019-12-18
Payer: COMMERCIAL

## 2019-12-18 DIAGNOSIS — F41.1 GENERALIZED ANXIETY DISORDER: Primary | ICD-10-CM

## 2019-12-18 DIAGNOSIS — F33.2 MAJOR DEPRESSIVE DISORDER, RECURRENT EPISODE, SEVERE (H): ICD-10-CM

## 2019-12-18 PROCEDURE — 90834 PSYTX W PT 45 MINUTES: CPT | Performed by: SOCIAL WORKER

## 2019-12-19 NOTE — PROGRESS NOTES
Progress Note    Patient Name: Iker Edge  Date: 12/18/2019         Service Type: Individual  Video Visit: No     Session Start Time: 11:30 AM  Session End Time: 12:20 PM     Session Length: 50 Minutes    Session #: 4    Attendees: Client     Treatment Plan Last Reviewed: 11/7/2019  PHQ-9 / LISA-7 : 13/12    DATA  Interactive Complexity: No  Crisis: No       Progress Since Last Session (Related to Symptoms / Goals / Homework):   Symptoms: Improving : The client reported feeling less anxious since the previous individual therapy session.    Homework: Achieved / completed to satisfaction. The client reported that he has been using the relaxation strategy daily.      Episode of Care Goals: Satisfactory progress - ACTION (Actively working towards change); Intervened by reinforcing change plan / affirming steps taken.     Current / Ongoing Stressors and Concerns:   The client reported that his primary stressor is managing the symptoms of depression and anxiety he has been experiencing.     Treatment Objective(s) Addressed in This Session:   The client will use relaxation strategies two times per day to reduce the physical symptoms of anxiety.     Intervention:   Discussed and reinforced the client's efforts to use the relaxation strategy daily. Obtained a commitment from the client to continue to use the relaxation strategy daily. Talked at length about the client's work related stress.        ASSESSMENT: Current Emotional / Mental Status (status of significant symptoms):   Risk status (Self / Other harm or suicidal ideation)   Client denies current fears or concerns for personal safety.   Client denies current or recent suicidal ideation or behaviors.   Client denies current or recent homicidal ideation or behaviors.   Client denies current or recent self injurious behavior or ideation.   Client denies other safety concerns.   Client reports there has been no change in risk  factors since his last session.     Client reports there has been no change in protective factors since his last session.     Recommended that the client call 911 or go to the local emergency department should there be a change in any of these risk factors.     Appearance:   Appropriate    Eye Contact:   Good    Psychomotor Behavior: Normal    Attitude:   Cooperative    Orientation:   All   Speech    Rate / Production: Normal     Volume:  Normal    Mood:    Anxious    Affect:    Constricted    Thought Content:  Clear    Thought Form:  Coherent  Logical    Insight:    Good      Medication Review:   No changes to current psychiatric medication(s).     Medication Compliance:   Yes     Changes in Health Issues:   None reported.     Chemical Use Review:   Substance Use: Chemical use reviewed. Will continue to be assessed.      Tobacco Use: No current tobacco use.      Diagnosis:  1. Generalized anxiety disorder    2. Major depressive disorder, recurrent episode, severe (H)        Collateral Reports Completed:   Not Applicable    PLAN: (Patient Tasks / Therapist Tasks / Other)  Continue to use the relaxation strategy daily.      Leticia Goodman M.S.GUERITA., BENIGNOS.GUERITA.                                                         ______________________________________________________________________    Treatment Plan    Patient's Name: Iker Edge  YOB: 2000    Date: 11/7/2019    DSM5 Diagnoses:       296.33 (F33.2) Major Depressive Disorder, Recurrent Episode, Severe    300.02 (F41.1) Generalized Anxiety Disorder    Psychosocial / Contextual Factors: The client is a [age] year old, , single male. The client is employed full-time as a  at [employer] in [place], [place]. The client reported that he is attending individual therapy at this time because he has been experiencing symptoms of both depression and anxiety.    WHODAS: 28    Referral / Collaboration:  Referral to another  "professional/service is not indicated at this time.    Anticipated number of session or this episode of care: 12    MeasurableTreatment Goal(s) related to diagnosis / functional impairment(s)  Goal 1: The client will learn and apply skills to manage the symptoms of depression he has been experiencing.    I will know I've met my goal when I don't feel so down and depressed.      Objective #A   The client will decrease his frequency and intensity of feeling down, depressed and hopeless.  Status: New - Date: 11/7/2019     Intervention(s)  Therapist will assist and support the client in decreasing his frequency and intensity of feeling down, depressed and hopeless.    Objective #B  The client will improve his concentration, focus, and ability to be mindful in daily activities.   Status: New - Date: 11/7/2019     Intervention(s)  Therapist will assist and support the client in improving his concentration, focus, and ability to be mindful in daily activities.    Objective #C  The client will decrease his frequency and intensity of suicidal ideation.  Status: New - Date: 11/7/2019     Intervention(s)  Therapist will assist and support the client in decreasing his frequency and intensity of suicidal ideation.      Goal 2: The client will learn and apply skills to manage the symptoms of anxiety he has been experiencing.    I will know I've met my goal when I don't worry so much.       Objective #A  The client will use \"worry time\" each day for thirty minutes of scheduled worry and then defer obsessive or anxious thinking until the next structured \"worry time\".    Status: New - Date: 11/7/2019     Intervention(s)  Therapist will teach and support the client in learning and using \"worry time\".    Objective #B  The client will use relaxation strategies two times per day to reduce the physical symptoms of anxiety.  Status: New - Date: 11/7/2019     Intervention(s)  Therapist will teach and support the client in learning and " using relaxation strategies.    Objective #C  The client will use cognitive strategies identified in therapy to challenge anxious thoughts.    Status: New - Date: 11/7/2019     Intervention(s)  Therapist will teach and support the client in learning and using cognitive strategies to challenge anxious thoughts.     Objective #D  The client will learn how to use his senses to ground himself in his body.    Status: New - Date: 11/7/2019     Intervention(s)  Therapist will  and support the client in learning how to use his senses to ground himself in his body.    Objective #E  The client will be mindful of the body sensations he is experiencing when he is feeling anxious.    Status: New - Date: 11/7/2019     Intervention(s)  Therapist will  and support the client in being mindful of the body sensations he is experiencing when he is feeling anxious.      The client has reviewed and agreed to the above plan.      Leticia Goodman M.S.W., L.I.C.S.W.  November 8, 2019

## 2020-01-13 ENCOUNTER — OFFICE VISIT (OUTPATIENT)
Dept: PSYCHOLOGY | Facility: CLINIC | Age: 20
End: 2020-01-13
Payer: COMMERCIAL

## 2020-01-13 DIAGNOSIS — F41.1 GENERALIZED ANXIETY DISORDER: Primary | ICD-10-CM

## 2020-01-13 DIAGNOSIS — F33.2 MAJOR DEPRESSIVE DISORDER, RECURRENT EPISODE, SEVERE (H): ICD-10-CM

## 2020-01-13 PROCEDURE — 90834 PSYTX W PT 45 MINUTES: CPT | Performed by: SOCIAL WORKER

## 2020-01-13 NOTE — PROGRESS NOTES
"                                           Progress Note    Patient Name: Iker Edge  Date: 1/13/2020         Service Type: Individual  Video Visit: No     Session Start Time: 2:00 PM  Session End Time: 2:50 PM     Session Length: 50 Minutes    Session #: 5    Attendees: Client     Treatment Plan Last Reviewed: 11/7/2019  PHQ-9 / LISA-7 : 13/12    DATA  Interactive Complexity: No  Crisis: No       Progress Since Last Session (Related to Symptoms / Goals / Homework):   Symptoms: Improving : The client reported feeling less anxious since the previous individual therapy session.    Homework: Achieved / completed to satisfaction. The client reported that he has been using the relaxation strategy daily.      Episode of Care Goals: Satisfactory progress - ACTION (Actively working towards change); Intervened by reinforcing change plan / affirming steps taken.     Current / Ongoing Stressors and Concerns:   The client reported that his primary stressor is managing the symptoms of depression and anxiety he has been experiencing.     Treatment Objective(s) Addressed in This Session:   The client will use relaxation strategies two times per day to reduce the physical symptoms of anxiety. The client will use \"worry time\" each day for thirty minutes of scheduled worry and then defer obsessive or anxious thinking until the next structured \"worry time\".     Intervention:   Discussed and reinforced the client's efforts to use the relaxation strategy daily. Examined the client's recent worries. Taught the client the concept of \"worry time\". Obtained a commitment from the client to use \"worry time\" daily.        ASSESSMENT: Current Emotional / Mental Status (status of significant symptoms):   Risk status (Self / Other harm or suicidal ideation)   Client denies current fears or concerns for personal safety.   Client denies current or recent suicidal ideation or behaviors.   Client denies current or recent homicidal ideation or " "behaviors.   Client denies current or recent self injurious behavior or ideation.   Client denies other safety concerns.   Client reports there has been no change in risk factors since his last session.     Client reports there has been no change in protective factors since his last session.     Recommended that the client call 911 or go to the local emergency department should there be a change in any of these risk factors.     Appearance:   Appropriate    Eye Contact:   Good    Psychomotor Behavior: Normal    Attitude:   Cooperative    Orientation:   All   Speech    Rate / Production: Normal     Volume:  Normal    Mood:    Anxious    Affect:    Constricted    Thought Content:  Clear    Thought Form:  Coherent  Logical    Insight:    Good      Medication Review:   No changes to current psychiatric medication(s).     Medication Compliance:   Yes     Changes in Health Issues:   None reported.     Chemical Use Review:   Substance Use: Chemical use reviewed. Will continue to be assessed.      Tobacco Use: No current tobacco use.      Diagnosis:  1. Generalized anxiety disorder    2. Major depressive disorder, recurrent episode, severe (H)        Collateral Reports Completed:   Not Applicable    PLAN: (Patient Tasks / Therapist Tasks / Other)  Use \"worry time\" daily.      Leticia Goodman, M.S.W., L.JD.C.S.W.                                                         ______________________________________________________________________    Treatment Plan    Patient's Name: Iker Edge  YOB: 2000    Date: 11/7/2019    DSM5 Diagnoses:       296.33 (F33.2) Major Depressive Disorder, Recurrent Episode, Severe    300.02 (F41.1) Generalized Anxiety Disorder    Psychosocial / Contextual Factors: The client is a [age] year old, , single male. The client is employed full-time as a  at [employer] in [place], [place]. The client reported that he is attending individual therapy at this time because " "he has been experiencing symptoms of both depression and anxiety.    WHODAS: 28    Referral / Collaboration:  Referral to another professional/service is not indicated at this time.    Anticipated number of session or this episode of care: 12    MeasurableTreatment Goal(s) related to diagnosis / functional impairment(s)  Goal 1: The client will learn and apply skills to manage the symptoms of depression he has been experiencing.    I will know I've met my goal when I don't feel so down and depressed.      Objective #A   The client will decrease his frequency and intensity of feeling down, depressed and hopeless.  Status: New - Date: 11/7/2019     Intervention(s)  Therapist will assist and support the client in decreasing his frequency and intensity of feeling down, depressed and hopeless.    Objective #B  The client will improve his concentration, focus, and ability to be mindful in daily activities.   Status: New - Date: 11/7/2019     Intervention(s)  Therapist will assist and support the client in improving his concentration, focus, and ability to be mindful in daily activities.    Objective #C  The client will decrease his frequency and intensity of suicidal ideation.  Status: New - Date: 11/7/2019     Intervention(s)  Therapist will assist and support the client in decreasing his frequency and intensity of suicidal ideation.      Goal 2: The client will learn and apply skills to manage the symptoms of anxiety he has been experiencing.    I will know I've met my goal when I don't worry so much.       Objective #A  The client will use \"worry time\" each day for thirty minutes of scheduled worry and then defer obsessive or anxious thinking until the next structured \"worry time\".    Status: New - Date: 11/7/2019     Intervention(s)  Therapist will teach and support the client in learning and using \"worry time\".    Objective #B  The client will use relaxation strategies two times per day to reduce the physical " symptoms of anxiety.  Status: New - Date: 11/7/2019     Intervention(s)  Therapist will teach and support the client in learning and using relaxation strategies.    Objective #C  The client will use cognitive strategies identified in therapy to challenge anxious thoughts.    Status: New - Date: 11/7/2019     Intervention(s)  Therapist will teach and support the client in learning and using cognitive strategies to challenge anxious thoughts.     Objective #D  The client will learn how to use his senses to ground himself in his body.    Status: New - Date: 11/7/2019     Intervention(s)  Therapist will  and support the client in learning how to use his senses to ground himself in his body.    Objective #E  The client will be mindful of the body sensations he is experiencing when he is feeling anxious.    Status: New - Date: 11/7/2019     Intervention(s)  Therapist will  and support the client in being mindful of the body sensations he is experiencing when he is feeling anxious.      The client has reviewed and agreed to the above plan.      Leticia Goodman, M.S.W., L.I.C.S.W.  November 8, 2019

## 2020-01-30 ENCOUNTER — OFFICE VISIT (OUTPATIENT)
Dept: PSYCHOLOGY | Facility: CLINIC | Age: 20
End: 2020-01-30
Payer: COMMERCIAL

## 2020-01-30 DIAGNOSIS — F33.2 MAJOR DEPRESSIVE DISORDER, RECURRENT EPISODE, SEVERE (H): ICD-10-CM

## 2020-01-30 DIAGNOSIS — F41.1 GENERALIZED ANXIETY DISORDER: Primary | ICD-10-CM

## 2020-01-30 PROCEDURE — 90834 PSYTX W PT 45 MINUTES: CPT | Performed by: SOCIAL WORKER

## 2020-02-01 NOTE — PROGRESS NOTES
"                                           Progress Note    Patient Name: Iker Edge  Date: 1/30/2020         Service Type: Individual  Video Visit: No     Session Start Time: 8:30 AM  Session End Time: 9:20 AM     Session Length: 50 Minutes    Session #: 6    Attendees: Client     Treatment Plan Last Reviewed: 11/7/2019  PHQ-9 / LISA-7 : 13/12    DATA  Interactive Complexity: No  Crisis: No       Progress Since Last Session (Related to Symptoms / Goals / Homework):   Symptoms: Improving : The client reported feeling less anxious since the previous individual therapy session.    Homework: Partially completed. The client reported that he has been using \"worry time\" as needed.      Episode of Care Goals: Satisfactory progress - ACTION (Actively working towards change); Intervened by reinforcing change plan / affirming steps taken.     Current / Ongoing Stressors and Concerns:   The client reported that his primary stressor is managing the symptoms of depression and anxiety he has been experiencing.     Treatment Objective(s) Addressed in This Session:   The client will use \"worry time\" each day for thirty minutes of scheduled worry and then defer obsessive or anxious thinking until the next structured \"worry time\".      Intervention:   Discussed and reinforced the client's efforts to use \"worry time\" as needed. Obtained a commitment from the client to continue to use \"worry time\" as needed. Talked at length about the client's plans to move in with his boyfriend in March or April.        ASSESSMENT: Current Emotional / Mental Status (status of significant symptoms):   Risk status (Self / Other harm or suicidal ideation)   Client denies current fears or concerns for personal safety.   Client denies current or recent suicidal ideation or behaviors.   Client denies current or recent homicidal ideation or behaviors.   Client denies current or recent self injurious behavior or ideation.   Client denies other safety " "concerns.   Client reports there has been no change in risk factors since his last session.     Client reports there has been no change in protective factors since his last session.     Recommended that the client call 911 or go to the local emergency department should there be a change in any of these risk factors.     Appearance:   Appropriate    Eye Contact:   Good    Psychomotor Behavior: Normal    Attitude:   Cooperative    Orientation:   All   Speech    Rate / Production: Normal     Volume:  Normal    Mood:    Mildly Anxious    Affect:    Appropriate    Thought Content:  Clear    Thought Form:  Coherent  Logical    Insight:    Good      Medication Review:   No changes to current psychiatric medication(s).     Medication Compliance:   Yes     Changes in Health Issues:   None reported.     Chemical Use Review:   Substance Use: Chemical use reviewed. Will continue to be assessed.      Tobacco Use: No current tobacco use.      Diagnosis:  1. Generalized anxiety disorder    2. Major depressive disorder, recurrent episode, severe (H)        Collateral Reports Completed:   Not Applicable    PLAN: (Patient Tasks / Therapist Tasks / Other)  Continue to use \"worry time\" as needed.      Leticia Goodman M.S.W., LMARGARET.S.W.                                                         ______________________________________________________________________    Treatment Plan    Patient's Name: Iker Edge  YOB: 2000    Date: 11/7/2019    DSM5 Diagnoses:       296.33 (F33.2) Major Depressive Disorder, Recurrent Episode, Severe    300.02 (F41.1) Generalized Anxiety Disorder    Psychosocial / Contextual Factors: The client is a [age] year old, , single male. The client is employed full-time as a  at [employer] in [place], [place]. The client reported that he is attending individual therapy at this time because he has been experiencing symptoms of both depression and anxiety.    WHODAS: " "28    Referral / Collaboration:  Referral to another professional/service is not indicated at this time.    Anticipated number of session or this episode of care: 12    MeasurableTreatment Goal(s) related to diagnosis / functional impairment(s)  Goal 1: The client will learn and apply skills to manage the symptoms of depression he has been experiencing.    I will know I've met my goal when I don't feel so down and depressed.      Objective #A   The client will decrease his frequency and intensity of feeling down, depressed and hopeless.  Status: New - Date: 11/7/2019     Intervention(s)  Therapist will assist and support the client in decreasing his frequency and intensity of feeling down, depressed and hopeless.    Objective #B  The client will improve his concentration, focus, and ability to be mindful in daily activities.   Status: New - Date: 11/7/2019     Intervention(s)  Therapist will assist and support the client in improving his concentration, focus, and ability to be mindful in daily activities.    Objective #C  The client will decrease his frequency and intensity of suicidal ideation.  Status: New - Date: 11/7/2019     Intervention(s)  Therapist will assist and support the client in decreasing his frequency and intensity of suicidal ideation.      Goal 2: The client will learn and apply skills to manage the symptoms of anxiety he has been experiencing.    I will know I've met my goal when I don't worry so much.       Objective #A  The client will use \"worry time\" each day for thirty minutes of scheduled worry and then defer obsessive or anxious thinking until the next structured \"worry time\".    Status: New - Date: 11/7/2019     Intervention(s)  Therapist will teach and support the client in learning and using \"worry time\".    Objective #B  The client will use relaxation strategies two times per day to reduce the physical symptoms of anxiety.  Status: New - Date: 11/7/2019     Intervention(s)  Therapist " will teach and support the client in learning and using relaxation strategies.    Objective #C  The client will use cognitive strategies identified in therapy to challenge anxious thoughts.    Status: New - Date: 11/7/2019     Intervention(s)  Therapist will teach and support the client in learning and using cognitive strategies to challenge anxious thoughts.     Objective #D  The client will learn how to use his senses to ground himself in his body.    Status: New - Date: 11/7/2019     Intervention(s)  Therapist will  and support the client in learning how to use his senses to ground himself in his body.    Objective #E  The client will be mindful of the body sensations he is experiencing when he is feeling anxious.    Status: New - Date: 11/7/2019     Intervention(s)  Therapist will  and support the client in being mindful of the body sensations he is experiencing when he is feeling anxious.      The client has reviewed and agreed to the above plan.      Leticia Goodman, M.S.W., L.I.C.S.W.  November 8, 2019

## 2020-02-13 ENCOUNTER — OFFICE VISIT (OUTPATIENT)
Dept: PSYCHOLOGY | Facility: CLINIC | Age: 20
End: 2020-02-13
Payer: COMMERCIAL

## 2020-02-13 DIAGNOSIS — F41.1 GENERALIZED ANXIETY DISORDER: Primary | ICD-10-CM

## 2020-02-13 DIAGNOSIS — F33.2 MAJOR DEPRESSIVE DISORDER, RECURRENT EPISODE, SEVERE (H): ICD-10-CM

## 2020-02-13 PROCEDURE — 90834 PSYTX W PT 45 MINUTES: CPT | Performed by: SOCIAL WORKER

## 2020-02-13 NOTE — PROGRESS NOTES
"                                           Progress Note    Patient Name: Iker Edge  Date: 2/13/2020         Service Type: Individual  Video Visit: No     Session Start Time: 8:30 AM  Session End Time: 9:20 AM     Session Length: 50 Minutes    Session #: 7    Attendees: Client     Treatment Plan Last Reviewed: 2/13/2020  PHQ-9 / LISA-7 : 13/12    DATA  Interactive Complexity: No  Crisis: No       Progress Since Last Session (Related to Symptoms / Goals / Homework):   Symptoms: No change : The client reported no change in his symptoms since the previous individual therapy session.    Homework: Achieved / completed to satisfaction. The client reported that he has been using \"worry time\" as needed.      Episode of Care Goals: Satisfactory progress - ACTION (Actively working towards change); Intervened by reinforcing change plan / affirming steps taken.     Current / Ongoing Stressors and Concerns:   The client reported that his primary stressor is managing the symptoms of depression and anxiety he has been experiencing.     Treatment Objective(s) Addressed in This Session:   The client will use \"worry time\" each day for thirty minutes of scheduled worry and then defer obsessive or anxious thinking until the next structured \"worry time\". The client will use cognitive strategies identified in therapy to challenge anxious thoughts. The client will participate in the review and revision of the individual treatment plan.      Intervention:   Discussed and reinforced the client's efforts to use \"worry time\" as needed. Examined his worry thoughts about Mungu[place]'s Day. Taught and practiced using the cognitive strategy of checking the facts. Obtained a commitment from the client to continue to check the facts about Munguia's Day as needed. Reviewed and revised the individual treatment plan.        ASSESSMENT: Current Emotional / Mental Status (status of significant symptoms):   Risk status (Self / Other harm or suicidal " ideation)   Client denies current fears or concerns for personal safety.   Client denies current or recent suicidal ideation or behaviors.   Client denies current or recent homicidal ideation or behaviors.   Client denies current or recent self injurious behavior or ideation.   Client denies other safety concerns.   Client reports there has been no change in risk factors since his last session.     Client reports there has been no change in protective factors since his last session.     Recommended that the client call 911 or go to the local emergency department should there be a change in any of these risk factors.     Appearance:   Appropriate    Eye Contact:   Good    Psychomotor Behavior: Normal    Attitude:   Cooperative    Orientation:   All   Speech    Rate / Production: Normal     Volume:  Normal    Mood:    Mildly Anxious    Affect:    Appropriate    Thought Content:  Clear    Thought Form:  Coherent  Logical    Insight:    Good      Medication Review:   No changes to current psychiatric medication(s).     Medication Compliance:   Yes     Changes in Health Issues:   None reported.     Chemical Use Review:   Substance Use: Chemical use reviewed. Will continue to be assessed.      Tobacco Use: No current tobacco use.      Diagnosis:  1. Generalized anxiety disorder    2. Major depressive disorder, recurrent episode, severe (H)        Collateral Reports Completed:   Not Applicable    PLAN: (Patient Tasks / Therapist Tasks / Other)  Continue to check the facts about [place]ilda's Day as needed.      Leticia Goodman, M.S.W., L.I.C.S.W.                                                         ______________________________________________________________________    Treatment Plan    Patient's Name: Iker Edge  YOB: 2000    Date: 2/13/2020    DSM5 Diagnoses:       296.33 (F33.2) Major Depressive Disorder, Recurrent Episode, Severe    300.02 (F41.1) Generalized Anxiety Disorder    Psychosocial /  "Contextual Factors: The client is a [age] year old, , single male. The client is employed full-time as a  at [employer] in [place], [place]. The client reported that he is attending individual therapy at this time because he has been experiencing symptoms of both depression and anxiety.    WHODAS: 28    Referral / Collaboration:  Referral to another professional/service is not indicated at this time.    Anticipated number of session or this episode of care: 12    MeasurableTreatment Goal(s) related to diagnosis / functional impairment(s)  Goal 1: The client will learn and apply skills to manage the symptoms of depression he has been experiencing.    I will know I've met my goal when I don't feel so down and depressed.      Objective #A   The client will decrease his frequency and intensity of feeling down, depressed and hopeless.  Status: Continued - Date(s): 2/13/2020     Intervention(s)  Therapist will assist and support the client in decreasing his frequency and intensity of feeling down, depressed and hopeless.    Objective #B  The client will improve his concentration, focus, and ability to be mindful in daily activities.   Status: Continued - Date(s): 2/13/2020     Intervention(s)  Therapist will assist and support the client in improving his concentration, focus, and ability to be mindful in daily activities.    Objective #C  The client will decrease his frequency and intensity of suicidal ideation.  Status: Continued - Date(s): 2/13/2020     Intervention(s)  Therapist will assist and support the client in decreasing his frequency and intensity of suicidal ideation.      Goal 2: The client will learn and apply skills to manage the symptoms of anxiety he has been experiencing.    I will know I've met my goal when I don't worry so much.       Objective #A  The client will use \"worry time\" each day for thirty minutes of scheduled worry and then defer obsessive or anxious thinking until the " "next structured \"worry time\".    Status: Continued - Date(s): 2/13/2020     Intervention(s)  Therapist will teach and support the client in learning and using \"worry time\".    Objective #B  The client will use relaxation strategies two times per day to reduce the physical symptoms of anxiety.  Status: Continued - Date(s): 2/13/2020     Intervention(s)  Therapist will teach and support the client in learning and using relaxation strategies.    Objective #C  The client will use cognitive strategies identified in therapy to challenge anxious thoughts.    Status: Continued - Date(s): 2/13/2020     Intervention(s)  Therapist will teach and support the client in learning and using cognitive strategies to challenge anxious thoughts.     Objective #D  The client will learn how to use his senses to ground himself in his body.    Status: Continued - Date(s): 2/13/2020     Intervention(s)  Therapist will  and support the client in learning how to use his senses to ground himself in his body.    Objective #E  The client will be mindful of the body sensations he is experiencing when he is feeling anxious.    Status: Continued - Date(s): 2/13/2020     Intervention(s)  Therapist will  and support the client in being mindful of the body sensations he is experiencing when he is feeling anxious.      The client has reviewed and agreed to the above plan.      JAMAAL Hoffmann.S.GUERITA., L.BRIANSLuis AlbertoW.  February 13, 2020  "

## 2020-02-24 ENCOUNTER — OFFICE VISIT (OUTPATIENT)
Dept: FAMILY MEDICINE | Facility: CLINIC | Age: 20
End: 2020-02-24
Payer: COMMERCIAL

## 2020-02-24 VITALS
WEIGHT: 122 LBS | HEIGHT: 70 IN | BODY MASS INDEX: 17.47 KG/M2 | RESPIRATION RATE: 16 BRPM | OXYGEN SATURATION: 98 % | SYSTOLIC BLOOD PRESSURE: 122 MMHG | DIASTOLIC BLOOD PRESSURE: 78 MMHG | TEMPERATURE: 97.9 F | HEART RATE: 59 BPM

## 2020-02-24 DIAGNOSIS — F33.1 MODERATE RECURRENT MAJOR DEPRESSION (H): ICD-10-CM

## 2020-02-24 PROCEDURE — 99213 OFFICE O/P EST LOW 20 MIN: CPT | Performed by: FAMILY MEDICINE

## 2020-02-24 ASSESSMENT — ANXIETY QUESTIONNAIRES
5. BEING SO RESTLESS THAT IT IS HARD TO SIT STILL: NOT AT ALL
1. FEELING NERVOUS, ANXIOUS, OR ON EDGE: NEARLY EVERY DAY
6. BECOMING EASILY ANNOYED OR IRRITABLE: SEVERAL DAYS
7. FEELING AFRAID AS IF SOMETHING AWFUL MIGHT HAPPEN: SEVERAL DAYS
2. NOT BEING ABLE TO STOP OR CONTROL WORRYING: MORE THAN HALF THE DAYS
GAD7 TOTAL SCORE: 9
3. WORRYING TOO MUCH ABOUT DIFFERENT THINGS: MORE THAN HALF THE DAYS

## 2020-02-24 ASSESSMENT — MIFFLIN-ST. JEOR: SCORE: 1551.7

## 2020-02-24 ASSESSMENT — PATIENT HEALTH QUESTIONNAIRE - PHQ9
5. POOR APPETITE OR OVEREATING: NOT AT ALL
SUM OF ALL RESPONSES TO PHQ QUESTIONS 1-9: 7

## 2020-02-24 NOTE — PATIENT INSTRUCTIONS
Your moods are stable.  Continue medication at current dose.  If symptoms change/worsen, see doctor again.    You have active refills at the pharmacy.    Patient Education     Stress Relief: Changing Your Response  You are the only person responsible for your thoughts and actions. This simple idea is your most powerful tool for managing stress. Start by having realistic expectations. Then learn to recognize what you can--and can't--control. Finally, think about ways to change your response. With practice, you can learn to let go of stressful ways of thinking. You are the only person responsible for your thoughts and actions.  Have realistic expectations    When it comes to events that cause you stress, ask yourself:    What are my expectations?    How likely is it that my expectations, good or bad, will be met? Are they realistic?    If they aren't met, do I have to respond by feeling badly? How can I work with other outcomes?  Understand what you can do  To get better at managing stress, try these tips:    Put the stressor in perspective. Will being late to work really get you fired?    Be flexible and look for answers. If you re stuck in traffic and your car is stopped or you are not the , try calling to let people know you re on the way.    Plan ahead for next time. If being late is a worry, plan to leave a few minutes earlier.  Make mountains into molehills  A common cause of stress is feeling as if you have to solve all your problems at once. To shake this feeling, learn to take things one step at a time. Try to break big problems into smaller tasks that you can handle. That way, worries that seem like mountains become little hills you can climb over. Remember, taking small steps will carry you forward. If you find you can t manage your stress, or your reactions are becoming more frequent or violent, get professional help.   Date Last Reviewed: 1/1/2017 2000-2019 [employer]. [address] [place] [address]  [place] [address]. All rights reserved. This information is not intended as a substitute for professional medical care. Always follow your healthcare professional's instructions.           Patient Education     Stress Relief: Activities    When you're feeling stressed, some simple exercises can provide relief right away. These exercises are not the kind you need sweatpants for. You can do them almost anytime and anywhere. They will help you feel more relaxed.  Walking  Taking a walk is a great way to fight stress. Walking offers a chance to take a break from a stressful situation. It can also give you a few minutes to think things through. Even a short walk can help you feel better. That's because walking is a positive action that you control.  Stretching  Muscle tension is a common response to stress. Stretching is a simple way to loosen up. Try these stretches:    Neck stretch. Sit up straight and tuck in your chin. Place your left hand on the right side of your head. Gently pull your head to the left and hold for 10 seconds. Switch sides and repeat the exercise.    Shoulder and arm stretch. Put your hands together and lock your fingers. Then raise your hands above your head, palms upward. Hold for 15 seconds and relax. Repeat 3 times.  Deep breathing  Deep breathing is a simple method for relieving tension. Use 3 deep breaths each time you do this exercise.  1. Inhale. Breathe in slowly and deeply through your nose. Take in as much air as possible. Hold for 3 seconds.  2. Exhale. Breathe out slowly through your mouth. Try pursing your lips as if you were going to whistle. This helps control how fast you exhale.  Date Last Reviewed: 1/1/2017 2000-2019 [employer]. [address] [place] [address]. All rights reserved. This information is not intended as a substitute for professional medical care. Always follow your healthcare professional's instructions.           Patient  Education     Keys to Managing Stress  There are several keys to managing stress. First, learn to recognize when you re under stress and what triggers it. Next, find positive ways of responding to your triggers. Be sure to take good care of your health and make time to relax. Read on to learn more about the keys to managing stress.    Recognizing stress  Learn to recognize your stress and find out what triggers it. To do this, try to be aware of how you feel each day. If you notice your heart racing or your muscles tightening, your body may be responding to stress. Ask yourself why. Then write down your answer. To keep the process going, make a list of all the things that trigger stressful feelings.  Living a healthy life  Keeping yourself healthy helps you deal better with stress. This means getting enough sleep, eating right, and exercising. It also means knowing what you value most in life, and making time for yourself. Keep a daily health journal to see if you re doing these things. Then, read your journal each week. If you don t take good care of yourself, you may feel more stressed.  Responding better to stress  Life is full of stressors that you can t control. But you can learn more positive ways of responding to them. This will help you feel more in control. To begin, try this tip: Think about how much effort you want to put into dealing with a certain stressor. Do you really need to handle that stressor? If so, decide on the best way to do this. Change what you can. But if the stressor isn t important, or if it s out of your control, then why worry about it?  Relaxing to slow down  Relaxing can help you prevent or relieve stressful feelings. This tip may also help: When you re facing a stressor, pause for a moment. Then take a deep breath and slowly breathe out as you count to 10. This will help clear your mind so you can respond to stress better.  Date Last Reviewed: 1/1/2017 2000-2019 [employer]. [address] [place] [address], [place] [address]. All rights reserved. This information is not intended as a substitute for professional medical care. Always follow your healthcare professional's instructions.           Patient Education     Manage Stress with a Healthy Lifestyle    Managing stress is easier if you take good care of yourself. Make time for rest and recreation. Eat healthier meals. Take a walk now and then. And don t forget to treat yourself. A little down time can go a long way.  Get enough rest  When you don t get enough sleep, you may be too tired to cope with stress. Also, stress can prevent you from sleeping well or may keep you awake. If this happens to you, try doing relaxing activities before bedtime. Consider meditation, deep breathing, or listening to soothing music before you go to sleep.  Make time for yourself  In today s world, there is often too much to do in too little time. It may seem hard to make time for yourself. But try to spend just a few minutes each day doing something you enjoy. This can improve the quality of your life and your mental outlook. Also, you ll be more productive when handling your day-to-day duties. And you ll be in a better frame of mind to cope with stress.  Eat right  It s easy to react to stress by reaching for a bag of chips or a cup of coffee. This may give you a quick boost but may later drain your energy. To keep your energy level steady, eat healthy meals and snacks at home and at work. Try not to skip meals. Consider a healthy diet that's low in fat and rich in whole grains and fresh fruits and vegetables.  Nourish your spirit  When life is hectic, it s easy to forget what your values and goals are. To help prevent this from happening, find out what is most important in your life. Ask yourself,  What would I miss most if I had to start a new life alone somewhere else? My work? My family or friends? Something I love doing?  Then focus on  embracing your values and what you want to achieve in your life.  Stay on the move  Exercise helps burn off the negative energy of stress. Doing something active that you enjoy also helps you get away from stressful situations. Try to walk, jog, skate, swim, dance, take a fitness class, or play a team sport on most days. Or practice yoga or bonilla chi, which can help you relax.  Put some fun into your life  Some things you may enjoy doing may be listed below. If not, try some of these. Then add your own.    Go see a movie    Have lunch with a friend    Learn a new sport or game    Plan a fun trip    Take a class on something you always wanted to learn    Try a new hobby  Date Last Reviewed: 1/18/2017 2000-2019 [employer]. [address] [place] [address], [place] [address]. All rights reserved. This information is not intended as a substitute for professional medical care. Always follow your healthcare professional's instructions.

## 2020-02-24 NOTE — PROGRESS NOTES
Subjective     Iker Edge is a 20 year old male who presents to clinic today for the following health issues:    HPI   Depression and Anxiety Follow-Up    How are you doing with your depression since your last visit? Improved   How are you doing with your anxiety since your last visit?  No change    Are you having other symptoms that might be associated with depression or anxiety? Yes:  will be moving next week    Have you had a significant life event? OTHER: moving and Father was in the hospital     Do you have any concerns with your use of alcohol or other drugs? No     Patient is moving to his own place soon next week.    Social History     Tobacco Use     Smoking status: Never Smoker     Smokeless tobacco: Never Used     Tobacco comment: no tobacco exposure   Substance Use Topics     Alcohol use: No     Drug use: No     PHQ 10/31/2019 12/16/2019 2/24/2020   PHQ-9 Total Score 13 7 7   Q9: Thoughts of better off dead/self-harm past 2 weeks Several days Not at all Not at all   F/U: Thoughts of suicide or self-harm - - -   F/U: Safety concerns - - -     LISA-7 SCORE 10/31/2019 12/16/2019 2/24/2020   Total Score 12 10 9     Last PHQ-9 2/24/2020   1.  Little interest or pleasure in doing things 2   2.  Feeling down, depressed, or hopeless 1   3.  Trouble falling or staying asleep, or sleeping too much 1   4.  Feeling tired or having little energy 1   5.  Poor appetite or overeating 0   6.  Feeling bad about yourself 0   7.  Trouble concentrating 2   8.  Moving slowly or restless 0   Q9: Thoughts of better off dead/self-harm past 2 weeks 0   PHQ-9 Total Score 7   Difficulty at work, home, or with people -   In the past two weeks have you had thoughts of suicide or self harm? -   Do you have concerns about your personal safety or the safety of others? -     LISA-7  2/24/2020   1. Feeling nervous, anxious, or on edge 3   2. Not being able to stop or control worrying 2   3. Worrying too much about different things 2   4.  Trouble relaxing 0   5. Being so restless that it is hard to sit still 0   6. Becoming easily annoyed or irritable 1   7. Feeling afraid, as if something awful might happen 1   LISA-7 Total Score 9   If you checked any problems, how difficult have they made it for you to do your work, take care of things at home, or get along with other people? -     In the past two weeks have you had thoughts of suicide or self-harm?  No.    Do you have concerns about your personal safety or the safety of others?   No    Suicide Assessment Five-step Evaluation and Treatment (SAFE-T)      How many servings of fruits and vegetables do you eat daily?  2-3    On average, how many sweetened beverages do you drink each day (Examples: soda, juice, sweet tea, etc.  Do NOT count diet or artificially sweetened beverages)?   0    How many days per week do you exercise enough to make your heart beat faster? 3 or less    How many minutes a day do you exercise enough to make your heart beat faster? 9 or less    How many days per week do you miss taking your medication? 0    Patient reports his meds are helping him well with depression specially.  CBT helping with managing anxiety reactions.    Patient Active Problem List   Diagnosis     Epidermal inclusion cyst     Allergic conjunctivitis, bilateral     Seasonal allergic rhinitis     Klippel Trenaunay syndrome     Venous (peripheral) insufficiency     Varicose veins of left leg with edema     Anxiety and depression     Moderate recurrent major depression (H)     Past Surgical History:   Procedure Laterality Date     SURGICAL HISTORY OF -       bilateral myringotomy tubes       Social History     Tobacco Use     Smoking status: Never Smoker     Smokeless tobacco: Never Used     Tobacco comment: no tobacco exposure   Substance Use Topics     Alcohol use: No     Family History   Problem Relation Age of Onset     Thyroid Disease Mother         has 1/2 thyroid     Gastrointestinal Disease Mother        "  ulcerative colitis     Depression Mother      Anxiety Disorder Mother      Eye Disorder Paternal Grandmother         cataracts     Anemia Paternal Grandmother      Hypertension Father      Depression Father      Substance Abuse Father      Hernia Father          Current Outpatient Medications   Medication Sig Dispense Refill     sertraline (ZOLOFT) 50 MG tablet Take 1 tablet (50 mg) by mouth daily 90 tablet 1     fluticasone (FLONASE) 50 MCG/ACT spray Spray 2 sprays into both nostrils daily (Patient not taking: Reported on 10/24/2018) 16 g 6     olopatadine HCl (PATADAY) 0.2 % SOLN Place 1 drop into both eyes daily (Patient not taking: Reported on 10/24/2018) 1 Bottle 6     order for DME Equipment being ordered: Compression stockings - upper thigh-high - 20-30 (maximum compression) - one pair (Patient not taking: Reported on 2/24/2020) 1 each 1     No Known Allergies      Reviewed and updated as needed this visit by Provider  Tobacco  Allergies  Meds  Problems  Med Hx  Surg Hx  Fam Hx         Review of Systems   C: NEGATIVE for fever, chills, change in weight  I: NEGATIVE for worrisome rashes, moles or lesions  E: NEGATIVE for vision changes or irritation  E/M: NEGATIVE for ear, mouth and throat problems  R: NEGATIVE for significant cough or SOB  CV: NEGATIVE for chest pain, palpitations or peripheral edema  GI: NEGATIVE for nausea, abdominal pain, heartburn, or change in bowel habits  : NEGATIVE for frequency, dysuria, or hematuria  N: NEGATIVE for weakness, dizziness or paresthesias  E: NEGATIVE for temperature intolerance, skin/hair changes  PSYCHIATRIC: see above      Objective    /78   Pulse 59   Temp 97.9  F (36.6  C) (Tympanic)   Resp 16   Ht 1.765 m (5' 9.5\")   Wt 55.3 kg (122 lb)   SpO2 98%   BMI 17.76 kg/m    Body mass index is 17.76 kg/m .  Physical Exam   GENERAL: alert and no distress  EYES: no icterus, PERRLA  SKIN: no jaundice/rash  NEURO: no tremors  PSYCH: well-kempt, linear " thought process, normal speech, good insight/judgement, normal mood, appropriate affect, no suicidality, no aggression, no hallucination    Diagnostic Test Results:  none         Assessment & Plan     Iker was seen today for recheck medication.    Diagnoses and all orders for this visit:    Moderate recurrent major depression (H)      Stable mood.  Continue current med dose.  Suicidal precautions reinforced.    Reviewed med list. Refills up to date. Patient said he had not picked up Rx from December 2019 yet.    Patient Instructions     Your moods are stable.  Continue medication at current dose.  If symptoms change/worsen, see doctor again.    You have active refills at the pharmacy.    Patient Education     Stress Relief: Changing Your Response  You are the only person responsible for your thoughts and actions. This simple idea is your most powerful tool for managing stress. Start by having realistic expectations. Then learn to recognize what you can--and can't--control. Finally, think about ways to change your response. With practice, you can learn to let go of stressful ways of thinking. You are the only person responsible for your thoughts and actions.  Have realistic expectations    When it comes to events that cause you stress, ask yourself:    What are my expectations?    How likely is it that my expectations, good or bad, will be met? Are they realistic?    If they aren't met, do I have to respond by feeling badly? How can I work with other outcomes?  Understand what you can do  To get better at managing stress, try these tips:    Put the stressor in perspective. Will being late to work really get you fired?    Be flexible and look for answers. If you re stuck in traffic and your car is stopped or you are not the , try calling to let people know you re on the way.    Plan ahead for next time. If being late is a worry, plan to leave a few minutes earlier.  Make mountains into molehills  A common cause  of stress is feeling as if you have to solve all your problems at once. To shake this feeling, learn to take things one step at a time. Try to break big problems into smaller tasks that you can handle. That way, worries that seem like mountains become little hills you can climb over. Remember, taking small steps will carry you forward. If you find you can t manage your stress, or your reactions are becoming more frequent or violent, get professional help.   Date Last Reviewed: 1/1/2017 2000-2019 "[employer]. [address] [place] [id number]. All rights reserved. This information is not intended as a substitute for professional medical care. Always follow your healthcare professional's instructions.           Patient Education     Stress Relief: Activities    When you're feeling stressed, some simple exercises can provide relief right away. These exercises are not the kind you need sweatpants for. You can do them almost anytime and anywhere. They will help you feel more relaxed.  Walking  Taking a walk is a great way to fight stress. Walking offers a chance to take a break from a stressful situation. It can also give you a few minutes to think things through. Even a short walk can help you feel better. That's because walking is a positive action that you control.  Stretching  Muscle tension is a common response to stress. Stretching is a simple way to loosen up. Try these stretches:    Neck stretch. Sit up straight and tuck in your chin. Place your left hand on the right side of your head. Gently pull your head to the left and hold for 10 seconds. Switch sides and repeat the exercise.    Shoulder and arm stretch. Put your hands together and lock your fingers. Then raise your hands above your head, palms upward. Hold for 15 seconds and relax. Repeat 3 times.  Deep breathing  Deep breathing is a simple method for relieving tension. Use 3 deep breaths each time you do this  exercise.  2. Inhale. Breathe in slowly and deeply through your nose. Take in as much air as possible. Hold for 3 seconds.  3. Exhale. Breathe out slowly through your mouth. Try pursing your lips as if you were going to whistle. This helps control how fast you exhale.  Date Last Reviewed: 1/1/2017 2000-2019 [employer]. [address] [place] [address], [place] [address]. All rights reserved. This information is not intended as a substitute for professional medical care. Always follow your healthcare professional's instructions.           Patient Education     Keys to Managing Stress  There are several keys to managing stress. First, learn to recognize when you re under stress and what triggers it. Next, find positive ways of responding to your triggers. Be sure to take good care of your health and make time to relax. Read on to learn more about the keys to managing stress.    Recognizing stress  Learn to recognize your stress and find out what triggers it. To do this, try to be aware of how you feel each day. If you notice your heart racing or your muscles tightening, your body may be responding to stress. Ask yourself why. Then write down your answer. To keep the process going, make a list of all the things that trigger stressful feelings.  Living a healthy life  Keeping yourself healthy helps you deal better with stress. This means getting enough sleep, eating right, and exercising. It also means knowing what you value most in life, and making time for yourself. Keep a daily health journal to see if you re doing these things. Then, read your journal each week. If you don t take good care of yourself, you may feel more stressed.  Responding better to stress  Life is full of stressors that you can t control. But you can learn more positive ways of responding to them. This will help you feel more in control. To begin, try this tip: Think about how much effort you want to put into dealing with a certain  stressor. Do you really need to handle that stressor? If so, decide on the best way to do this. Change what you can. But if the stressor isn t important, or if it s out of your control, then why worry about it?  Relaxing to slow down  Relaxing can help you prevent or relieve stressful feelings. This tip may also help: When you re facing a stressor, pause for a moment. Then take a deep breath and slowly breathe out as you count to 10. This will help clear your mind so you can respond to stress better.  Date Last Reviewed: 1/1/2017 2000-2019 [employer]. [address] [place] [address], [place] [address]. All rights reserved. This information is not intended as a substitute for professional medical care. Always follow your healthcare professional's instructions.           Patient Education     Manage Stress with a Healthy Lifestyle    Managing stress is easier if you take good care of yourself. Make time for rest and recreation. Eat healthier meals. Take a walk now and then. And don t forget to treat yourself. A little down time can go a long way.  Get enough rest  When you don t get enough sleep, you may be too tired to cope with stress. Also, stress can prevent you from sleeping well or may keep you awake. If this happens to you, try doing relaxing activities before bedtime. Consider meditation, deep breathing, or listening to soothing music before you go to sleep.  Make time for yourself  In today s world, there is often too much to do in too little time. It may seem hard to make time for yourself. But try to spend just a few minutes each day doing something you enjoy. This can improve the quality of your life and your mental outlook. Also, you ll be more productive when handling your day-to-day duties. And you ll be in a better frame of mind to cope with stress.  Eat right  It s easy to react to stress by reaching for a bag of chips or a cup of coffee. This may give you a quick boost but may later drain  your energy. To keep your energy level steady, eat healthy meals and snacks at home and at work. Try not to skip meals. Consider a healthy diet that's low in fat and rich in whole grains and fresh fruits and vegetables.  Nourish your spirit  When life is hectic, it s easy to forget what your values and goals are. To help prevent this from happening, find out what is most important in your life. Ask yourself,  What would I miss most if I had to start a new life alone somewhere else? My work? My family or friends? Something I love doing?  Then focus on embracing your values and what you want to achieve in your life.  Stay on the move  Exercise helps burn off the negative energy of stress. Doing something active that you enjoy also helps you get away from stressful situations. Try to walk, jog, skate, swim, dance, take a fitness class, or play a team sport on most days. Or practice yoga or bonilla chi, which can help you relax.  Put some fun into your life  Some things you may enjoy doing may be listed below. If not, try some of these. Then add your own.    Go see a movie    Have lunch with a friend    Learn a new sport or game    Plan a fun trip    Take a class on something you always wanted to learn    Try a new hobby  Date Last Reviewed: 1/18/2017 2000-2019 [employer]. [address] [place] [address], [place] [id number]. All rights reserved. This information is not intended as a substitute for professional medical care. Always follow your healthcare professional's instructions.               Return in about 6 months (around 8/24/2020) for anxiety follow up..    Paul Pacheco MD  [facility]         negative...

## 2020-02-24 NOTE — NURSING NOTE
"Initial /78   Pulse 59   Resp 16   Ht 1.765 m (5' 9.5\")   Wt 55.3 kg (122 lb)   SpO2 98%   BMI 17.76 kg/m   Estimated body mass index is 17.76 kg/m  as calculated from the following:    Height as of this encounter: 1.765 m (5' 9.5\").    Weight as of this encounter: 55.3 kg (122 lb). .    Mary Grace Masters, YENNIFER    "

## 2020-02-25 ASSESSMENT — ANXIETY QUESTIONNAIRES: GAD7 TOTAL SCORE: 9

## 2020-02-27 ENCOUNTER — OFFICE VISIT (OUTPATIENT)
Dept: PSYCHOLOGY | Facility: CLINIC | Age: 20
End: 2020-02-27
Payer: COMMERCIAL

## 2020-02-27 DIAGNOSIS — F41.1 GENERALIZED ANXIETY DISORDER: Primary | ICD-10-CM

## 2020-02-27 DIAGNOSIS — F33.2 MAJOR DEPRESSIVE DISORDER, RECURRENT EPISODE, SEVERE (H): ICD-10-CM

## 2020-02-27 PROCEDURE — 90834 PSYTX W PT 45 MINUTES: CPT | Performed by: SOCIAL WORKER

## 2020-02-28 NOTE — PROGRESS NOTES
"                                           Progress Note    Patient Name: Iker Edge  Date: 2/27/2020         Service Type: Individual  Video Visit: No     Session Start Time: 9:30 AM  Session End Time: 10:20 AM     Session Length: 50 Minutes    Session #: 8    Attendees: Client     Treatment Plan Last Reviewed: 2/13/2020  PHQ-9 / LISA-7 : 13/12    DATA  Interactive Complexity: No  Crisis: No       Progress Since Last Session (Related to Symptoms / Goals / Homework):   Symptoms: Worsening : The client reported experiencing slightly increased anxiety since signing a lease to move in with his boyfriend.    Homework: Achieved / completed to satisfaction. The client reported that he did continue to check the facts about Munguia's Day.      Episode of Care Goals: No improvement - ACTION (Actively working towards change); Intervened by reinforcing change plan / affirming steps taken.     Current / Ongoing Stressors and Concerns:   The client reported that his primary stressor is managing the symptoms of depression and anxiety he has been experiencing.     Treatment Objective(s) Addressed in This Session:   The client will use cognitive strategies identified in therapy to challenge anxious thoughts. The client will use \"worry time\" each day for thirty minutes of scheduled worry and then defer obsessive or anxious thinking until the next structured \"worry time\".     Intervention:   Discussed and reinforced the client's efforts to continue to check the facts about Munguia's Day. Talked at length about the client's plan to move into an apartment with his boyfriend in a week. Examined the client's worry thoughts related to moving in with his boyfriend. Obtained a commitment from the client to use \"worry time\" daily.        ASSESSMENT: Current Emotional / Mental Status (status of significant symptoms):   Risk status (Self / Other harm or suicidal ideation)   Client denies current fears or concerns for personal " "safety.   Client denies current or recent suicidal ideation or behaviors.   Client denies current or recent homicidal ideation or behaviors.   Client denies current or recent self injurious behavior or ideation.   Client denies other safety concerns.   Client reports there has been no change in risk factors since his last session.     Client reports there has been no change in protective factors since his last session.     Recommended that the client call 911 or go to the local emergency department should there be a change in any of these risk factors.     Appearance:   Appropriate    Eye Contact:   Good    Psychomotor Behavior: Normal    Attitude:   Cooperative    Orientation:   All   Speech    Rate / Production: Normal     Volume:  Normal    Mood:    Anxious    Affect:    Appropriate    Thought Content:  Clear    Thought Form:  Coherent  Logical    Insight:    Good      Medication Review:   No changes to current psychiatric medication(s).     Medication Compliance:   Yes     Changes in Health Issues:   None reported.     Chemical Use Review:   Substance Use: Chemical use reviewed. Will continue to be assessed.      Tobacco Use: No current tobacco use.      Diagnosis:  1. Generalized anxiety disorder    2. Major depressive disorder, recurrent episode, severe (H)        Collateral Reports Completed:   Not Applicable    PLAN: (Patient Tasks / Therapist Tasks / Other)  Use \"worry time\" daily.      Leticia Goodman, M.S.W., [email or web address].                                                         ______________________________________________________________________    Treatment Plan    Patient's Name: Iker Edge  YOB: 2000    Date: 2/13/2020    DSM5 Diagnoses:       296.33 (F33.2) Major Depressive Disorder, Recurrent Episode, Severe    300.02 (F41.1) Generalized Anxiety Disorder    Psychosocial / Contextual Factors: The client is a [age] year old, , single male. The client is employed full-time " "as a  at [employer] in [place], [place]. The client reported that he is attending individual therapy at this time because he has been experiencing symptoms of both depression and anxiety.    WHODAS: 28    Referral / Collaboration:  Referral to another professional/service is not indicated at this time.    Anticipated number of session or this episode of care: 12    MeasurableTreatment Goal(s) related to diagnosis / functional impairment(s)  Goal 1: The client will learn and apply skills to manage the symptoms of depression he has been experiencing.    I will know I've met my goal when I don't feel so down and depressed.      Objective #A   The client will decrease his frequency and intensity of feeling down, depressed and hopeless.  Status: Continued - Date(s): 2/13/2020     Intervention(s)  Therapist will assist and support the client in decreasing his frequency and intensity of feeling down, depressed and hopeless.    Objective #B  The client will improve his concentration, focus, and ability to be mindful in daily activities.   Status: Continued - Date(s): 2/13/2020     Intervention(s)  Therapist will assist and support the client in improving his concentration, focus, and ability to be mindful in daily activities.    Objective #C  The client will decrease his frequency and intensity of suicidal ideation.  Status: Continued - Date(s): 2/13/2020     Intervention(s)  Therapist will assist and support the client in decreasing his frequency and intensity of suicidal ideation.      Goal 2: The client will learn and apply skills to manage the symptoms of anxiety he has been experiencing.    I will know I've met my goal when I don't worry so much.       Objective #A  The client will use \"worry time\" each day for thirty minutes of scheduled worry and then defer obsessive or anxious thinking until the next structured \"worry time\".    Status: Continued - Date(s): 2/13/2020     Intervention(s)  Therapist will teach " "and support the client in learning and using \"worry time\".    Objective #B  The client will use relaxation strategies two times per day to reduce the physical symptoms of anxiety.  Status: Continued - Date(s): 2/13/2020     Intervention(s)  Therapist will teach and support the client in learning and using relaxation strategies.    Objective #C  The client will use cognitive strategies identified in therapy to challenge anxious thoughts.    Status: Continued - Date(s): 2/13/2020     Intervention(s)  Therapist will teach and support the client in learning and using cognitive strategies to challenge anxious thoughts.     Objective #D  The client will learn how to use his senses to ground himself in his body.    Status: Continued - Date(s): 2/13/2020     Intervention(s)  Therapist will  and support the client in learning how to use his senses to ground himself in his body.    Objective #E  The client will be mindful of the body sensations he is experiencing when he is feeling anxious.    Status: Continued - Date(s): 2/13/2020     Intervention(s)  Therapist will  and support the client in being mindful of the body sensations he is experiencing when he is feeling anxious.      The client has reviewed and agreed to the above plan.      Leticia Goodman M.S.W., L.I.C.S.W.  February 13, 2020  "

## 2020-03-31 ENCOUNTER — VIRTUAL VISIT (OUTPATIENT)
Dept: PSYCHOLOGY | Facility: CLINIC | Age: 20
End: 2020-03-31
Payer: COMMERCIAL

## 2020-03-31 DIAGNOSIS — F33.2 MAJOR DEPRESSIVE DISORDER, RECURRENT EPISODE, SEVERE (H): ICD-10-CM

## 2020-03-31 DIAGNOSIS — F41.1 GENERALIZED ANXIETY DISORDER: Primary | ICD-10-CM

## 2020-03-31 PROCEDURE — 90834 PSYTX W PT 45 MINUTES: CPT | Mod: TEL | Performed by: SOCIAL WORKER

## 2020-03-31 NOTE — PROGRESS NOTES
"                                           Progress Note    Patient Name: Iker Edge  Date: 3/31/2020         Service Type: Phone Visit  Video Visit: No     Session Start Time: 8:30 AM  Session End Time: 9:20 AM     Session Length: 50 Minutes    Session #: 9    Attendees: Client     The client has been notified of the following:    \"We have found that certain health care needs can be provided without the need for a face to face visit. This service lets us provide the care you need with a phone conversation. I will have full access to your [facility] medical record during this entire phone call.  I will be taking notes for your medical record. Since this is like an office visit, we will bill your insurance company for this service. There are potential benefits and risks of telephone visits (e.g. limits to patient confidentiality) that differ from in-person visits. Confidentiality still applies for telephone services, and nobody will record the visit. It is important to be in a quiet, private space that is free from distractions (including cell phone or other devices) during the visit. If during the course of the call, I believe a telephone visit is not appropriate, you will not be charged for this service\".    Consent has been obtained for this service by the provider: Yes    Treatment Plan Last Reviewed: 2/13/2020  PHQ-9 / LISA-7 : 13/12    DATA  Interactive Complexity: No  Crisis: No       Progress Since Last Session (Related to Symptoms / Goals / Homework):   Symptoms: Improving : The client reported experiencing decreased anxiety since the previous individual therapy session.    Homework: Achieved / completed to satisfaction. The client reported that he has been using \"worry time\" daily.      Episode of Care Goals: Satisfactory progress - ACTION (Actively working towards change); Intervened by reinforcing change plan / affirming steps taken.     Current / Ongoing Stressors and Concerns:   The client reported that " "his primary stressor is managing the symptoms of depression and anxiety he has been experiencing.     Treatment Objective(s) Addressed in This Session:   The client will use \"worry time\" each day for thirty minutes of scheduled worry and then defer obsessive or anxious thinking until the next structured \"worry time\".     Intervention:   Discussed and reinforced the client's efforts to use \"worry time\" daily. Obtained a commitment from the client to continue to use \"worry time\" daily. Talked at length about the client's experiences since he moved in with his boyfriend.        ASSESSMENT: Current Emotional / Mental Status (status of significant symptoms):   Risk status (Self / Other harm or suicidal ideation)   Client denies current fears or concerns for personal safety.   Client denies current or recent suicidal ideation or behaviors.   Client denies current or recent homicidal ideation or behaviors.   Client denies current or recent self injurious behavior or ideation.   Client denies other safety concerns.   Client reports there has been no change in risk factors since his last session.     Client reports there has been no change in protective factors since his last session.     Recommended that the client call 911 or go to the local emergency department should there be a change in any of these risk factors.     Attitude:   Cooperative    Orientation:   All   Speech    Rate / Production: Normal     Volume:  Normal    Mood:    Mildly Anxious   Thought Content:  Clear    Thought Form:  Coherent  Logical    Insight:    Good      Medication Review:   No changes to current psychiatric medication(s).     Medication Compliance:   Yes     Changes in Health Issues:   None reported.     Chemical Use Review:   Substance Use: Chemical use reviewed. Will continue to be assessed.      Tobacco Use: No current tobacco use.      Diagnosis:  1. Generalized anxiety disorder    2. Major depressive disorder, recurrent episode, severe " "(H)        Collateral Reports Completed:   Not Applicable    PLAN: (Patient Tasks / Therapist Tasks / Other)  Continue to use \"worry time\" daily.        I have reviewed the note as documented above. This accurately captures the substance of my conversation with the client.  CORA Hoffmann., ATIYA.                                                         ______________________________________________________________________    Treatment Plan    Patient's Name: Iker Edge  YOB: 2000    Date: 2/13/2020    DSM5 Diagnoses:       296.33 (F33.2) Major Depressive Disorder, Recurrent Episode, Severe    300.02 (F41.1) Generalized Anxiety Disorder    Psychosocial / Contextual Factors: The client is a [age] year old, , single male. The client is employed full-time as a  at [employer] in [place], [place]. The client reported that he is attending individual therapy at this time because he has been experiencing symptoms of both depression and anxiety.    WHODAS: 28    Referral / Collaboration:  Referral to another professional/service is not indicated at this time.    Anticipated number of session or this episode of care: 12    MeasurableTreatment Goal(s) related to diagnosis / functional impairment(s)  Goal 1: The client will learn and apply skills to manage the symptoms of depression he has been experiencing.    I will know I've met my goal when I don't feel so down and depressed.      Objective #A   The client will decrease his frequency and intensity of feeling down, depressed and hopeless.  Status: Continued - Date(s): 2/13/2020     Intervention(s)  Therapist will assist and support the client in decreasing his frequency and intensity of feeling down, depressed and hopeless.    Objective #B  The client will improve his concentration, focus, and ability to be mindful in daily activities.   Status: Continued - Date(s): 2/13/2020     Intervention(s)  Therapist will assist and " "support the client in improving his concentration, focus, and ability to be mindful in daily activities.    Objective #C  The client will decrease his frequency and intensity of suicidal ideation.  Status: Continued - Date(s): 2/13/2020     Intervention(s)  Therapist will assist and support the client in decreasing his frequency and intensity of suicidal ideation.      Goal 2: The client will learn and apply skills to manage the symptoms of anxiety he has been experiencing.    I will know I've met my goal when I don't worry so much.       Objective #A  The client will use \"worry time\" each day for thirty minutes of scheduled worry and then defer obsessive or anxious thinking until the next structured \"worry time\".    Status: Continued - Date(s): 2/13/2020     Intervention(s)  Therapist will teach and support the client in learning and using \"worry time\".    Objective #B  The client will use relaxation strategies two times per day to reduce the physical symptoms of anxiety.  Status: Continued - Date(s): 2/13/2020     Intervention(s)  Therapist will teach and support the client in learning and using relaxation strategies.    Objective #C  The client will use cognitive strategies identified in therapy to challenge anxious thoughts.    Status: Continued - Date(s): 2/13/2020     Intervention(s)  Therapist will teach and support the client in learning and using cognitive strategies to challenge anxious thoughts.     Objective #D  The client will learn how to use his senses to ground himself in his body.    Status: Continued - Date(s): 2/13/2020     Intervention(s)  Therapist will  and support the client in learning how to use his senses to ground himself in his body.    Objective #E  The client will be mindful of the body sensations he is experiencing when he is feeling anxious.    Status: Continued - Date(s): 2/13/2020     Intervention(s)  Therapist will  and support the client in being mindful of the body " sensations he is experiencing when he is feeling anxious.      The client has reviewed and agreed to the above plan.      CORA Hoffmann., L.I.C.S.W.  February 13, 2020

## 2020-04-09 ENCOUNTER — VIRTUAL VISIT (OUTPATIENT)
Dept: FAMILY MEDICINE | Facility: CLINIC | Age: 20
End: 2020-04-09
Payer: COMMERCIAL

## 2020-04-09 DIAGNOSIS — J30.1 CHRONIC SEASONAL ALLERGIC RHINITIS DUE TO POLLEN: ICD-10-CM

## 2020-04-09 DIAGNOSIS — F33.1 MODERATE RECURRENT MAJOR DEPRESSION (H): ICD-10-CM

## 2020-04-09 DIAGNOSIS — H10.13 ALLERGIC CONJUNCTIVITIS, BILATERAL: ICD-10-CM

## 2020-04-09 PROCEDURE — 99214 OFFICE O/P EST MOD 30 MIN: CPT | Mod: TEL | Performed by: NURSE PRACTITIONER

## 2020-04-09 RX ORDER — OLOPATADINE HYDROCHLORIDE 2 MG/ML
1 SOLUTION/ DROPS OPHTHALMIC DAILY
Qty: 1 BOTTLE | Refills: 6 | Status: SHIPPED | OUTPATIENT
Start: 2020-04-09 | End: 2020-06-18

## 2020-04-09 RX ORDER — FLUTICASONE PROPIONATE 50 MCG
2 SPRAY, SUSPENSION (ML) NASAL DAILY
Qty: 16 G | Refills: 6 | Status: SHIPPED | OUTPATIENT
Start: 2020-04-09 | End: 2020-06-18

## 2020-04-09 NOTE — PATIENT INSTRUCTIONS
Thank you for choosing [facility].  You may be receiving an email and/or telephone survey request from [facility] [employer] regarding your visit today.  Please take a few minutes to respond to the survey to let us know how we are doing.      If you have questions or concerns, please contact us via [employer] or you can contact your care team at [phone].    Our Clinic hours are:  Monday 6:40 am  to 7:00 pm  Tuesday -Friday 6:40 am to 5:00 pm    The [place] outpatient lab hours are:  Monday - Friday 6:10 am to 4:45 pm  Saturdays 7:00 am to 11:00 am  Appointments are required, call [phone]    If you have clinical questions after hours or would like to schedule an appointment,  call the clinic at [phone].

## 2020-04-09 NOTE — PROGRESS NOTES
"Subjective     Iker Edge is a 20 year old male who is being evaluated via a billable telephone visit.      The patient has been notified of following:     \"This telephone visit will be conducted via a call between you and your physician/provider. We have found that certain health care needs can be provided without the need for a physical exam.  This service lets us provide the care you need with a short phone conversation.  If a prescription is necessary we can send it directly to your pharmacy.  If lab work is needed we can place an order for that and you can then stop by our lab to have the test done at a later time.    Telephone visits are billed at different rates depending on your insurance coverage. During this emergency period, for some insurers they may be billed the same as an in-person visit.  Please reach out to your insurance provider with any questions.    If during the course of the call the physician/provider feels a telephone visit is not appropriate, you will not be charged for this service.\"    Patient has given verbal consent for Telephone visit?  Yes    How would you like to obtain your AVS? Mail a copy    Iker Edge complains of   Chief Complaint   Patient presents with     Refill Request     allergy meds       ALLERGIES  Patient has no known allergies.    Medication Followup of flonase and eye drop    Taking Medication as prescribed: yes    Side Effects:  None    Medication Helping Symptoms:  Yes    Spring allergy symptoms started yesterday - itchy eyes and sneezing.          Depression Followup    How are you doing with your depression since your last visit? Improved     Are you having other symptoms that might be associated with depression? No    Have you had a significant life event?  No     Are you feeling anxious or having panic attacks?   No    Do you have any concerns with your use of alcohol or other drugs? No     States that the sertraline works well - no side effects    Social " History     Tobacco Use     Smoking status: Never Smoker     Smokeless tobacco: Never Used     Tobacco comment: no tobacco exposure   Substance Use Topics     Alcohol use: No     Drug use: No     PHQ 10/31/2019 12/16/2019 2/24/2020   PHQ-9 Total Score 13 7 7   Q9: Thoughts of better off dead/self-harm past 2 weeks Several days Not at all Not at all   F/U: Thoughts of suicide or self-harm - - -   F/U: Safety concerns - - -     LISA-7 SCORE 10/31/2019 12/16/2019 2/24/2020   Total Score 12 10 9           Suicide Assessment Five-step Evaluation and Treatment (SAFE-T)          Reviewed and updated as needed this visit by Provider  Tobacco  Allergies  Meds  Problems  Med Hx  Surg Hx  Fam Hx         Review of Systems          Objective   Reported vitals:  There were no vitals taken for this visit.   Psych: Alert and oriented times 3; coherent speech, normal   rate and volume, able to articulate logical thoughts, able   to abstract reason, no tangential thoughts, no hallucinations   or delusions             Assessment/Plan:  1. Allergic conjunctivitis, bilateral  Worsening due to Spring allergies  - olopatadine (PATADAY) 0.2 % ophthalmic solution; Place 1 drop into both eyes daily  Dispense: 1 Bottle; Refill: 6    2. Chronic seasonal allergic rhinitis due to pollen  Worsening due to Spring allergies  - fluticasone (FLONASE) 50 MCG/ACT nasal spray; Spray 2 sprays into both nostrils daily  Dispense: 16 g; Refill: 6    3. Moderate recurrent major depression (H)  Well controlled.  - sertraline (ZOLOFT) 50 MG tablet; Take 1 tablet (50 mg) by mouth daily  Dispense: 90 tablet; Refill: 1    Return in about 6 months (around 10/9/2020) for Depression/anxiety Recheck.      Phone call duration:  8 minutes    CHRISTIAN Gee CNP

## 2020-04-14 ENCOUNTER — VIRTUAL VISIT (OUTPATIENT)
Dept: PSYCHOLOGY | Facility: CLINIC | Age: 20
End: 2020-04-14
Payer: COMMERCIAL

## 2020-04-14 DIAGNOSIS — F33.2 MAJOR DEPRESSIVE DISORDER, RECURRENT EPISODE, SEVERE (H): ICD-10-CM

## 2020-04-14 DIAGNOSIS — F41.1 GENERALIZED ANXIETY DISORDER: Primary | ICD-10-CM

## 2020-04-14 PROCEDURE — 90834 PSYTX W PT 45 MINUTES: CPT | Mod: TEL | Performed by: SOCIAL WORKER

## 2020-04-15 NOTE — PROGRESS NOTES
"                                           Progress Note    Patient Name: Iker Edge  Date: 4/14/2020         Service Type: Phone Visit (The client declined a video visit. He would prefer a phone visit.)  Video Visit: No     Session Start Time: 8:45 AM  Session End Time: 9:25 AM     Session Length: 40 Minutes    Session #: 10    Attendees: Client     The client has been notified of the following:    \"We have found that certain health care needs can be provided without the need for a face to face visit. This service lets us provide the care you need with a phone conversation. I will have full access to your [facility] medical record during this entire phone call.  I will be taking notes for your medical record. Since this is like an office visit, we will bill your insurance company for this service. There are potential benefits and risks of telephone visits (e.g. limits to patient confidentiality) that differ from in-person visits. Confidentiality still applies for telephone services, and nobody will record the visit. It is important to be in a quiet, private space that is free from distractions (including cell phone or other devices) during the visit. If during the course of the call, I believe a telephone visit is not appropriate, you will not be charged for this service\".    Consent has been obtained for this service by the provider: Yes    Treatment Plan Last Reviewed: 2/13/2020  PHQ-9 / LISA-7 : 13/12    DATA  Interactive Complexity: No  Crisis: No       Progress Since Last Session (Related to Symptoms / Goals / Homework):   Symptoms: No change : The client reported no change in his symptoms since the previous phone visit.    Homework: Achieved / completed to satisfaction. The client reported that he has continued to use \"worry time\" daily.      Episode of Care Goals: No improvement - ACTION (Actively working towards change); Intervened by reinforcing change plan / affirming steps taken.     Current / Ongoing " "Stressors and Concerns:   The client reported that his primary stressor is managing the symptoms of depression and anxiety he has been experiencing.     Treatment Objective(s) Addressed in This Session:   The client will use \"worry time\" each day for thirty minutes of scheduled worry and then defer obsessive or anxious thinking until the next structured \"worry time\".     Intervention:   Discussed and reinforced the client's efforts to use \"worry time\" daily. Obtained a commitment from the client to continue to use \"worry time\" daily. Talked at length about the client's concerns about her father's health issues. Reviewed and revised the individual treatment plan.        ASSESSMENT: Current Emotional / Mental Status (status of significant symptoms):   Risk status (Self / Other harm or suicidal ideation)   Client denies current fears or concerns for personal safety.   Client denies current or recent suicidal ideation or behaviors.   Client denies current or recent homicidal ideation or behaviors.   Client denies current or recent self injurious behavior or ideation.   Client denies other safety concerns.   Client reports there has been no change in risk factors since his last session.     Client reports there has been no change in protective factors since his last session.     Recommended that the client call 911 or go to the local emergency department should there be a change in any of these risk factors.     Attitude:   Cooperative    Orientation:   All   Speech    Rate / Production: Normal     Volume:  Normal    Mood:    Mildly Anxious   Thought Content:  Clear    Thought Form:  Coherent  Logical    Insight:    Good      Medication Review:   No changes to current psychiatric medication(s).     Medication Compliance:   Yes     Changes in Health Issues:   None reported.     Chemical Use Review:   Substance Use: Chemical use reviewed. Will continue to be assessed.      Tobacco Use: No current tobacco use.  " "    Diagnosis:  1. Generalized anxiety disorder    2. Major depressive disorder, recurrent episode, severe (H)        Collateral Reports Completed:   Not Applicable    PLAN: (Patient Tasks / Therapist Tasks / Other)  Continue to use \"worry time\" daily.        I have reviewed the note as documented above. This accurately captures the substance of my conversation with the client.  CORA Hoffmann., ATIYA.                                                         ______________________________________________________________________    Treatment Plan    Patient's Name: Iker Edge  YOB: 2000    Date: 2/13/2020    DSM5 Diagnoses:       296.33 (F33.2) Major Depressive Disorder, Recurrent Episode, Severe    300.02 (F41.1) Generalized Anxiety Disorder    Psychosocial / Contextual Factors: The client is a [age] year old, , single male who resides with his partner in an apartment in [place], [place]. The client is employed full-time as a  at [employer] in [place], [place]. The client reported that he is attending individual therapy at this time because he has been experiencing symptoms of both depression and anxiety.    WHODAS: 28    Referral / Collaboration:  Referral to another professional/service is not indicated at this time.    Anticipated number of session or this episode of care: 12    MeasurableTreatment Goal(s) related to diagnosis / functional impairment(s)  Goal 1: The client will learn and apply skills to manage the symptoms of depression he has been experiencing.    I will know I've met my goal when I don't feel so down and depressed.      Objective #A   The client will decrease his frequency and intensity of feeling down, depressed and hopeless.  Status: Continued - Date(s): 2/13/2020     Intervention(s)  Therapist will assist and support the client in decreasing his frequency and intensity of feeling down, depressed and hopeless.    Objective #B  The client will " "improve his concentration, focus, and ability to be mindful in daily activities.   Status: Continued - Date(s): 2/13/2020     Intervention(s)  Therapist will assist and support the client in improving his concentration, focus, and ability to be mindful in daily activities.    Objective #C  The client will decrease his frequency and intensity of suicidal ideation.  Status: Continued - Date(s): 2/13/2020     Intervention(s)  Therapist will assist and support the client in decreasing his frequency and intensity of suicidal ideation.      Goal 2: The client will learn and apply skills to manage the symptoms of anxiety he has been experiencing.    I will know I've met my goal when I don't worry so much.       Objective #A  The client will use \"worry time\" each day for thirty minutes of scheduled worry and then defer obsessive or anxious thinking until the next structured \"worry time\".    Status: Continued - Date(s): 2/13/2020     Intervention(s)  Therapist will teach and support the client in learning and using \"worry time\".    Objective #B  The client will use relaxation strategies two times per day to reduce the physical symptoms of anxiety.  Status: Continued - Date(s): 2/13/2020     Intervention(s)  Therapist will teach and support the client in learning and using relaxation strategies.    Objective #C  The client will use cognitive strategies identified in therapy to challenge anxious thoughts.    Status: Continued - Date(s): 2/13/2020     Intervention(s)  Therapist will teach and support the client in learning and using cognitive strategies to challenge anxious thoughts.     Objective #D  The client will learn how to use his senses to ground himself in his body.    Status: Continued - Date(s): 2/13/2020     Intervention(s)  Therapist will  and support the client in learning how to use his senses to ground himself in his body.    Objective #E  The client will be mindful of the body sensations he is experiencing " when he is feeling anxious.    Status: Continued - Date(s): 2/13/2020     Intervention(s)  Therapist will  and support the client in being mindful of the body sensations he is experiencing when he is feeling anxious.      The client has reviewed and agreed to the above plan.      JAMAAL Hoffmann.S.GUERITA., LMARGARET.S.W.  February 13, 2020

## 2020-05-05 ENCOUNTER — VIRTUAL VISIT (OUTPATIENT)
Dept: PSYCHOLOGY | Facility: CLINIC | Age: 20
End: 2020-05-05
Payer: COMMERCIAL

## 2020-05-05 DIAGNOSIS — F33.2 MAJOR DEPRESSIVE DISORDER, RECURRENT EPISODE, SEVERE (H): ICD-10-CM

## 2020-05-05 DIAGNOSIS — F41.1 GENERALIZED ANXIETY DISORDER: Primary | ICD-10-CM

## 2020-05-05 PROCEDURE — 90834 PSYTX W PT 45 MINUTES: CPT | Mod: TEL | Performed by: SOCIAL WORKER

## 2020-05-05 NOTE — PROGRESS NOTES
"                                           Progress Note    Patient Name: Iker Edge  Date: 5/5/2020         Service Type: Phone Visit (The client declined a video visit. He would prefer a phone visit.)  Video Visit: No     Session Start Time: 8:30 AM  Session End Time: 9:15 AM     Session Length: 45 Minutes    Session #: 11    Attendees: Client     The client has been notified of the following:    \"We have found that certain health care needs can be provided without the need for a face to face visit. This service lets us provide the care you need with a phone conversation. I will have full access to your [facility] medical record during this entire phone call.  I will be taking notes for your medical record. Since this is like an office visit, we will bill your insurance company for this service. There are potential benefits and risks of telephone visits (e.g. limits to patient confidentiality) that differ from in-person visits. Confidentiality still applies for telephone services, and nobody will record the visit. It is important to be in a quiet, private space that is free from distractions (including cell phone or other devices) during the visit. If during the course of the call, I believe a telephone visit is not appropriate, you will not be charged for this service\".    Consent has been obtained for this service by the provider: Yes    Treatment Plan Last Reviewed: 2/13/2020  PHQ-9 / LISA-7 : 13/12    DATA  Interactive Complexity: No  Crisis: No       Progress Since Last Session (Related to Symptoms / Goals / Homework):   Symptoms: Worsening : The client reported experiencing some periods of grief since the death of his grandmother on 4/19/2020.    Homework: Achieved / completed to satisfaction. The client reported that he has continued to use \"worry time\" daily.      Episode of Care Goals: No improvement - ACTION (Actively working towards change); Intervened by reinforcing change plan / affirming steps " "taken.     Current / Ongoing Stressors and Concerns:   The client reported that his primary stressor is managing the symptoms of depression and anxiety he has been experiencing.     Treatment Objective(s) Addressed in This Session:   The client will use \"worry time\" each day for thirty minutes of scheduled worry and then defer obsessive or anxious thinking until the next structured \"worry time\".     Intervention:   Discussed and reinforced the client's efforts to use \"worry time\" daily. Obtained a commitment from the client to continue to use \"worry time\" daily. Talked at length about the death of the client's grandmother. Supported the client while he allowed himself to experience and express grief.        ASSESSMENT: Current Emotional / Mental Status (status of significant symptoms):   Risk status (Self / Other harm or suicidal ideation)   Client denies current fears or concerns for personal safety.   Client denies current or recent suicidal ideation or behaviors.   Client denies current or recent homicidal ideation or behaviors.   Client denies current or recent self injurious behavior or ideation.   Client denies other safety concerns.   Client reports there has been no change in risk factors since his last session.     Client reports there has been no change in protective factors since his last session.     Recommended that the client call 911 or go to the local emergency department should there be a change in any of these risk factors.     Attitude:   Cooperative    Orientation:   All   Speech    Rate / Production: Normal     Volume:  Normal    Mood:    Grieving   Thought Content:  Clear    Thought Form:  Coherent  Logical    Insight:    Good      Medication Review:   No changes to current psychiatric medication(s).     Medication Compliance:   Yes     Changes in Health Issues:   None reported.     Chemical Use Review:   Substance Use: Chemical use reviewed. Will continue to be assessed.      Tobacco Use: No " "current tobacco use.      Diagnosis:  1. Generalized anxiety disorder    2. Major depressive disorder, recurrent episode, severe (H)        Collateral Reports Completed:   Not Applicable    PLAN: (Patient Tasks / Therapist Tasks / Other)  Continue to use \"worry time\" daily.        I have reviewed the note as documented above. This accurately captures the substance of my conversation with the client.  CORA Hoffmann., ATIYA.                                                         ______________________________________________________________________    Treatment Plan    Patient's Name: Iker Edge  YOB: 2000    Date: 2/13/2020    DSM5 Diagnoses:       296.33 (F33.2) Major Depressive Disorder, Recurrent Episode, Severe    300.02 (F41.1) Generalized Anxiety Disorder    Psychosocial / Contextual Factors: The client is a [age] year old, , single male who resides with his partner in an apartment in [place], [place]. The client is employed full-time as a  at [employer] in [place], [place]. The client reported that he is attending individual therapy at this time because he has been experiencing symptoms of both depression and anxiety.    WHODAS: 28    Referral / Collaboration:  Referral to another professional/service is not indicated at this time.    Anticipated number of session or this episode of care: 12    MeasurableTreatment Goal(s) related to diagnosis / functional impairment(s)  Goal 1: The client will learn and apply skills to manage the symptoms of depression he has been experiencing.    I will know I've met my goal when I don't feel so down and depressed.      Objective #A   The client will decrease his frequency and intensity of feeling down, depressed and hopeless.  Status: Continued - Date(s): 2/13/2020     Intervention(s)  Therapist will assist and support the client in decreasing his frequency and intensity of feeling down, depressed and hopeless.    Objective " "#B  The client will improve his concentration, focus, and ability to be mindful in daily activities.   Status: Continued - Date(s): 2/13/2020     Intervention(s)  Therapist will assist and support the client in improving his concentration, focus, and ability to be mindful in daily activities.    Objective #C  The client will decrease his frequency and intensity of suicidal ideation.  Status: Continued - Date(s): 2/13/2020     Intervention(s)  Therapist will assist and support the client in decreasing his frequency and intensity of suicidal ideation.      Goal 2: The client will learn and apply skills to manage the symptoms of anxiety he has been experiencing.    I will know I've met my goal when I don't worry so much.       Objective #A  The client will use \"worry time\" each day for thirty minutes of scheduled worry and then defer obsessive or anxious thinking until the next structured \"worry time\".    Status: Continued - Date(s): 2/13/2020     Intervention(s)  Therapist will teach and support the client in learning and using \"worry time\".    Objective #B  The client will use relaxation strategies two times per day to reduce the physical symptoms of anxiety.  Status: Continued - Date(s): 2/13/2020     Intervention(s)  Therapist will teach and support the client in learning and using relaxation strategies.    Objective #C  The client will use cognitive strategies identified in therapy to challenge anxious thoughts.    Status: Continued - Date(s): 2/13/2020     Intervention(s)  Therapist will teach and support the client in learning and using cognitive strategies to challenge anxious thoughts.     Objective #D  The client will learn how to use his senses to ground himself in his body.    Status: Continued - Date(s): 2/13/2020     Intervention(s)  Therapist will  and support the client in learning how to use his senses to ground himself in his body.    Objective #E  The client will be mindful of the body " sensations he is experiencing when he is feeling anxious.    Status: Continued - Date(s): 2/13/2020     Intervention(s)  Therapist will  and support the client in being mindful of the body sensations he is experiencing when he is feeling anxious.      The client has reviewed and agreed to the above plan.      SUNSHINE HoffmannS.GUERITA., [email or web address].JANA.[email or web address].  February 13, 2020

## 2020-05-19 ENCOUNTER — VIRTUAL VISIT (OUTPATIENT)
Dept: PSYCHOLOGY | Facility: CLINIC | Age: 20
End: 2020-05-19
Payer: COMMERCIAL

## 2020-05-19 DIAGNOSIS — F41.1 GENERALIZED ANXIETY DISORDER: Primary | ICD-10-CM

## 2020-05-19 DIAGNOSIS — F33.2 MAJOR DEPRESSIVE DISORDER, RECURRENT EPISODE, SEVERE (H): ICD-10-CM

## 2020-05-19 PROCEDURE — 90834 PSYTX W PT 45 MINUTES: CPT | Mod: TEL | Performed by: SOCIAL WORKER

## 2020-05-20 NOTE — PROGRESS NOTES
"                                           Progress Note    Patient Name: Iker Edge  Date: 5/19/2020         Service Type: Phone Visit (The client declined a video visit. He would prefer a phone visit.)  Video Visit: No     Session Start Time: 8:30 AM  Session End Time: 9:15 AM     Session Length: 45 Minutes    Session #: 12    Attendees: Client     The client has been notified of the following:    \"We have found that certain health care needs can be provided without the need for a face to face visit. This service lets us provide the care you need with a phone conversation. I will have full access to your [facility] medical record during this entire phone call.  I will be taking notes for your medical record. Since this is like an office visit, we will bill your insurance company for this service. There are potential benefits and risks of telephone visits (e.g. limits to patient confidentiality) that differ from in-person visits. Confidentiality still applies for telephone services, and nobody will record the visit. It is important to be in a quiet, private space that is free from distractions (including cell phone or other devices) during the visit. If during the course of the call, I believe a telephone visit is not appropriate, you will not be charged for this service\".    Consent has been obtained for this service by the provider: Yes    Treatment Plan Last Reviewed: 5/19/2020  PHQ-9 / LISA-7 : 13/12    DATA  Interactive Complexity: No  Crisis: No       Progress Since Last Session (Related to Symptoms / Goals / Homework):   Symptoms: Worsening : The client reported no change in his symptoms of anxiety. He indicated that he has been experiencing decreased motivation and decreased energy since the previous phone visit.    Homework: Achieved / completed to satisfaction. The client reported that he has continued to use \"worry time\" daily.      Episode of Care Goals: No improvement - RELAPSE (Returned to unhealthy " "behavior); Intervened by reassessing readiness to change and identifying appropriate stage.  Identified reasons for relapse, successes, and change talk.     Current / Ongoing Stressors and Concerns:   The client reported that his primary stressor is managing the symptoms of depression and anxiety he has been experiencing.     Treatment Objective(s) Addressed in This Session:   The client will use \"worry time\" each day for thirty minutes of scheduled worry and then defer obsessive or anxious thinking until the next structured \"worry time\". The client will decrease his frequency and intensity of feeling down, depressed and hopeless. The client will participate in the review and revision of the individual treatment plan.     Intervention:   Discussed and reinforced the client's efforts to use \"worry time\" daily. Examined the client's symptoms of anxiety and depression. Identified how the client could increase his level of activity. Obtained a commitment from the client to garden at his boyfriend's father's home at least three times each week. Reviewed and revised the individual treatment plan.        ASSESSMENT: Current Emotional / Mental Status (status of significant symptoms):   Risk status (Self / Other harm or suicidal ideation)   Client denies current fears or concerns for personal safety.   Client denies current or recent suicidal ideation or behaviors.   Client denies current or recent homicidal ideation or behaviors.   Client denies current or recent self injurious behavior or ideation.   Client denies other safety concerns.   Client reports there has been no change in risk factors since his last session.     Client reports there has been no change in protective factors since his last session.     Recommended that the client call 911 or go to the local emergency department should there be a change in any of these risk factors.     Attitude:   Cooperative    Orientation:   All   Speech    Rate / " Production: Normal     Volume:  Normal    Mood:    Depressed    Thought Content:  Clear    Thought Form:  Coherent  Logical    Insight:    Good      Medication Review:   No changes to current psychiatric medication(s).     Medication Compliance:   Yes     Changes in Health Issues:   None reported.     Chemical Use Review:   Substance Use: Chemical use reviewed. Will continue to be assessed.      Tobacco Use: No current tobacco use.      Diagnosis:  1. Generalized anxiety disorder    2. Major depressive disorder, recurrent episode, severe (H)        Collateral Reports Completed:   Not Applicable    PLAN: (Patient Tasks / Therapist Tasks / Other)  Garden at his boyfriend's father's home at least three times each week.        I have reviewed the note as documented above. This accurately captures the substance of my conversation with the client.  Leticia Goodman, M.S.W., BENIGNOS.W.                                                         ______________________________________________________________________    Treatment Plan    Patient's Name: Iker Edge  YOB: 2000    Date: 5/19/2020    DSM5 Diagnoses:       296.33 (F33.2) Major Depressive Disorder, Recurrent Episode, Severe    300.02 (F41.1) Generalized Anxiety Disorder    Psychosocial / Contextual Factors: The client is a [age] year old, , single male who resides with his partner in an apartment in [place], [place]. The client is employed full-time as a  at [employer] in [place], [place]. The client reported that he is attending individual therapy at this time because he has been experiencing symptoms of both depression and anxiety.    WHODAS: 28    Referral / Collaboration:  Referral to another professional/service is not indicated at this time.    Anticipated number of session or this episode of care: 12    MeasurableTreatment Goal(s) related to diagnosis / functional impairment(s)  Goal 1: The client will learn and apply skills  "to manage the symptoms of depression he has been experiencing.    I will know I've met my goal when I don't feel so down and depressed.      Objective #A   The client will decrease his frequency and intensity of feeling down, depressed and hopeless.  Status: Continued - Date(s): 5/19/2020     Intervention(s)  Therapist will assist and support the client in decreasing his frequency and intensity of feeling down, depressed and hopeless.    Objective #B  The client will improve his concentration, focus, and ability to be mindful in daily activities.   Status: Continued - Date(s): 5/19/2020     Intervention(s)  Therapist will assist and support the client in improving his concentration, focus, and ability to be mindful in daily activities.    Objective #C  The client will decrease his frequency and intensity of suicidal ideation.  Status: Continued - Date(s): 5/19/2020     Intervention(s)  Therapist will assist and support the client in decreasing his frequency and intensity of suicidal ideation.      Goal 2: The client will learn and apply skills to manage the symptoms of anxiety he has been experiencing.    I will know I've met my goal when I don't worry so much.       Objective #A  The client will use \"worry time\" each day for thirty minutes of scheduled worry and then defer obsessive or anxious thinking until the next structured \"worry time\".    Status: Continued - Date(s): 5/19/2020     Intervention(s)  Therapist will teach and support the client in learning and using \"worry time\".    Objective #B  The client will use relaxation strategies two times per day to reduce the physical symptoms of anxiety.  Status: Continued - Date(s): 5/19/2020     Intervention(s)  Therapist will teach and support the client in learning and using relaxation strategies.    Objective #C  The client will use cognitive strategies identified in therapy to challenge anxious thoughts.    Status: Continued - Date(s): 5/19/2020 "     Intervention(s)  Therapist will teach and support the client in learning and using cognitive strategies to challenge anxious thoughts.     Objective #D  The client will learn how to use his senses to ground himself in his body.    Status: Continued - Date(s): 5/19/2020     Intervention(s)  Therapist will  and support the client in learning how to use his senses to ground himself in his body.    Objective #E  The client will be mindful of the body sensations he is experiencing when he is feeling anxious.    Status: Continued - Date(s): 5/19/2020     Intervention(s)  Therapist will  and support the client in being mindful of the body sensations he is experiencing when he is feeling anxious.      The client has reviewed and verbally agreed to the above plan.      Leticia Goodman M.S.W., L.I.C.S.W.  May 20, 2020

## 2020-06-02 ENCOUNTER — VIRTUAL VISIT (OUTPATIENT)
Dept: PSYCHOLOGY | Facility: CLINIC | Age: 20
End: 2020-06-02
Payer: COMMERCIAL

## 2020-06-02 DIAGNOSIS — F41.1 GENERALIZED ANXIETY DISORDER: Primary | ICD-10-CM

## 2020-06-02 DIAGNOSIS — F33.2 MAJOR DEPRESSIVE DISORDER, RECURRENT EPISODE, SEVERE (H): ICD-10-CM

## 2020-06-02 PROCEDURE — 90834 PSYTX W PT 45 MINUTES: CPT | Mod: TEL | Performed by: SOCIAL WORKER

## 2020-06-03 NOTE — PROGRESS NOTES
"                                           Progress Note    Patient Name: Iker Edge  Date: 6/2/2020         Service Type: Phone Visit (The client declined a video visit. He would prefer a phone visit.)  Video Visit: No     Session Start Time: 8:30 AM  Session End Time: 9:20 AM     Session Length: 50 Minutes    Session #: 13    Attendees: Client     The client has been notified of the following:    \"We have found that certain health care needs can be provided without the need for a face to face visit. This service lets us provide the care you need with a phone conversation. I will have full access to your [facility] medical record during this entire phone call.  I will be taking notes for your medical record. Since this is like an office visit, we will bill your insurance company for this service. There are potential benefits and risks of telephone visits (e.g. limits to patient confidentiality) that differ from in-person visits. Confidentiality still applies for telephone services, and nobody will record the visit. It is important to be in a quiet, private space that is free from distractions (including cell phone or other devices) during the visit. If during the course of the call, I believe a telephone visit is not appropriate, you will not be charged for this service\".    Consent has been obtained for this service by the provider: Yes    Treatment Plan Last Reviewed: 5/19/2020  PHQ-9 / LISA-7 : 13/12    DATA  Interactive Complexity: No  Crisis: No       Progress Since Last Session (Related to Symptoms / Goals / Homework):   Symptoms: Worsening : The client reported experiencing increased symptoms of anxiety over the past week.    Homework: Achieved / completed to satisfaction. The client reported that he has been gardening at his boyfriend's father's home at least three times each week.      Episode of Care Goals: No improvement - ACTION (Actively working towards change); Intervened by reinforcing change plan / " affirming steps taken.     Current / Ongoing Stressors and Concerns:   The client reported that his primary stressor is managing the symptoms of depression and anxiety he has been experiencing.     Treatment Objective(s) Addressed in This Session:   The client will decrease his frequency and intensity of feeling down, depressed and hopeless. The client will be mindful of the body sensations he is experiencing when he is feeling anxious.       Intervention:   Discussed and reinforced the client's efforts to garden at his boyfriend's father's home. Examined the client's increased symptoms of anxiety. Coached the client in being mindful of the body sensations he was experiencing when he was feeling anxious. Obtained a commitment from the client to continue to be mindful of the body sensations he is experiencing when he is feeling anxious.        ASSESSMENT: Current Emotional / Mental Status (status of significant symptoms):   Risk status (Self / Other harm or suicidal ideation)   Client denies current fears or concerns for personal safety.   Client denies current or recent suicidal ideation or behaviors.   Client denies current or recent homicidal ideation or behaviors.   Client denies current or recent self injurious behavior or ideation.   Client denies other safety concerns.   Client reports there has been no change in risk factors since his last session.     Client reports there has been no change in protective factors since his last session.     Recommended that the client call 911 or go to the local emergency department should there be a change in any of these risk factors.     Attitude:   Cooperative    Orientation:   All   Speech    Rate / Production: Normal     Volume:  Normal    Mood:    Anxious    Thought Content:  Clear    Thought Form:  Coherent  Logical    Insight:    Good      Medication Review:   No changes to current psychiatric medication(s).     Medication Compliance:   Yes     Changes in Health  Issues:   None reported.     Chemical Use Review:   Substance Use: Chemical use reviewed. Will continue to be assessed.      Tobacco Use: No current tobacco use.      Diagnosis:  1. Generalized anxiety disorder    2. Major depressive disorder, recurrent episode, severe (H)        Collateral Reports Completed:   Not Applicable    PLAN: (Patient Tasks / Therapist Tasks / Other)  Continue to be mindful of the body sensations he is experiencing when he is feeling anxious.        I have reviewed the note as documented above. This accurately captures the substance of my conversation with the client.  JAMAAL Hoffmann.S.GUERITA., ATIYA.                                                         ______________________________________________________________________    Treatment Plan    Patient's Name: Iker Edge  YOB: 2000    Date: 5/19/2020    DSM5 Diagnoses:       296.33 (F33.2) Major Depressive Disorder, Recurrent Episode, Severe    300.02 (F41.1) Generalized Anxiety Disorder    Psychosocial / Contextual Factors: The client is a [age] year old, , single male who resides with his partner in an apartment in [place], [place]. The client is employed full-time as a  at [employer] in [place], [place]. The client reported that he is attending individual therapy at this time because he has been experiencing symptoms of both depression and anxiety.    WHODAS: 28    Referral / Collaboration:  Referral to another professional/service is not indicated at this time.    Anticipated number of session or this episode of care: 12    MeasurableTreatment Goal(s) related to diagnosis / functional impairment(s)  Goal 1: The client will learn and apply skills to manage the symptoms of depression he has been experiencing.    I will know I've met my goal when I don't feel so down and depressed.      Objective #A   The client will decrease his frequency and intensity of feeling down, depressed and  "hopeless.  Status: Continued - Date(s): 5/19/2020     Intervention(s)  Therapist will assist and support the client in decreasing his frequency and intensity of feeling down, depressed and hopeless.    Objective #B  The client will improve his concentration, focus, and ability to be mindful in daily activities.   Status: Continued - Date(s): 5/19/2020     Intervention(s)  Therapist will assist and support the client in improving his concentration, focus, and ability to be mindful in daily activities.    Objective #C  The client will decrease his frequency and intensity of suicidal ideation.  Status: Continued - Date(s): 5/19/2020     Intervention(s)  Therapist will assist and support the client in decreasing his frequency and intensity of suicidal ideation.      Goal 2: The client will learn and apply skills to manage the symptoms of anxiety he has been experiencing.    I will know I've met my goal when I don't worry so much.       Objective #A  The client will use \"worry time\" each day for thirty minutes of scheduled worry and then defer obsessive or anxious thinking until the next structured \"worry time\".    Status: Continued - Date(s): 5/19/2020     Intervention(s)  Therapist will teach and support the client in learning and using \"worry time\".    Objective #B  The client will use relaxation strategies two times per day to reduce the physical symptoms of anxiety.  Status: Continued - Date(s): 5/19/2020     Intervention(s)  Therapist will teach and support the client in learning and using relaxation strategies.    Objective #C  The client will use cognitive strategies identified in therapy to challenge anxious thoughts.    Status: Continued - Date(s): 5/19/2020     Intervention(s)  Therapist will teach and support the client in learning and using cognitive strategies to challenge anxious thoughts.     Objective #D  The client will learn how to use his senses to ground himself in his body.    Status: Continued - " Date(s): 5/19/2020     Intervention(s)  Therapist will  and support the client in learning how to use his senses to ground himself in his body.    Objective #E  The client will be mindful of the body sensations he is experiencing when he is feeling anxious.    Status: Continued - Date(s): 5/19/2020     Intervention(s)  Therapist will  and support the client in being mindful of the body sensations he is experiencing when he is feeling anxious.      The client has reviewed and verbally agreed to the above plan.      Leticia Goodman, M.S.W., L.I.C.S.W.  May 20, 2020

## 2020-06-16 ENCOUNTER — VIRTUAL VISIT (OUTPATIENT)
Dept: PSYCHOLOGY | Facility: CLINIC | Age: 20
End: 2020-06-16
Payer: COMMERCIAL

## 2020-06-16 DIAGNOSIS — F41.1 GENERALIZED ANXIETY DISORDER: Primary | ICD-10-CM

## 2020-06-16 DIAGNOSIS — F33.2 MAJOR DEPRESSIVE DISORDER, RECURRENT EPISODE, SEVERE (H): ICD-10-CM

## 2020-06-16 PROCEDURE — 90834 PSYTX W PT 45 MINUTES: CPT | Mod: TEL | Performed by: SOCIAL WORKER

## 2020-06-17 NOTE — PROGRESS NOTES
"                                           Progress Note    Patient Name: Iker Edge  Date: 6/16/2020         Service Type: Phone Visit (The client declined a video visit. He would prefer a phone visit.)  Video Visit: No     Session Start Time: 8:30 AM  Session End Time: 9:20 AM     Session Length: 50 Minutes    Session #: 14    Attendees: Client     The client has been notified of the following:    \"We have found that certain health care needs can be provided without the need for a face to face visit. This service lets us provide the care you need with a phone conversation. I will have full access to your [facility] medical record during this entire phone call.  I will be taking notes for your medical record. Since this is like an office visit, we will bill your insurance company for this service. There are potential benefits and risks of telephone visits (e.g. limits to patient confidentiality) that differ from in-person visits. Confidentiality still applies for telephone services, and nobody will record the visit. It is important to be in a quiet, private space that is free from distractions (including cell phone or other devices) during the visit. If during the course of the call, I believe a telephone visit is not appropriate, you will not be charged for this service\".    Consent has been obtained for this service by the provider: Yes    Treatment Plan Last Reviewed: 5/19/2020  PHQ-9 / LISA-7 : 13/12    DATA  Interactive Complexity: No  Crisis: No       Progress Since Last Session (Related to Symptoms / Goals / Homework):   Symptoms: Improving : The client reported being better able to manage the symptoms of anxiety he has been experiencing.    Homework: Achieved / completed to satisfaction. The client reported that he has attempted to continue to be mindful of the body sensations he is experiencing when he is feeling anxious.      Episode of Care Goals: Satisfactory progress - ACTION (Actively working towards " change); Intervened by reinforcing change plan / affirming steps taken.     Current / Ongoing Stressors and Concerns:   The client reported that his primary stressor is managing the symptoms of depression and anxiety he has been experiencing.     Treatment Objective(s) Addressed in This Session:   The client will be mindful of the body sensations he is experiencing when he is feeling anxious.       Intervention:   Talked at length about the client's improved ability to manage the symptoms of anxiety he has been experiencing. Discussed and reinforced the client's efforts to continue to be mindful of the body sensations he is experiencing when he is feeling anxious. Obtained a commitment from the client to continue to continue to be mindful of the body sensations he is experiencing when he is feeling anxious.        ASSESSMENT: Current Emotional / Mental Status (status of significant symptoms):   Risk status (Self / Other harm or suicidal ideation)   Client denies current fears or concerns for personal safety.   Client denies current or recent suicidal ideation or behaviors.   Client denies current or recent homicidal ideation or behaviors.   Client denies current or recent self injurious behavior or ideation.   Client denies other safety concerns.   Client reports there has been no change in risk factors since his last session.     Client reports there has been no change in protective factors since his last session.     Recommended that the client call 911 or go to the local emergency department should there be a change in any of these risk factors.     Attitude:   Cooperative    Orientation:   All   Speech    Rate / Production: Normal     Volume:  Normal    Mood:    Slightly Anxious   Thought Content:  Clear    Thought Form:  Coherent  Logical    Insight:    Good      Medication Review:   No changes to current psychiatric medication(s).     Medication Compliance:   Yes     Changes in Health Issues:   None  reported.     Chemical Use Review:   Substance Use: Chemical use reviewed. Will continue to be assessed.      Tobacco Use: No current tobacco use.      Diagnosis:  1. Generalized anxiety disorder    2. Major depressive disorder, recurrent episode, severe (H)        Collateral Reports Completed:   Not Applicable    PLAN: (Patient Tasks / Therapist Tasks / Other)  Continue to be mindful of the body sensations he is experiencing when he is feeling anxious.        I have reviewed the note as documented above. This accurately captures the substance of my conversation with the client.  JAMAAL Hoffmann.S.GUERITA., ATIYA.                                                         ______________________________________________________________________    Treatment Plan    Patient's Name: Iker Edge  YOB: 2000    Date: 5/19/2020    DSM5 Diagnoses:       296.33 (F33.2) Major Depressive Disorder, Recurrent Episode, Severe    300.02 (F41.1) Generalized Anxiety Disorder    Psychosocial / Contextual Factors: The client is a [age] year old, , single male who resides with his partner in an apartment in [place], [place]. The client is employed full-time as a  at [employer] in [place], [place]. The client reported that he is attending individual therapy at this time because he has been experiencing symptoms of both depression and anxiety.    WHODAS: 28    Referral / Collaboration:  Referral to another professional/service is not indicated at this time.    Anticipated number of session or this episode of care: 12    MeasurableTreatment Goal(s) related to diagnosis / functional impairment(s)  Goal 1: The client will learn and apply skills to manage the symptoms of depression he has been experiencing.    I will know I've met my goal when I don't feel so down and depressed.      Objective #A   The client will decrease his frequency and intensity of feeling down, depressed and hopeless.  Status: Continued  "- Date(s): 5/19/2020     Intervention(s)  Therapist will assist and support the client in decreasing his frequency and intensity of feeling down, depressed and hopeless.    Objective #B  The client will improve his concentration, focus, and ability to be mindful in daily activities.   Status: Continued - Date(s): 5/19/2020     Intervention(s)  Therapist will assist and support the client in improving his concentration, focus, and ability to be mindful in daily activities.    Objective #C  The client will decrease his frequency and intensity of suicidal ideation.  Status: Continued - Date(s): 5/19/2020     Intervention(s)  Therapist will assist and support the client in decreasing his frequency and intensity of suicidal ideation.      Goal 2: The client will learn and apply skills to manage the symptoms of anxiety he has been experiencing.    I will know I've met my goal when I don't worry so much.       Objective #A  The client will use \"worry time\" each day for thirty minutes of scheduled worry and then defer obsessive or anxious thinking until the next structured \"worry time\".    Status: Continued - Date(s): 5/19/2020     Intervention(s)  Therapist will teach and support the client in learning and using \"worry time\".    Objective #B  The client will use relaxation strategies two times per day to reduce the physical symptoms of anxiety.  Status: Continued - Date(s): 5/19/2020     Intervention(s)  Therapist will teach and support the client in learning and using relaxation strategies.    Objective #C  The client will use cognitive strategies identified in therapy to challenge anxious thoughts.    Status: Continued - Date(s): 5/19/2020     Intervention(s)  Therapist will teach and support the client in learning and using cognitive strategies to challenge anxious thoughts.     Objective #D  The client will learn how to use his senses to ground himself in his body.    Status: Continued - Date(s): 5/19/2020 "     Intervention(s)  Therapist will  and support the client in learning how to use his senses to ground himself in his body.    Objective #E  The client will be mindful of the body sensations he is experiencing when he is feeling anxious.    Status: Continued - Date(s): 5/19/2020     Intervention(s)  Therapist will  and support the client in being mindful of the body sensations he is experiencing when he is feeling anxious.      The client has reviewed and verbally agreed to the above plan.      Leticia Goodman, M.S.W., L.I.C.S.W.  May 20, 2020

## 2020-06-18 ENCOUNTER — VIRTUAL VISIT (OUTPATIENT)
Dept: FAMILY MEDICINE | Facility: CLINIC | Age: 20
End: 2020-06-18
Payer: COMMERCIAL

## 2020-06-18 DIAGNOSIS — F64.0 GENDER IDENTITY DISORDER IN ADULT: ICD-10-CM

## 2020-06-18 DIAGNOSIS — Q87.2 KLIPPEL TRENAUNAY SYNDROME: ICD-10-CM

## 2020-06-18 DIAGNOSIS — F33.1 MODERATE RECURRENT MAJOR DEPRESSION (H): Primary | ICD-10-CM

## 2020-06-18 PROCEDURE — 99214 OFFICE O/P EST MOD 30 MIN: CPT | Mod: GT | Performed by: FAMILY MEDICINE

## 2020-06-18 ASSESSMENT — ANXIETY QUESTIONNAIRES
1. FEELING NERVOUS, ANXIOUS, OR ON EDGE: MORE THAN HALF THE DAYS
5. BEING SO RESTLESS THAT IT IS HARD TO SIT STILL: NOT AT ALL
2. NOT BEING ABLE TO STOP OR CONTROL WORRYING: SEVERAL DAYS
IF YOU CHECKED OFF ANY PROBLEMS ON THIS QUESTIONNAIRE, HOW DIFFICULT HAVE THESE PROBLEMS MADE IT FOR YOU TO DO YOUR WORK, TAKE CARE OF THINGS AT HOME, OR GET ALONG WITH OTHER PEOPLE: SOMEWHAT DIFFICULT
3. WORRYING TOO MUCH ABOUT DIFFERENT THINGS: SEVERAL DAYS
6. BECOMING EASILY ANNOYED OR IRRITABLE: SEVERAL DAYS
GAD7 TOTAL SCORE: 6
7. FEELING AFRAID AS IF SOMETHING AWFUL MIGHT HAPPEN: SEVERAL DAYS

## 2020-06-18 ASSESSMENT — PATIENT HEALTH QUESTIONNAIRE - PHQ9
5. POOR APPETITE OR OVEREATING: NOT AT ALL
SUM OF ALL RESPONSES TO PHQ QUESTIONS 1-9: 3

## 2020-06-18 NOTE — PATIENT INSTRUCTIONS
Refill for zoloft has been sent to pharmacy.    Refill for the compression stockings has been attached to this visit summary.    Your moods are stable.  Continue medication at current dose.  If symptoms change/worsen, see doctor again.    If you have a clinic preference for hormone therapy treatment, contact the care team for a referral - specify the name of the team you would like to see.  We will also look into possible  [facility] clinics that you may go to.    Our Clinic hours are:  Mondays    7:20 am - 7 pm  Tues -  Fri  7:20 am - 5 pm    Clinic Phone: [phone]    The clinic lab opens at 7:30 am Mon - Fri and appointments are required.    [facility]. [phone]  Monday  8 am - 7pm  Tues - Fri 8 am - 5:30 pm

## 2020-06-18 NOTE — PROGRESS NOTES
"Iker Edge is a 20 year old male who is being evaluated via a billable video visit.      The patient has been notified of following:     \"This video visit will be conducted via a call between you and your physician/provider. We have found that certain health care needs can be provided without the need for an in-person physical exam.  This service lets us provide the care you need with a video conversation.  If a prescription is necessary we can send it directly to your pharmacy.  If lab work is needed we can place an order for that and you can then stop by our lab to have the test done at a later time.    Video visits are billed at different rates depending on your insurance coverage.  Please reach out to your insurance provider with any questions.    If during the course of the call the physician/provider feels a video visit is not appropriate, you will not be charged for this service.\"    Patient has given verbal consent for Video visit? Yes    How would you like to obtain your AVS? Mail a copy  Patient would like the video invitation sent by: Send to e-mail at: [email or web address]    Will anyone else be joining your video visit? No    Subjective     Iker Edge is a 20 year old male who presents today via video visit for the following health issues:    HPI   Chief Complaint   Patient presents with     Patient Request     Patient is intrested in exploring transgender options. Patient is mostly looking at hormone therapy.        Depression Followup    How are you doing with your depression since your last visit? Improved     Are you having other symptoms that might be associated with depression? No    Have you had a significant life event?  OTHER: moved out alone      Are you feeling anxious or having panic attacks?   Yes:  anxious    Do you have any concerns with your use of alcohol or other drugs? No    Social History     Tobacco Use     Smoking status: Never Smoker     Smokeless tobacco: Never Used     " Tobacco comment: no tobacco exposure   Substance Use Topics     Alcohol use: Yes     Comment: 1-2 weekly     Drug use: No     PHQ 12/16/2019 2/24/2020 6/18/2020   PHQ-9 Total Score 7 7 3   Q9: Thoughts of better off dead/self-harm past 2 weeks Not at all Not at all Not at all   F/U: Thoughts of suicide or self-harm - - -   F/U: Safety concerns - - -     LISA-7 SCORE 12/16/2019 2/24/2020 6/18/2020   Total Score 10 9 6     Last PHQ-9 6/18/2020   1.  Little interest or pleasure in doing things 0   2.  Feeling down, depressed, or hopeless 0   3.  Trouble falling or staying asleep, or sleeping too much 0   4.  Feeling tired or having little energy 0   5.  Poor appetite or overeating 2   6.  Feeling bad about yourself 0   7.  Trouble concentrating 0   8.  Moving slowly or restless 1   Q9: Thoughts of better off dead/self-harm past 2 weeks 0   PHQ-9 Total Score 3   Difficulty at work, home, or with people -   In the past two weeks have you had thoughts of suicide or self harm? -   Do you have concerns about your personal safety or the safety of others? -     LISA-7  6/18/2020   1. Feeling nervous, anxious, or on edge 2   2. Not being able to stop or control worrying 1   3. Worrying too much about different things 1   4. Trouble relaxing 0   5. Being so restless that it is hard to sit still 0   6. Becoming easily annoyed or irritable 1   7. Feeling afraid, as if something awful might happen 1   LISA-7 Total Score 6   If you checked any problems, how difficult have they made it for you to do your work, take care of things at home, or get along with other people? Somewhat difficult     In the past two weeks have you had thoughts of suicide or self-harm?  No.    Do you have concerns about your personal safety or the safety of others?   No    Suicide Assessment Five-step Evaluation and Treatment (SAFE-T)      How many servings of fruits and vegetables do you eat daily?  4 or more    On average, how many sweetened beverages do you  drink each day (Examples: soda, juice, sweet tea, etc.  Do NOT count diet or artificially sweetened beverages)?   0    How many days per week do you exercise enough to make your heart beat faster? 3 or less    How many minutes a day do you exercise enough to make your heart beat faster? 20 - 29    How many days per week do you miss taking your medication? 0-1         Video Start Time: 9:20 am    Patient would like to start seeing a provider for transgender treatment.  He would like to pursue male to female transition.  He has a female-to-male partner currently on testosterone tx who sees a provider in a clinic in [place].  He is not sure where he would like to go yet.    Patient Active Problem List   Diagnosis     Epidermal inclusion cyst     Allergic conjunctivitis, bilateral     Seasonal allergic rhinitis     Klippel Trenaunay syndrome     Venous (peripheral) insufficiency     Varicose veins of left leg with edema     Anxiety and depression     Moderate recurrent major depression (H)     Gender identity disorder in adult     Past Surgical History:   Procedure Laterality Date     SURGICAL HISTORY OF -       bilateral myringotomy tubes       Social History     Tobacco Use     Smoking status: Never Smoker     Smokeless tobacco: Never Used     Tobacco comment: no tobacco exposure   Substance Use Topics     Alcohol use: Yes     Comment: 1-2 weekly     Family History   Problem Relation Age of Onset     Thyroid Disease Mother         has 1/2 thyroid     Gastrointestinal Disease Mother         ulcerative colitis     Depression Mother      Anxiety Disorder Mother      Eye Disorder Paternal Grandmother         cataracts     Anemia Paternal Grandmother      Hypertension Father      Depression Father      Substance Abuse Father      Hernia Father      Heart Disease Father          Current Outpatient Medications   Medication Sig Dispense Refill     order for DME Equipment being ordered: Compression stockings - upper  "thigh-high - 20-30 (maximum compression) - one pair 1 each 1     sertraline (ZOLOFT) 50 MG tablet Take 1 tablet (50 mg) by mouth daily 90 tablet 1     No Known Allergies    Reviewed and updated as needed this visit by Provider  Tobacco  Allergies  Meds  Problems  Med Hx  Surg Hx  Fam Hx         Review of Systems   Constitutional, HEENT, cardiovascular, pulmonary, GI, , musculoskeletal, neuro, skin, endocrine and psych systems are negative, except as otherwise noted.      Objective    There were no vitals taken for this visit.  Estimated body mass index is 17.76 kg/m  as calculated from the following:    Height as of 2/24/20: 1.765 m (5' 9.5\").    Weight as of 2/24/20: 55.3 kg (122 lb).  Physical Exam     GENERAL: Healthy, alert and no distress  EYES: Eyes grossly normal to inspection.  No discharge or erythema, or obvious scleral/conjunctival abnormalities.  RESP: No audible wheeze, cough, or visible cyanosis.  No visible retractions or increased work of breathing.    SKIN: Visible skin clear. No significant rash, abnormal pigmentation or lesions.  NEURO: Cranial nerves grossly intact.  Mentation and speech appropriate for age.  PSYCH: Mentation appears normal, affect normal/bright, judgement and insight intact, normal speech and appearance well-groomed.      Diagnostic Test Results:  none         Assessment & Plan     Jack was seen today for patient request.    Diagnoses and all orders for this visit:    Moderate recurrent major depression (H)  -     sertraline (ZOLOFT) 50 MG tablet; Take 1 tablet (50 mg) by mouth daily  Stable mood.  Continue current med dose.  Suicidal precautions reinforced.    Klippel Trenaunay syndrome  -     order for DME; Equipment being ordered: Compression stockings - upper thigh-high - 20-30 (maximum compression) - one pair  Patient asked for refill of compression stockings. Denies any new concern with his legs.    Gender identity disorder in adult  Patient will be contacted " within the next week re: [facility] resources regarding transgender treatment if available.  Patient will also contact care team if he has preference on a particular clinic he may need a referral to for this.  Patient reassured provider that he has thought through this process, and he said that it is time for him to pursue.  Patient was offered to follow up any time if with any other concerns about this         Patient Instructions   Refill for zoloft has been sent to pharmacy.    Refill for the compression stockings has been attached to this visit summary.    Your moods are stable.  Continue medication at current dose.  If symptoms change/worsen, see doctor again.    If you have a clinic preference for hormone therapy treatment, contact the care team for a referral - specify the name of the team you would like to see.  We will also look into possible  [facility] clinics that you may go to.    Our Clinic hours are:  Mondays    7:20 am - 7 pm  Tues - Fri  7:20 am - 5 pm    Clinic Phone: [phone]    The clinic lab opens at 7:30 am Mon - Fri and appointments are required.    [facility]. [phone]  Monday  8 am - 7pm  Tues - Fri 8 am - 5:30 pm             Return in about 6 months (around 12/18/2020) for depression follow up; sooner if with any other concern.    Paul Pacheco MD  [facility]      Video-Visit Details    Type of service:  Video Visit    Video End Time:9:37 am    Originating Location (pt. Location): Home    Distant Location (provider location):  [facility]     Platform used for Video Visit: Edy    Return in about 6 months (around 12/18/2020) for depression follow up; sooner if with any other concern.       Paul Pacheco MD

## 2020-06-19 ASSESSMENT — ANXIETY QUESTIONNAIRES: GAD7 TOTAL SCORE: 6

## 2020-06-24 ENCOUNTER — TELEPHONE (OUTPATIENT)
Dept: FAMILY MEDICINE | Facility: CLINIC | Age: 20
End: 2020-06-24

## 2020-06-24 DIAGNOSIS — F64.0 GENDER IDENTITY DISORDER IN ADULT: Primary | ICD-10-CM

## 2020-06-24 NOTE — TELEPHONE ENCOUNTER
Attempted to reach pt but no answer and unable to leave a message.  Mail box is full.    Pt does not have a [employer] account.    Alma Caba RN

## 2020-06-24 NOTE — TELEPHONE ENCOUNTER
Paul Pacheco MD 9 minutes ago (2:37 PM)         Please call patient.  After our virtual visit last week, I did hear back from [facility] in [facility].  A referral has been sent on patient's behalf and they will call patient to start the process.   Patient may pursue this referral but is free to pursue other gender care clinics if patient so chooses.

## 2020-06-24 NOTE — PROGRESS NOTES
[place] fax[place] [facility] [place] [phone]. Gloria Maier on 6/24/2020 at 2:57 PM

## 2020-06-26 ENCOUNTER — TELEPHONE (OUTPATIENT)
Dept: PLASTIC SURGERY | Facility: CLINIC | Age: 20
End: 2020-06-26

## 2020-06-26 NOTE — TELEPHONE ENCOUNTER
Pt was interested in starting HRT. Pt lives in [place]. Writer gave pt the phone number for Dr. Aman FOREMAN. Will follow up with pt in 1 week. Rp-

## 2020-06-30 ENCOUNTER — VIRTUAL VISIT (OUTPATIENT)
Dept: FAMILY MEDICINE | Facility: CLINIC | Age: 20
End: 2020-06-30
Payer: COMMERCIAL

## 2020-06-30 DIAGNOSIS — F64.9 GENDER DYSPHORIA: Primary | ICD-10-CM

## 2020-06-30 PROCEDURE — 99214 OFFICE O/P EST MOD 30 MIN: CPT | Mod: GT | Performed by: INTERNAL MEDICINE

## 2020-06-30 RX ORDER — SPIRONOLACTONE 50 MG/1
TABLET, FILM COATED ORAL
Qty: 60 TABLET | Refills: 3 | Status: SHIPPED | OUTPATIENT
Start: 2020-06-30 | End: 2020-10-13

## 2020-06-30 RX ORDER — ESTRADIOL 2 MG/1
2 TABLET ORAL DAILY
Qty: 30 TABLET | Refills: 3 | Status: SHIPPED | OUTPATIENT
Start: 2020-06-30 | End: 2020-10-13

## 2020-06-30 NOTE — PROGRESS NOTES
"Iker Edge is a 20 year old male who is being evaluated via a billable video visit.      The patient has been notified of following:     \"This video visit will be conducted via a call between you and your physician/provider. We have found that certain health care needs can be provided without the need for an in-person physical exam.  This service lets us provide the care you need with a video conversation.  If a prescription is necessary we can send it directly to your pharmacy.  If lab work is needed we can place an order for that and you can then stop by our lab to have the test done at a later time.    Video visits are billed at different rates depending on your insurance coverage.  Please reach out to your insurance provider with any questions.    If during the course of the call the physician/provider feels a video visit is not appropriate, you will not be charged for this service.\"    Patient has given verbal consent for Video visit? Yes  How would you like to obtain your AVS? Mail a copy  Patient would like the video invitation sent by: Send to e-mail at: [email or web address]  Will anyone else be joining your video visit? No    Subjective     Iker Edge is a 20 year old adult who presents today via video visit for the following health issues:  Chief Complaint   Patient presents with     Consult     hormone replacement therapy       HPI     Iker was assigned male at birth but identifies as non-binary on the feminine side and uses they/them pronouns.  Iker reports starting to question their gender since high school.  These feelings have become strong, and they are wanting to start feminizing hormones.  They are not considering surgery at this point.  Iker recently moved out on their own.  They sees a therapist and have been discussing transition with her for a couple of months.  Iker's boyfriend and friends and family have been supportive.  They are somewhat stressed about coming out to people at work, but " they think that it will go okay because their coworkers are also friends.         Jack has no history of blood clots, stroke, heart, liver, or kidney disease.  They do have a family history of high cholesterol.     Video Start Time: 11:25am      Patient Active Problem List   Diagnosis     Epidermal inclusion cyst     Allergic conjunctivitis, bilateral     Seasonal allergic rhinitis     Klippel Trenaunay syndrome     Venous (peripheral) insufficiency     Varicose veins of left leg with edema     Anxiety and depression     Moderate recurrent major depression (H)     Gender dysphoria in adult     Past Surgical History:   Procedure Laterality Date     SURGICAL HISTORY OF -       bilateral myringotomy tubes       Social History     Tobacco Use     Smoking status: Never Smoker     Smokeless tobacco: Never Used     Tobacco comment: no tobacco exposure   Substance Use Topics     Alcohol use: Yes     Comment: 1-2 weekly     Family History   Problem Relation Age of Onset     Thyroid Disease Mother         has 1/2 thyroid     Gastrointestinal Disease Mother         ulcerative colitis     Depression Mother      Anxiety Disorder Mother      Eye Disorder Paternal Grandmother         cataracts     Anemia Paternal Grandmother      Hypertension Father      Depression Father      Substance Abuse Father      Hernia Father      Heart Disease Father          Current Outpatient Medications   Medication Sig Dispense Refill     sertraline (ZOLOFT) 50 MG tablet Take 1 tablet (50 mg) by mouth daily 90 tablet 1     order for DME Equipment being ordered: Compression stockings - upper thigh-high - 20-30 (maximum compression) - one pair 1 each 1     No Known Allergies    Reviewed and updated as needed this visit by Provider         Review of Systems   Constitutional, endo systems are negative, except as otherwise noted.      Objective             Physical Exam     GENERAL: Healthy, alert and no distress  EYES: Eyes grossly normal to inspection.   No discharge or erythema, or obvious scleral/conjunctival abnormalities.  RESP: No audible wheeze, cough, or visible cyanosis.  No visible retractions or increased work of breathing.    SKIN: Visible skin clear. No significant rash, abnormal pigmentation or lesions.  NEURO: Cranial nerves grossly intact.  Mentation and speech appropriate for age.  PSYCH: Mentation appears normal, affect normal/bright, judgement and insight intact, normal speech and appearance well-groomed.            Assessment & Plan     1. Gender dysphoria: non-binary gender identity    Iker was assigned male at birth but identifies as non-binary and they would like to start feminizing hormone therapy.  They have no contraindication.  Risks and benefits were reviewed and verbal consent was obtained to start therapy; copy of form will be mailed to Iker.  Will start hormones as below and check BMP in 2 weeks.  Will check hormone labs, BMP, and cholesterol (has FHx of high cholesterol) in 3 months and follow-up with office visit to see how things go.      Iker is following with a therapist and reports good social support for transition.  They are not sure if they wish to pursue surgery in the future or not.      - estradiol (ESTRACE) 2 MG tablet; Take 1 tablet (2 mg) by mouth daily  Dispense: 30 tablet; Refill: 3  - spironolactone (ALDACTONE) 50 MG tablet; Take 1 pill daily for one week, then increase to 1 pill twice per day.  Dispense: 60 tablet; Refill: 3  - Testosterone Total; Future  - **Basic metabolic panel FUTURE anytime; Future  - Estradiol; Future  - Basic metabolic panel; Future  - Lipid panel reflex to direct LDL Fasting; Future       Return in about 2 weeks (around 7/14/2020) for Lab Work and 3 mo lab and clinic f/u.    Jhon Brody MD  [facility]      Video-Visit Details    Type of service:  Video Visit    Video End Time:11:40 AM    Originating Location (pt. Location): Home    Distant Location (provider location):   [facility]     Platform used for Video Visit: Edy    No follow-ups on file.       Jhon Brody MD

## 2020-07-14 DIAGNOSIS — F64.9 GENDER DYSPHORIA: ICD-10-CM

## 2020-07-14 LAB
ANION GAP SERPL CALCULATED.3IONS-SCNC: 3 MMOL/L (ref 3–14)
BUN SERPL-MCNC: 11 MG/DL (ref 7–30)
CALCIUM SERPL-MCNC: 9.3 MG/DL (ref 8.5–10.1)
CHLORIDE SERPL-SCNC: 104 MMOL/L (ref 94–109)
CO2 SERPL-SCNC: 30 MMOL/L (ref 20–32)
CREAT SERPL-MCNC: 0.75 MG/DL (ref 0.66–1.25)
GFR SERPL CREATININE-BSD FRML MDRD: >90 ML/MIN/{1.73_M2}
GLUCOSE SERPL-MCNC: 64 MG/DL (ref 70–99)
POTASSIUM SERPL-SCNC: 4.2 MMOL/L (ref 3.4–5.3)
SODIUM SERPL-SCNC: 137 MMOL/L (ref 133–144)

## 2020-07-14 PROCEDURE — 36415 COLL VENOUS BLD VENIPUNCTURE: CPT | Performed by: INTERNAL MEDICINE

## 2020-07-14 PROCEDURE — 80048 BASIC METABOLIC PNL TOTAL CA: CPT | Performed by: INTERNAL MEDICINE

## 2020-07-21 ENCOUNTER — VIRTUAL VISIT (OUTPATIENT)
Dept: PSYCHOLOGY | Facility: CLINIC | Age: 20
End: 2020-07-21
Payer: COMMERCIAL

## 2020-07-21 DIAGNOSIS — F41.1 GENERALIZED ANXIETY DISORDER: Primary | ICD-10-CM

## 2020-07-21 DIAGNOSIS — F33.2 MAJOR DEPRESSIVE DISORDER, RECURRENT EPISODE, SEVERE (H): ICD-10-CM

## 2020-07-21 PROCEDURE — 90834 PSYTX W PT 45 MINUTES: CPT | Mod: TEL | Performed by: SOCIAL WORKER

## 2020-07-21 NOTE — PROGRESS NOTES
"                                           Progress Note    Patient Name: Iker Edge  Date: 7/21/2020         Service Type: Phone Visit (The client declined a video visit. He would prefer a phone visit.)  Video Visit: No     Session Start Time: 8:30 AM  Session End Time: 9:20 AM     Session Length: 50 Minutes    Session #: 15    Attendees: Client     The client has been notified of the following:    \"We have found that certain health care needs can be provided without the need for a face to face visit. This service lets us provide the care you need with a phone conversation. I will have full access to your [facility] medical record during this entire phone call.  I will be taking notes for your medical record. Since this is like an office visit, we will bill your insurance company for this service. There are potential benefits and risks of telephone visits (e.g. limits to patient confidentiality) that differ from in-person visits. Confidentiality still applies for telephone services, and nobody will record the visit. It is important to be in a quiet, private space that is free from distractions (including cell phone or other devices) during the visit. If during the course of the call, I believe a telephone visit is not appropriate, you will not be charged for this service\".    Consent has been obtained for this service by the provider: Yes    Treatment Plan Last Reviewed: 5/19/2020  PHQ-9 / LISA-7 : 13/12    DATA  Interactive Complexity: No  Crisis: No       Progress Since Last Session (Related to Symptoms / Goals / Homework):   Symptoms: Worsening : The client reported experiencing more intense symptoms of anxiety over the past two weeks.    Homework: Achieved / completed to satisfaction. The client reported that he has attempted to continue to be mindful of the body sensations he is experiencing when he is feeling anxious.      Episode of Care Goals: No improvement - ACTION (Actively working towards change); " Intervened by reinforcing change plan / affirming steps taken.     Current / Ongoing Stressors and Concerns:   The client reported that his primary stressor is managing the symptoms of depression and anxiety he has been experiencing.     Treatment Objective(s) Addressed in This Session:   The client will be mindful of the body sensations he is experiencing when he is feeling anxious.       Intervention:   Discussed and reinforced the client's efforts to continue to be mindful of the body sensations he is experiencing when he is feeling anxious. Obtained a commitment from the client to continue to continue to be mindful of the body sensations he is experiencing when he is feeling anxious. Examined the client's increased symptoms of anxiety. Talked at length about two incidents during which he was triggered.        ASSESSMENT: Current Emotional / Mental Status (status of significant symptoms):   Risk status (Self / Other harm or suicidal ideation)   Client denies current fears or concerns for personal safety.   Client denies current or recent suicidal ideation or behaviors.   Client denies current or recent homicidal ideation or behaviors.   Client denies current or recent self injurious behavior or ideation.   Client denies other safety concerns.   Client reports there has been no change in risk factors since his last session.     Client reports there has been no change in protective factors since his last session.     Recommended that the client call 911 or go to the local emergency department should there be a change in any of these risk factors.     Attitude:   Cooperative    Orientation:   All   Speech    Rate / Production: Normal     Volume:  Normal    Mood:    Moderately Anxious   Thought Content:  Clear    Thought Form:  Coherent  Logical    Insight:    Good      Medication Review:   No changes to current psychiatric medication(s).     Medication Compliance:   Yes     Changes in Health Issues:   None  reported.     Chemical Use Review:   Substance Use: Chemical use reviewed. Will continue to be assessed.      Tobacco Use: No current tobacco use.      Diagnosis:  1. Generalized anxiety disorder    2. Major depressive disorder, recurrent episode, severe (H)        Collateral Reports Completed:   Not Applicable    PLAN: (Patient Tasks / Therapist Tasks / Other)  Continue to be mindful of the body sensations he is experiencing when he is feeling anxious.        I have reviewed the note as documented above. This accurately captures the substance of my conversation with the client.  JAMAAL Hoffmann.S.GUERITA., ATIYA.                                                         ______________________________________________________________________    Treatment Plan    Patient's Name: Iker Edge  YOB: 2000    Date: 5/19/2020    DSM5 Diagnoses:       296.33 (F33.2) Major Depressive Disorder, Recurrent Episode, Severe    300.02 (F41.1) Generalized Anxiety Disorder    Psychosocial / Contextual Factors: The client is a [age] year old, , single male who resides with his partner in an apartment in [place], [place]. The client is employed full-time as a  at [employer] in [place], [place]. The client reported that he is attending individual therapy at this time because he has been experiencing symptoms of both depression and anxiety.    WHODAS: 28    Referral / Collaboration:  Referral to another professional/service is not indicated at this time.    Anticipated number of session or this episode of care: 12    MeasurableTreatment Goal(s) related to diagnosis / functional impairment(s)  Goal 1: The client will learn and apply skills to manage the symptoms of depression he has been experiencing.    I will know I've met my goal when I don't feel so down and depressed.      Objective #A   The client will decrease his frequency and intensity of feeling down, depressed and hopeless.  Status: Continued  "- Date(s): 5/19/2020     Intervention(s)  Therapist will assist and support the client in decreasing his frequency and intensity of feeling down, depressed and hopeless.    Objective #B  The client will improve his concentration, focus, and ability to be mindful in daily activities.   Status: Continued - Date(s): 5/19/2020     Intervention(s)  Therapist will assist and support the client in improving his concentration, focus, and ability to be mindful in daily activities.    Objective #C  The client will decrease his frequency and intensity of suicidal ideation.  Status: Continued - Date(s): 5/19/2020     Intervention(s)  Therapist will assist and support the client in decreasing his frequency and intensity of suicidal ideation.      Goal 2: The client will learn and apply skills to manage the symptoms of anxiety he has been experiencing.    I will know I've met my goal when I don't worry so much.       Objective #A  The client will use \"worry time\" each day for thirty minutes of scheduled worry and then defer obsessive or anxious thinking until the next structured \"worry time\".    Status: Continued - Date(s): 5/19/2020     Intervention(s)  Therapist will teach and support the client in learning and using \"worry time\".    Objective #B  The client will use relaxation strategies two times per day to reduce the physical symptoms of anxiety.  Status: Continued - Date(s): 5/19/2020     Intervention(s)  Therapist will teach and support the client in learning and using relaxation strategies.    Objective #C  The client will use cognitive strategies identified in therapy to challenge anxious thoughts.    Status: Continued - Date(s): 5/19/2020     Intervention(s)  Therapist will teach and support the client in learning and using cognitive strategies to challenge anxious thoughts.     Objective #D  The client will learn how to use his senses to ground himself in his body.    Status: Continued - Date(s): 5/19/2020 "     Intervention(s)  Therapist will  and support the client in learning how to use his senses to ground himself in his body.    Objective #E  The client will be mindful of the body sensations he is experiencing when he is feeling anxious.    Status: Continued - Date(s): 5/19/2020     Intervention(s)  Therapist will  and support the client in being mindful of the body sensations he is experiencing when he is feeling anxious.      The client has reviewed and verbally agreed to the above plan.      Leticia Goodman, M.S.W., L.I.C.S.W.  May 20, 2020

## 2020-08-04 ENCOUNTER — VIRTUAL VISIT (OUTPATIENT)
Dept: PSYCHOLOGY | Facility: CLINIC | Age: 20
End: 2020-08-04
Payer: COMMERCIAL

## 2020-08-04 DIAGNOSIS — F41.1 GENERALIZED ANXIETY DISORDER: Primary | ICD-10-CM

## 2020-08-04 DIAGNOSIS — F33.2 MAJOR DEPRESSIVE DISORDER, RECURRENT EPISODE, SEVERE (H): ICD-10-CM

## 2020-08-04 PROCEDURE — 90834 PSYTX W PT 45 MINUTES: CPT | Mod: TEL | Performed by: SOCIAL WORKER

## 2020-08-04 NOTE — PROGRESS NOTES
"                                           Progress Note    Patient Name: Iker Edge  Date: 8/4/2020       Service Type: Phone Visit (The client declined a video visit. He would prefer a phone visit.)  Video Visit: No     Session Start Time: 8:30 AM  Session End Time: 9:15 AM     Session Length: 45 Minutes    Session #: 16    Attendees: Client     The client has been notified of the following:    \"We have found that certain health care needs can be provided without the need for a face to face visit. This service lets us provide the care you need with a phone conversation. I will have full access to your [facility] medical record during this entire phone call.  I will be taking notes for your medical record. Since this is like an office visit, we will bill your insurance company for this service. There are potential benefits and risks of telephone visits (e.g. limits to patient confidentiality) that differ from in-person visits. Confidentiality still applies for telephone services, and nobody will record the visit. It is important to be in a quiet, private space that is free from distractions (including cell phone or other devices) during the visit. If during the course of the call, I believe a telephone visit is not appropriate, you will not be charged for this service\".    Consent has been obtained for this service by the provider: Yes    Treatment Plan Last Reviewed: 5/19/2020  PHQ-9 / LISA-7 : 13/12    DATA  Interactive Complexity: No  Crisis: No       Progress Since Last Session (Related to Symptoms / Goals / Homework):   Symptoms: Improving : The client reported experiencing decreased symptoms of anxiety over the past two weeks.    Homework: Achieved / completed to satisfaction. The client reported that he has attempted to continue to be mindful of the body sensations he is experiencing when he is feeling anxious.      Episode of Care Goals: Satisfactory progress - ACTION (Actively working towards change); " Intervened by reinforcing change plan / affirming steps taken.     Current / Ongoing Stressors and Concerns:   The client reported that his primary stressor is managing the symptoms of depression and anxiety he has been experiencing.     Treatment Objective(s) Addressed in This Session:   The client will be mindful of the body sensations he is experiencing when he is feeling anxious.       Intervention:   Discussed and reinforced the client's efforts to continue to be mindful of the body sensations he is experiencing when he is feeling anxious. Obtained a commitment from the client to continue to continue to be mindful of the body sensations he is experiencing when he is feeling anxious. Examined the client's decreased symptoms of anxiety. Talked at length about the client's desire to go back to school. Obtained a commitment from the client to research possible college programs he is interested in attending.        ASSESSMENT: Current Emotional / Mental Status (status of significant symptoms):   Risk status (Self / Other harm or suicidal ideation)   Client denies current fears or concerns for personal safety.   Client denies current or recent suicidal ideation or behaviors.   Client denies current or recent homicidal ideation or behaviors.   Client denies current or recent self injurious behavior or ideation.   Client denies other safety concerns.   Client reports there has been no change in risk factors since his last session.     Client reports there has been no change in protective factors since his last session.     Recommended that the client call 911 or go to the local emergency department should there be a change in any of these risk factors.     Attitude:   Cooperative    Orientation:   All   Speech    Rate / Production: Normal     Volume:  Normal    Mood:    Slightly Anxious   Thought Content:  Clear    Thought Form:  Coherent  Logical    Insight:    Good      Medication Review:   No changes to current  psychiatric medication(s).     Medication Compliance:   Yes     Changes in Health Issues:   None reported.     Chemical Use Review:   Substance Use: Chemical use reviewed. Will continue to be assessed.      Tobacco Use: No current tobacco use.      Diagnosis:  1. Generalized anxiety disorder    2. Major depressive disorder, recurrent episode, severe (H)        Collateral Reports Completed:   Not Applicable    PLAN: (Patient Tasks / Therapist Tasks / Other)  Continue to be mindful of the body sensations he is experiencing when he is feeling anxious. Research possible college programs he is interested in attending.        I have reviewed the note as documented above. This accurately captures the substance of my conversation with the client.  JAMAAL Hoffmann.S.GUERITA., ATIYA.                                                         ______________________________________________________________________    Treatment Plan    Patient's Name: Iker Edge  YOB: 2000    Date: 5/19/2020    DSM5 Diagnoses:       296.33 (F33.2) Major Depressive Disorder, Recurrent Episode, Severe    300.02 (F41.1) Generalized Anxiety Disorder    Psychosocial / Contextual Factors: The client is a [age] year old, , single male who resides with his partner in an apartment in [place], [place]. The client is employed full-time as a  at [employer] in [place], [place]. The client reported that he is attending individual therapy at this time because he has been experiencing symptoms of both depression and anxiety.    WHODAS: 28    Referral / Collaboration:  Referral to another professional/service is not indicated at this time.    Anticipated number of session or this episode of care: 12    MeasurableTreatment Goal(s) related to diagnosis / functional impairment(s)  Goal 1: The client will learn and apply skills to manage the symptoms of depression he has been experiencing.    I will know I've met my goal when I  "don't feel so down and depressed.      Objective #A   The client will decrease his frequency and intensity of feeling down, depressed and hopeless.  Status: Continued - Date(s): 5/19/2020     Intervention(s)  Therapist will assist and support the client in decreasing his frequency and intensity of feeling down, depressed and hopeless.    Objective #B  The client will improve his concentration, focus, and ability to be mindful in daily activities.   Status: Continued - Date(s): 5/19/2020     Intervention(s)  Therapist will assist and support the client in improving his concentration, focus, and ability to be mindful in daily activities.    Objective #C  The client will decrease his frequency and intensity of suicidal ideation.  Status: Continued - Date(s): 5/19/2020     Intervention(s)  Therapist will assist and support the client in decreasing his frequency and intensity of suicidal ideation.      Goal 2: The client will learn and apply skills to manage the symptoms of anxiety he has been experiencing.    I will know I've met my goal when I don't worry so much.       Objective #A  The client will use \"worry time\" each day for thirty minutes of scheduled worry and then defer obsessive or anxious thinking until the next structured \"worry time\".    Status: Continued - Date(s): 5/19/2020     Intervention(s)  Therapist will teach and support the client in learning and using \"worry time\".    Objective #B  The client will use relaxation strategies two times per day to reduce the physical symptoms of anxiety.  Status: Continued - Date(s): 5/19/2020     Intervention(s)  Therapist will teach and support the client in learning and using relaxation strategies.    Objective #C  The client will use cognitive strategies identified in therapy to challenge anxious thoughts.    Status: Continued - Date(s): 5/19/2020     Intervention(s)  Therapist will teach and support the client in learning and using cognitive strategies to " challenge anxious thoughts.     Objective #D  The client will learn how to use his senses to ground himself in his body.    Status: Continued - Date(s): 5/19/2020     Intervention(s)  Therapist will  and support the client in learning how to use his senses to ground himself in his body.    Objective #E  The client will be mindful of the body sensations he is experiencing when he is feeling anxious.    Status: Continued - Date(s): 5/19/2020     Intervention(s)  Therapist will  and support the client in being mindful of the body sensations he is experiencing when he is feeling anxious.      The client has reviewed and verbally agreed to the above plan.      Leticia Goodman, M.S.W., L.I.C.S.W.  May 20, 2020

## 2020-08-18 ENCOUNTER — VIRTUAL VISIT (OUTPATIENT)
Dept: PSYCHOLOGY | Facility: CLINIC | Age: 20
End: 2020-08-18
Payer: COMMERCIAL

## 2020-08-18 DIAGNOSIS — F33.2 MAJOR DEPRESSIVE DISORDER, RECURRENT EPISODE, SEVERE (H): ICD-10-CM

## 2020-08-18 DIAGNOSIS — F41.1 GENERALIZED ANXIETY DISORDER: Primary | ICD-10-CM

## 2020-08-18 PROCEDURE — 90834 PSYTX W PT 45 MINUTES: CPT | Mod: TEL | Performed by: SOCIAL WORKER

## 2020-08-19 NOTE — PROGRESS NOTES
"                                           Progress Note    Patient Name: Iker Edge  Date: 8/18/2020       Service Type: Phone Visit (The client declined a video visit. He would prefer a phone visit.)  Video Visit: No     Session Start Time: 8:30 AM  Session End Time: 9:15 AM     Session Length: 45 Minutes    Session #: 17    Attendees: Client     The client has been notified of the following:    \"We have found that certain health care needs can be provided without the need for a face to face visit. This service lets us provide the care you need with a phone conversation. I will have full access to your [facility] medical record during this entire phone call.  I will be taking notes for your medical record. Since this is like an office visit, we will bill your insurance company for this service. There are potential benefits and risks of telephone visits (e.g. limits to patient confidentiality) that differ from in-person visits. Confidentiality still applies for telephone services, and nobody will record the visit. It is important to be in a quiet, private space that is free from distractions (including cell phone or other devices) during the visit. If during the course of the call, I believe a telephone visit is not appropriate, you will not be charged for this service\".    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    Homework: Achieved / completed to satisfaction. The client reported that he has attempted to continue to be mindful of the body sensations he is experiencing when he is feeling anxious. He indicated that he did research " "possible college programs he is interested in attending.      Episode of Care Goals: No improvement - ACTION (Actively working towards change); Intervened by reinforcing change plan / affirming steps taken.     Current / Ongoing Stressors and Concerns:   The client reported that his primary stressor is managing the symptoms of depression and anxiety he has been experiencing.     Treatment Objective(s) Addressed in This Session:   The client will be mindful of the body sensations he is experiencing when he is feeling anxious. The client will use \"worry time\" each day for thirty minutes of scheduled worry and then defer obsessive or anxious thinking until the next structured \"worry time\".     Intervention:   Discussed and reinforced the client's efforts to continue to be mindful of the body sensations he is experiencing when he is feeling anxious. Examined the client's increased symptoms of anxiety. 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 Reviewed the concept of \"worry time\". Obtained a commitment from the client to use \"worry time\" daily.        ASSESSMENT: Current Emotional / Mental Status (status of significant symptoms):   Risk status (Self / Other harm or suicidal ideation)   Client denies current fears or concerns for personal safety.   Client denies current or recent suicidal ideation or behaviors.   Client denies current or recent homicidal ideation or behaviors.   Client denies current or recent self injurious behavior or ideation.   Client denies other safety concerns.   Client reports there has been no change in risk factors since his last session.     Client reports there has been no change in protective factors since his last session.     Recommended that the client call 911 or go to the local emergency department should there be a change in any of these risk factors.     Attitude:   Cooperative " "   Orientation:   All   Speech    Rate / Production: Normal     Volume:  Normal    Mood:    Anxious    Thought Content:  Clear    Thought Form:  Coherent  Logical    Insight:    Good      Medication Review:   No changes to current psychiatric medication(s).     Medication Compliance:   Yes     Changes in Health Issues:   None reported.     Chemical Use Review:   Substance Use: Chemical use reviewed. Will continue to be assessed.      Tobacco Use: No current tobacco use.      Diagnosis:  1. Generalized anxiety disorder    2. Major depressive disorder, recurrent episode, severe (H)        Collateral Reports Completed:   Not Applicable    PLAN: (Patient Tasks / Therapist Tasks / Other)  Use \"worry time\" daily.        I have reviewed the note as documented above. This accurately captures the substance of my conversation with the client.  Leticia Goodman M.S.W., BENIGNOS.GUERITA.                                                         ______________________________________________________________________    Treatment Plan    Patient's Name: Iker Edge  YOB: 2000    Date: 5/19/2020    DSM5 Diagnoses:       296.33 (F33.2) Major Depressive Disorder, Recurrent Episode, Severe    300.02 (F41.1) Generalized Anxiety Disorder    Psychosocial / Contextual Factors: The client is a [age] year old, , single male who resides with his partner in an apartment in [place], [place]. The client is employed full-time as a  at [employer] in [place], [place]. The client reported that he is attending individual therapy at this time because he has been experiencing symptoms of both depression and anxiety.    WHODAS: 28    Referral / Collaboration:  Referral to another professional/service is not indicated at this time.    Anticipated number of session or this episode of care: 12    MeasurableTreatment Goal(s) related to diagnosis / functional impairment(s)  Goal 1: The client will learn and apply skills to manage " "the symptoms of depression he has been experiencing.    I will know I've met my goal when I don't feel so down and depressed.      Objective #A   The client will decrease his frequency and intensity of feeling down, depressed and hopeless.  Status: Continued - Date(s): 5/19/2020     Intervention(s)  Therapist will assist and support the client in decreasing his frequency and intensity of feeling down, depressed and hopeless.    Objective #B  The client will improve his concentration, focus, and ability to be mindful in daily activities.   Status: Continued - Date(s): 5/19/2020     Intervention(s)  Therapist will assist and support the client in improving his concentration, focus, and ability to be mindful in daily activities.    Objective #C  The client will decrease his frequency and intensity of suicidal ideation.  Status: Continued - Date(s): 5/19/2020     Intervention(s)  Therapist will assist and support the client in decreasing his frequency and intensity of suicidal ideation.      Goal 2: The client will learn and apply skills to manage the symptoms of anxiety he has been experiencing.    I will know I've met my goal when I don't worry so much.       Objective #A  The client will use \"worry time\" each day for thirty minutes of scheduled worry and then defer obsessive or anxious thinking until the next structured \"worry time\".    Status: Continued - Date(s): 5/19/2020     Intervention(s)  Therapist will teach and support the client in learning and using \"worry time\".    Objective #B  The client will use relaxation strategies two times per day to reduce the physical symptoms of anxiety.  Status: Continued - Date(s): 5/19/2020     Intervention(s)  Therapist will teach and support the client in learning and using relaxation strategies.    Objective #C  The client will use cognitive strategies identified in therapy to challenge anxious thoughts.    Status: Continued - Date(s): 5/19/2020 "     Intervention(s)  Therapist will teach and support the client in learning and using cognitive strategies to challenge anxious thoughts.     Objective #D  The client will learn how to use his senses to ground himself in his body.    Status: Continued - Date(s): 5/19/2020     Intervention(s)  Therapist will  and support the client in learning how to use his senses to ground himself in his body.    Objective #E  The client will be mindful of the body sensations he is experiencing when he is feeling anxious.    Status: Continued - Date(s): 5/19/2020     Intervention(s)  Therapist will  and support the client in being mindful of the body sensations he is experiencing when he is feeling anxious.      The client has reviewed and verbally agreed to the above plan.      Leticia Goodman M.S.W., L.I.C.S.W.  May 20, 2020

## 2020-09-09 ENCOUNTER — VIRTUAL VISIT (OUTPATIENT)
Dept: PSYCHOLOGY | Facility: CLINIC | Age: 20
End: 2020-09-09
Payer: COMMERCIAL

## 2020-09-09 DIAGNOSIS — F41.1 GENERALIZED ANXIETY DISORDER: ICD-10-CM

## 2020-09-09 DIAGNOSIS — F33.2 MAJOR DEPRESSIVE DISORDER, RECURRENT EPISODE, SEVERE (H): Primary | ICD-10-CM

## 2020-09-09 PROCEDURE — 90834 PSYTX W PT 45 MINUTES: CPT | Mod: TEL | Performed by: SOCIAL WORKER

## 2020-09-09 NOTE — PROGRESS NOTES
"                                           Progress Note    Patient Name: Iker Edge  Date: 9/9/2020       Service Type: Individual      Session Start Time: 8:30 AM  Session End Time: 9:20 AM     Session Length: 50 Minutes    Session #: 18    Attendees: Client    Service Modality:  Phone Visit    The client has been notified of the following:      \"We have found that certain health care needs can be provided without the need for a face to face visit.  This service lets us provide the care you need with a phone conversation. I will have full access to your [facility] medical record during this entire phone call. I will be taking notes for your medical record. Since this is like an office visit, we will bill your insurance company for this service. There are potential benefits and risks of telephone visits (e.g. limits to patient confidentiality) that differ from in-person visits.?Confidentiality still applies for telephone services, and nobody will record the visit. It is important to be in a quiet, private space that is free of distractions (including cell phone or other devices) during the visit. If during the course of the call, I believe a telephone visit is not appropriate, you will not be charged for this service\".     Consent has been obtained for this service by the provider: Yes      Treatment Plan Last Reviewed: 9/9/2020  PHQ-9 / LISA-7 : 13/12    DATA  Interactive Complexity: No  Crisis: No       Progress Since Last Session (Related to Symptoms / Goals / Homework):   Symptoms: Worsening : The client reported experiencing increased symptoms of depression including a depressed mood, anhedonia, decreased motivation, hypersomnia, difficulty concentrating and decreased appetite.    Homework: Achieved / completed to satisfaction. The client reported that he has been using \"worry time\" daily.      Episode of Care Goals: No improvement - ACTION (Actively working towards change); Intervened by reinforcing change " "plan / affirming steps taken.     Current / Ongoing Stressors and Concerns:   The client reported that his primary stressor is managing the symptoms of depression and anxiety he has been experiencing.     Treatment Objective(s) Addressed in This Session:   The client will use \"worry time\" each day for thirty minutes of scheduled worry and then defer obsessive or anxious thinking until the next structured \"worry time\". The client will participate in the review and revision of the individual treatment plan.     Intervention:   Discussed and reinforced the client's efforts to use \"worry time\". Examined the client's increased symptoms of depression. Obtained a commitment from the client to consider contacting his primary care provider to discuss reassessing his medications. Reviewed and revised the individual treatment plan.        ASSESSMENT: Current Emotional / Mental Status (status of significant symptoms):   Risk status (Self / Other harm or suicidal ideation)   Client denies current fears or concerns for personal safety.   Client denies current or recent suicidal ideation or behaviors.   Client denies current or recent homicidal ideation or behaviors.   Client denies current or recent self injurious behavior or ideation.   Client denies other safety concerns.   Client reports there has been no change in risk factors since his last session.     Client reports there has been no change in protective factors since his last session.     Recommended that the client call 911 or go to the local emergency department should there be a change in any of these risk factors.     Attitude:   Cooperative    Orientation:   All   Speech    Rate / Production: Monotone     Volume:  Normal    Mood:    Depressed    Thought Content:  Clear    Thought Form:  Coherent  Logical    Insight:    Good      Medication Review:   No changes to current psychiatric medication(s).     Medication Compliance:   Yes     Changes in Health Issues:   None " reported.     Chemical Use Review:   Substance Use: Chemical use reviewed. Will continue to be assessed.     Tobacco Use: No current tobacco use.      Diagnosis:  1. Major depressive disorder, recurrent episode, severe (H)    2. Generalized anxiety disorder      Collateral Reports Completed:   Not Applicable    PLAN: (Patient Tasks / Therapist Tasks / Other)  Consider contacting his primary care provider to discuss reassessing his medications.        I have reviewed the note as documented above. This accurately captures the substance of my conversation with the client.  Leticia Goodman M.S.GUERITA., ATIYA.                                                         ______________________________________________________________________    Treatment Plan     Patient's Name: Iker Edge                   YOB: 2000     Date: 9/9/2020     DSM5 Diagnoses:               296.33 (F33.2) Major Depressive Disorder, Recurrent Episode, Severe               300.02 (F41.1) Generalized Anxiety Disorder     Psychosocial / Contextual Factors: The client is a [age] year old, , single male who resides with his partner in an apartment in [place], [place]. The client is employed full-time as a  at [employer] in [place], [place]. The client reported that he is attending individual therapy at this time because he has been experiencing symptoms of both depression and anxiety.     WHODAS: 28     Referral / Collaboration:  Referral to another professional/service is not indicated at this time.     Anticipated number of session or this episode of care: 12     MeasurableTreatment Goal(s) related to diagnosis / functional impairment(s)  Goal 1: The client will learn and apply skills to manage the symptoms of depression he has been experiencing.    I will know I've met my goal when I don't feel so down and depressed.       Objective #A    The client will decrease his frequency and intensity of feeling down,  "depressed and hopeless.  Status: Continued - Date(s): 9/9/2020      Intervention(s)  Therapist will assist and support the client in decreasing his frequency and intensity of feeling down, depressed and hopeless.     Objective #B  The client will improve his concentration, focus, and ability to be mindful in daily activities.   Status: Continued - Date(s): 9/9/2020      Intervention(s)  Therapist will assist and support the client in improving his concentration, focus, and ability to be mindful in daily activities.     Objective #C  The client will decrease his frequency and intensity of suicidal ideation.  Status: Continued - Date(s): 9/9/2020      Intervention(s)  Therapist will assist and support the client in decreasing his frequency and intensity of suicidal ideation.        Goal 2: The client will learn and apply skills to manage the symptoms of anxiety he has been experiencing.    I will know I've met my goal when I don't worry so much.        Objective #A  The client will use \"worry time\" each day for thirty minutes of scheduled worry and then defer obsessive or anxious thinking until the next structured \"worry time\".                          Status: Continued - Date(s): 9/9/2020      Intervention(s)  Therapist will teach and support the client in learning and using \"worry time\".     Objective #B  The client will use relaxation strategies two times per day to reduce the physical symptoms of anxiety.  Status: Continued - Date(s): 9/9/2020      Intervention(s)  Therapist will teach and support the client in learning and using relaxation strategies.     Objective #C  The client will use cognitive strategies identified in therapy to challenge anxious thoughts.                      Status: Continued - Date(s): 9/9/2020      Intervention(s)  Therapist will teach and support the client in learning and using cognitive strategies to challenge anxious thoughts.      Objective #D  The client will learn how to use his " senses to ground himself in his body.                 Status: Continued - Date(s): 9/9/2020      Intervention(s)  Therapist will  and support the client in learning how to use his senses to ground himself in his body.     Objective #E  The client will be mindful of the body sensations he is experiencing when he is feeling anxious.                        Status: Continued - Date(s): 9/9/2020      Intervention(s)  Therapist will  and support the client in being mindful of the body sensations he is experiencing when he is feeling anxious.        The client has reviewed and verbally agreed to the above plan.        Leticia Goodman M.S.W., L.I.C.S.W.              September 9, 2020

## 2020-09-17 ENCOUNTER — VIRTUAL VISIT (OUTPATIENT)
Dept: FAMILY MEDICINE | Facility: OTHER | Age: 20
End: 2020-09-17
Payer: COMMERCIAL

## 2020-09-17 PROCEDURE — 99421 OL DIG E/M SVC 5-10 MIN: CPT | Performed by: PHYSICIAN ASSISTANT

## 2020-09-17 NOTE — PROGRESS NOTES
"Date: 2020 11:33:16  Clinician: Rony Harrison  Clinician NPI: [id number]  Patient: Iker Edge  Patient : 2000  Patient Address: [address] [place], [place] [address]  Patient Phone: [phone]  Visit Protocol: URI  Patient Summary:  Iker is a 20 year old ( : 2000 ) male who initiated a [facility] Visit for COVID-19 (Coronavirus) evaluation and screening. When asked the question \"Please sign me up to receive news, health information and promotions from [facility].\", Iker responded \"No\".    When asked when his symptoms started, Iker reported that he does not have any symptoms.   He denies taking antibiotic medication in the past month and having recent facial or sinus surgery in the past 60 days.    Pertinent COVID-19 (Coronavirus) information  In the past 14 days, Iker has not worked in a congregate living setting.   He does not work or volunteer as healthcare worker or a  and does not work or volunteer in a healthcare facility.   Iker also has not lived in a congregate living setting in the past 14 days. He does not live with a healthcare worker.   Iker has had a close contact with a laboratory-confirmed COVID-19 patient in the last 14 days. Additional information about contact with COVID-19 (Coronavirus) patient as reported by the patient (free text): Living Partner. We share an apartment and transportation.   Patient reported they are living in the same household with a COVID-19 positive patient.  Patient was in an enclosed space for greater than 15 minutes with a COVID-19 patient.  Since 2019, Iker and has had upper respiratory infection (URI) or influenza-like illness. Has not been diagnosed with lab-confirmed COVID-19 test      Date(s) of previous URI or influenza-like illness (free-text):      Symptoms Iker experienced during previous URI or influenza-like illness as reported by the patient (free-text): Sore Throat        Pertinent medical history "  Iker needs a return to work/school note.   Weight: 135 lbs   Iker does not smoke or use smokeless tobacco.   Weight: 135 lbs    MEDICATIONS: estradiol oral, spironolactone oral, sertraline oral, ALLERGIES: NKDA  Clinician Response:  Dear Iker,   Based on your exposure to COVID-19 (coronavirus), we would like to test you for this virus.  1. Please call [phone] to schedule your visit. Explain that you were referred by [facility] to have a COVID-19 test. Be ready [facility].  The following will serve as your written order for this COVID Test, ordered by me, for the indication of suspected COVID [Z20.828]: The test will be ordered in [employer], our electronic health record, after you are scheduled. It will show as ordered and authorized by Tereso Ferreira MD.  Order: COVID-19 (coronavirus) PCR for ASYMPTOMATIC EXPOSURE testing from [facility].  If you know you have had close contact with someone who tested positive, you should be quarantined for 14 days after this exposure. You should stay in quarantine for the14 days even if the covid test is negative, the optimal time to test after exposure is 5-7 days from the exposure  Quarantine means   What should I do?  For safety, it's very important to follow these rules. Do this for 14 days after the date you were last exposed to the virus..  Stay home and away from others. Don't go to school or anywhere else. Generally quarantine means staying home from work but there are some exceptions to this. Please contact your workplace.   No hugging, kissing or shaking hands.  Don't let anyone visit.  Cover your mouth and nose with a mask, tissue or washcloth to avoid spreading germs.  Wash your hands and face often. Use soap and water.  What are the symptoms of COVID-19?  The most common symptoms are cough, fever and trouble breathing. Less common symptoms include headache, body aches, fatigue (feeling very tired), chills, sore throat, stuffy or runny nose, diarrhea  (loose poop), loss of taste or smell, belly pain, and nausea or vomiting (feeling sick to your stomach or throwing up).  After 14 days, if you have still don't have symptoms, you likely don't have this virus.  If you develop symptoms, follow these guidelines.  If you're normally healthy: Please start another [facility] visit to report your symptoms. Go to [email or web address].  If you have a serious health problem (like cancer, heart failure, an organ transplant or kidney disease): Call your specialty clinic. Let them know that you might have COVID-19.  2. When it's time for your COVID test:  Stay at least 6 feet away from others. (If someone will drive you to your test, stay in the backseat, as far away from the  as you can.)  Cover your mouth and nose with a mask, tissue or washcloth.  Go straight to the testing site. Don't make any stops on the way there or back.  Please note  Caregivers in these groups are at risk for severe illness due to COVID-19:  o People 65 years and older  o People who live in a nursing home or long-term care facility  o People with chronic disease (lung, heart, cancer, diabetes, kidney, liver, immunologic)  o People who have a weakened immune system, including those who:  Are in cancer treatment  Take medicine that weakens the immune system, such as corticosteroids  Had a bone marrow or organ transplant  Have an immune deficiency  Have poorly controlled HIV or AIDS  Are obese (body mass index of 40 or higher)  Smoke regularly  Where can I get more information?  [facility] -- About COVID-19: [email or web address]  CDC -- What to Do If You're Sick: www.cdc.gov/coronavirus/2019-ncov/about/steps-when-sick.html  CDC -- Ending Home Isolation: www.cdc.gov/coronavirus/2019-ncov/hcp/disposition-in-home-patients.html  CDC -- Caring for Someone: www.cdc.gov/coronavirus/2019-ncov/if-you-are-sick/care-for-someone.html  [facility] -- Interim Guidance for Hospital Discharge to Home:  www.health.[facility].[place]/diseases/coronavirus/hcp/hospdischarge.pdf  [facility] clinical trials (COVID-19 research studies): clinicalaffairs.[facility]/umn-clinical-trials  Below are the COVID-19 hotlines at the [facility] of [facility] ([facility]). Interpreters are available.  For health questions: Call 990-705-5213 or 1-817.553.4508 (7 a.m. to 7 p.m.)  For questions about schools and childcare: Call 486-745-3730 or 1-803.369.4301 (7 a.m. to 7 p.m.)    Diagnosis: Contact with and (suspected) exposure to other viral communicable diseases  Diagnosis ICD: Z20.828

## 2020-09-18 DIAGNOSIS — Z20.822 SUSPECTED 2019 NOVEL CORONAVIRUS INFECTION: Primary | ICD-10-CM

## 2020-09-18 PROCEDURE — U0003 INFECTIOUS AGENT DETECTION BY NUCLEIC ACID (DNA OR RNA); SEVERE ACUTE RESPIRATORY SYNDROME CORONAVIRUS 2 (SARS-COV-2) (CORONAVIRUS DISEASE [COVID-19]), AMPLIFIED PROBE TECHNIQUE, MAKING USE OF HIGH THROUGHPUT TECHNOLOGIES AS DESCRIBED BY CMS-2020-01-R: HCPCS | Performed by: FAMILY MEDICINE

## 2020-09-19 LAB
SARS-COV-2 RNA SPEC QL NAA+PROBE: NOT DETECTED
SPECIMEN SOURCE: NORMAL

## 2020-09-30 DIAGNOSIS — F64.9 GENDER DYSPHORIA: ICD-10-CM

## 2020-09-30 LAB
ANION GAP SERPL CALCULATED.3IONS-SCNC: 2 MMOL/L (ref 3–14)
BUN SERPL-MCNC: 18 MG/DL (ref 7–30)
CALCIUM SERPL-MCNC: 9.2 MG/DL (ref 8.5–10.1)
CHLORIDE SERPL-SCNC: 107 MMOL/L (ref 94–109)
CHOLEST SERPL-MCNC: 155 MG/DL
CO2 SERPL-SCNC: 30 MMOL/L (ref 20–32)
CREAT SERPL-MCNC: 0.71 MG/DL (ref 0.66–1.25)
ESTRADIOL SERPL-MCNC: 52 PG/ML (ref 6–50)
GFR SERPL CREATININE-BSD FRML MDRD: >90 ML/MIN/{1.73_M2}
GLUCOSE SERPL-MCNC: 90 MG/DL (ref 70–99)
HDLC SERPL-MCNC: 92 MG/DL
LDLC SERPL CALC-MCNC: 46 MG/DL
NONHDLC SERPL-MCNC: 63 MG/DL
POTASSIUM SERPL-SCNC: 4.4 MMOL/L (ref 3.4–5.3)
SODIUM SERPL-SCNC: 139 MMOL/L (ref 133–144)
TRIGL SERPL-MCNC: 83 MG/DL

## 2020-09-30 PROCEDURE — 80061 LIPID PANEL: CPT | Performed by: INTERNAL MEDICINE

## 2020-09-30 PROCEDURE — 82670 ASSAY OF TOTAL ESTRADIOL: CPT | Performed by: INTERNAL MEDICINE

## 2020-09-30 PROCEDURE — 36415 COLL VENOUS BLD VENIPUNCTURE: CPT | Performed by: INTERNAL MEDICINE

## 2020-09-30 PROCEDURE — 84403 ASSAY OF TOTAL TESTOSTERONE: CPT | Performed by: INTERNAL MEDICINE

## 2020-09-30 PROCEDURE — 80048 BASIC METABOLIC PNL TOTAL CA: CPT | Performed by: INTERNAL MEDICINE

## 2020-10-02 LAB — TESTOST SERPL-MCNC: 338 NG/DL (ref 240–950)

## 2020-10-12 ENCOUNTER — VIRTUAL VISIT (OUTPATIENT)
Dept: PSYCHOLOGY | Facility: CLINIC | Age: 20
End: 2020-10-12
Payer: COMMERCIAL

## 2020-10-12 DIAGNOSIS — F33.2 MAJOR DEPRESSIVE DISORDER, RECURRENT EPISODE, SEVERE (H): ICD-10-CM

## 2020-10-12 DIAGNOSIS — F41.1 GENERALIZED ANXIETY DISORDER: Primary | ICD-10-CM

## 2020-10-12 PROCEDURE — 90834 PSYTX W PT 45 MINUTES: CPT | Mod: TEL | Performed by: SOCIAL WORKER

## 2020-10-13 ENCOUNTER — VIRTUAL VISIT (OUTPATIENT)
Dept: FAMILY MEDICINE | Facility: CLINIC | Age: 20
End: 2020-10-13
Payer: COMMERCIAL

## 2020-10-13 DIAGNOSIS — F64.9 GENDER DYSPHORIA: ICD-10-CM

## 2020-10-13 DIAGNOSIS — F33.1 MODERATE RECURRENT MAJOR DEPRESSION (H): ICD-10-CM

## 2020-10-13 PROCEDURE — 99214 OFFICE O/P EST MOD 30 MIN: CPT | Mod: 95 | Performed by: INTERNAL MEDICINE

## 2020-10-13 RX ORDER — SPIRONOLACTONE 50 MG/1
TABLET, FILM COATED ORAL
Qty: 270 TABLET | Refills: 3 | Status: SHIPPED | OUTPATIENT
Start: 2020-10-13 | End: 2021-01-11

## 2020-10-13 RX ORDER — ESTRADIOL 2 MG/1
3 TABLET ORAL DAILY
Qty: 135 TABLET | Refills: 3 | Status: SHIPPED | OUTPATIENT
Start: 2020-10-13 | End: 2021-01-11

## 2020-10-13 NOTE — PROGRESS NOTES
"Iker Edge is a 20 year old adult who is being evaluated via a billable video visit.      The patient has been notified of following:     \"This video visit will be conducted via a call between you and your physician/provider. We have found that certain health care needs can be provided without the need for an in-person physical exam.  This service lets us provide the care you need with a video conversation.  If a prescription is necessary we can send it directly to your pharmacy.  If lab work is needed we can place an order for that and you can then stop by our lab to have the test done at a later time.    Video visits are billed at different rates depending on your insurance coverage.  Please reach out to your insurance provider with any questions.    If during the course of the call the physician/provider feels a video visit is not appropriate, you will not be charged for this service.\"    Patient has given verbal consent for Video visit? Yes  How would you like to obtain your AVS? MyChart  If you are dropped from the video visit, the video invite should be resent to: Send to e-mail at: [email or web address]  Will anyone else be joining your video visit? No    Subjective     Iker Edge is a 20 year old adult who presents today via video visit for the following health issues:    AMPARO Dong is non-binary and started on feminizing hormone therapy in June.  They have noticed some skin changes and breast growth since then.      Medication Followup of estradiol (ESTRACE) 2 MG tablet and spironolactone (ALDACTONE) 50 MG tablet    Taking Medication as prescribed: yes    Side Effects:  They have noticed some bilateral clear nipple discharge with pressure    Medication Helping Symptoms:  yes        Sertraline is working well for mood, would like to continue.      Video Start Time: 11:31 AM      Review of Systems   Constitutional, endo systems are negative, except as otherwise noted.      Objective     "       Vitals:  No vitals were obtained today due to virtual visit.    Physical Exam     GENERAL: Healthy, alert and no distress  EYES: Eyes grossly normal to inspection.  No discharge or erythema, or obvious scleral/conjunctival abnormalities.  RESP: No audible wheeze, cough, or visible cyanosis.  No visible retractions or increased work of breathing.    SKIN: Visible skin clear. No significant rash, abnormal pigmentation or lesions.  NEURO: Cranial nerves grossly intact.  Mentation and speech appropriate for age.  PSYCH: Mentation appears normal, affect normal/bright, judgement and insight intact, normal speech and appearance well-groomed.          Assessment & Plan     Gender dysphoria: non-binary gender identity    Hormone levels are not at goal, would like to get estrodiol between 100-200 and testosterone below 55.  We'll increase both estradiol and velma doses.  Recheck in 3 months.     Will monitor the nipple discharge- this has been bilateral and clear, so likely not concerning.  Advised to call if any cloudy or bloody discharge occurs or if amount increases.    - estradiol (ESTRACE) 2 MG tablet; Take 1.5 tablets (3 mg) by mouth daily  - spironolactone (ALDACTONE) 50 MG tablet; Take 75mg (1.5 pills) twice per day  - Basic metabolic panel; Future  - Estradiol; Future  - **Testosterone Free and Total FUTURE anytime; Future    Moderate recurrent major depression (H)    Stable, refills provided.     - sertraline (ZOLOFT) 50 MG tablet; Take 1 tablet (50 mg) by mouth daily        Jhon Brody MD  [facility]      Video-Visit Details    Type of service:  Video Visit    Video End Time:11:39 AM    Originating Location (pt. Location): Home    Distant Location (provider location):  [facility]     Platform used for Video Visit: App[employer]

## 2020-10-13 NOTE — PROGRESS NOTES
"                                           Progress Note    Patient Name: Iker Edge  Date: 10/12/2020       Service Type: Individual      Session Start Time: 6:00 PM  Session End Time: 6:50 PM     Session Length: 50 Minutes    Session #: 19    Attendees: Client    Service Modality:  Phone Visit    The client has been notified of the following:      \"We have found that certain health care needs can be provided without the need for a face to face visit.  This service lets us provide the care you need with a phone conversation. I will have full access to your [place] medical record during this entire phone call. I will be taking notes for your medical record. Since this is like an office visit, we will bill your insurance company for this service. There are potential benefits and risks of telephone visits (e.g. limits to patient confidentiality) that differ from in-person visits.?Confidentiality still applies for telephone services, and nobody will record the visit. It is important to be in a quiet, private space that is free of distractions (including cell phone or other devices) during the visit. If during the course of the call, I believe a telephone visit is not appropriate, you will not be charged for this service\".     Consent has been obtained for this service by the provider: Yes      Treatment Plan Last Reviewed: 9/9/2020  PHQ-9 / LISA-7 : 13/12    DATA  Interactive Complexity: No  Crisis: No       Progress Since Last Session (Related to Symptoms / Goals / Homework):   Symptoms: No change : The client reported no change in his symptoms since the previous phone visit.    Homework: Did not complete. The client reported that he did not consider contacting his primary care provider to discuss reassessing his medications.      Episode of Care Goals: No improvement - PREPARATION (Decided to change - considering how); Intervened by negotiating a change plan and determining options / strategies for behavior " change, identifying triggers, exploring social supports, and working towards setting a date to begin behavior change.     Current / Ongoing Stressors and Concerns:   The client reported that his primary stressor is managing the symptoms of depression and anxiety he has been experiencing.     Treatment Objective(s) Addressed in This Session:   The client will decrease his frequency and intensity of feeling down, depressed and hopeless.     Intervention:   Examined the client's continued symptoms of depression. Presented didactic information about depression. Obtained a commitment from the client to exercise daily. Talked at length about the client's recent stressors.        ASSESSMENT: Current Emotional / Mental Status (status of significant symptoms):   Risk status (Self / Other harm or suicidal ideation)   Client denies current fears or concerns for personal safety.   Client denies current or recent suicidal ideation or behaviors.   Client denies current or recent homicidal ideation or behaviors.   Client denies current or recent self injurious behavior or ideation.   Client denies other safety concerns.   Client reports there has been no change in risk factors since his last session.     Client reports there has been no change in protective factors since his last session.     Recommended that the client call 911 or go to the local emergency department should there be a change in any of these risk factors.     Attitude:   Cooperative    Orientation:   All   Speech    Rate / Production: Monotone     Volume:  Normal    Mood:    Depressed    Thought Content:  Clear    Thought Form:  Coherent  Logical    Insight:    Good      Medication Review:   No changes to current psychiatric medication(s).     Medication Compliance:   Yes     Changes in Health Issues:   None reported.     Chemical Use Review:   Substance Use: Chemical use reviewed. Will continue to be assessed.     Tobacco Use: No current tobacco use.       Diagnosis:  1. Generalized anxiety disorder    2. Major depressive disorder, recurrent episode, severe (H)      Collateral Reports Completed:   Not Applicable    PLAN: (Patient Tasks / Therapist Tasks / Other)  Exercise daily.        I have reviewed the note as documented above. This accurately captures the substance of my conversation with the client.  CORA Hoffmann., ATIYA.                                                         ______________________________________________________________________    Treatment Plan     Patient's Name: Iker Edge                   YOB: 2000     Date: 9/9/2020     DSM5 Diagnoses:               296.33 (F33.2) Major Depressive Disorder, Recurrent Episode, Severe               300.02 (F41.1) Generalized Anxiety Disorder     Psychosocial / Contextual Factors: The client is a [age] year old, , single male who resides with his partner in an apartment in [place], [place]. The client is employed full-time as a  at [employer] in [place], [place]. The client reported that he is attending individual therapy at this time because he has been experiencing symptoms of both depression and anxiety.     WHODAS: 28     Referral / Collaboration:  Referral to another professional/service is not indicated at this time.     Anticipated number of session or this episode of care: 12     MeasurableTreatment Goal(s) related to diagnosis / functional impairment(s)  Goal 1: The client will learn and apply skills to manage the symptoms of depression he has been experiencing.    I will know I've met my goal when I don't feel so down and depressed.       Objective #A    The client will decrease his frequency and intensity of feeling down, depressed and hopeless.  Status: Continued - Date(s): 9/9/2020      Intervention(s)  Therapist will assist and support the client in decreasing his frequency and intensity of feeling down, depressed and  "hopeless.     Objective #B  The client will improve his concentration, focus, and ability to be mindful in daily activities.   Status: Continued - Date(s): 9/9/2020      Intervention(s)  Therapist will assist and support the client in improving his concentration, focus, and ability to be mindful in daily activities.     Objective #C  The client will decrease his frequency and intensity of suicidal ideation.  Status: Continued - Date(s): 9/9/2020      Intervention(s)  Therapist will assist and support the client in decreasing his frequency and intensity of suicidal ideation.        Goal 2: The client will learn and apply skills to manage the symptoms of anxiety he has been experiencing.    I will know I've met my goal when I don't worry so much.        Objective #A  The client will use \"worry time\" each day for thirty minutes of scheduled worry and then defer obsessive or anxious thinking until the next structured \"worry time\".                          Status: Continued - Date(s): 9/9/2020      Intervention(s)  Therapist will teach and support the client in learning and using \"worry time\".     Objective #B  The client will use relaxation strategies two times per day to reduce the physical symptoms of anxiety.  Status: Continued - Date(s): 9/9/2020      Intervention(s)  Therapist will teach and support the client in learning and using relaxation strategies.     Objective #C  The client will use cognitive strategies identified in therapy to challenge anxious thoughts.                      Status: Continued - Date(s): 9/9/2020      Intervention(s)  Therapist will teach and support the client in learning and using cognitive strategies to challenge anxious thoughts.      Objective #D  The client will learn how to use his senses to ground himself in his body.                 Status: Continued - Date(s): 9/9/2020      Intervention(s)  Therapist will  and support the client in learning how to use his senses to ground " himself in his body.     Objective #E  The client will be mindful of the body sensations he is experiencing when he is feeling anxious.                        Status: Continued - Date(s): 9/9/2020      Intervention(s)  Therapist will  and support the client in being mindful of the body sensations he is experiencing when he is feeling anxious.        The client has reviewed and verbally agreed to the above plan.        Leticia Goodman M.S.GUERITA., L.I.C.S.W.              September 9, 2020

## 2020-11-03 ENCOUNTER — VIRTUAL VISIT (OUTPATIENT)
Dept: PSYCHOLOGY | Facility: CLINIC | Age: 20
End: 2020-11-03
Payer: COMMERCIAL

## 2020-11-03 DIAGNOSIS — F33.2 SEVERE EPISODE OF RECURRENT MAJOR DEPRESSIVE DISORDER, WITHOUT PSYCHOTIC FEATURES (H): Primary | ICD-10-CM

## 2020-11-03 DIAGNOSIS — F41.1 GENERALIZED ANXIETY DISORDER: ICD-10-CM

## 2020-11-03 PROCEDURE — 90834 PSYTX W PT 45 MINUTES: CPT | Mod: TEL | Performed by: SOCIAL WORKER

## 2020-11-04 NOTE — PROGRESS NOTES
"                                           Progress Note    Patient Name: Iker Edge  Date: 11/3/2020       Service Type: Individual      Session Start Time: 8:30 AM  Session End Time: 9:20 AM     Session Length: 50 Minutes    Session #: 20    Attendees: Client    Service Modality:  Phone Visit    The client has been notified of the following:      \"We have found that certain health care needs can be provided without the need for a face to face visit.  This service lets us provide the care you need with a phone conversation. I will have full access to your [place] medical record during this entire phone call. I will be taking notes for your medical record. Since this is like an office visit, we will bill your insurance company for this service. There are potential benefits and risks of telephone visits (e.g. limits to patient confidentiality) that differ from in-person visits.?Confidentiality still applies for telephone services, and nobody will record the visit. It is important to be in a quiet, private space that is free of distractions (including cell phone or other devices) during the visit. If during the course of the call, I believe a telephone visit is not appropriate, you will not be charged for this service\".     Consent has been obtained for this service by the provider: Yes      Treatment Plan Last Reviewed: 9/9/2020  PHQ-9 / LISA-7 : 13/12    DATA  Interactive Complexity: No  Crisis: No       Progress Since Last Session (Related to Symptoms / Goals / Homework):   Symptoms: Improving : The client reported experiencing decreased symptoms of depression.    Homework: Partially completed. The client reported that, although he hasn't been exercising, he has been very busy and active.      Episode of Care Goals: Minimal progress - ACTION (Actively working towards change); Intervened by reinforcing change plan / affirming steps taken.     Current / Ongoing Stressors and Concerns:   The client reported that " his primary stressor is work related stress.     Treatment Objective(s) Addressed in This Session:   The client will decrease his frequency and intensity of feeling down, depressed and hopeless.     Intervention:   Talked about the client's recent stressors. Discussed and reinforced his efforts to communicate effectively with others. Obtained a commitment from the client to be mindful of his intuition and avoid situations that he may find triggering. Examined the client's decreased symptoms of depression.        ASSESSMENT: Current Emotional / Mental Status (status of significant symptoms):   Risk status (Self / Other harm or suicidal ideation)   Client denies current fears or concerns for personal safety.   Client denies current or recent suicidal ideation or behaviors.   Client denies current or recent homicidal ideation or behaviors.   Client denies current or recent self injurious behavior or ideation.   Client denies other safety concerns.   Client reports there has been no change in risk factors since his last session.     Client reports there has been no change in protective factors since his last session.     Recommended that the client call 911 or go to the local emergency department should there be a change in any of these risk factors.     Attitude:   Cooperative    Orientation:   All   Speech    Rate / Production: Monotone     Volume:  Normal    Mood:    Mildly Depressed   Thought Content:  Clear    Thought Form:  Coherent  Logical    Insight:    Good      Medication Review:   No changes to current psychiatric medication(s).     Medication Compliance:   Yes     Changes in Health Issues:   None reported.     Chemical Use Review:   Substance Use: Chemical use reviewed. Will continue to be assessed.     Tobacco Use: No current tobacco use.      Diagnosis:  1. Severe episode of recurrent major depressive disorder, without psychotic features (H)    2. Generalized anxiety disorder      Collateral Reports  Completed:   Not Applicable    PLAN: (Patient Tasks / Therapist Tasks / Other)  Be mindful of his intuition and avoid situations that he may find triggering.         I have reviewed the note as documented above. This accurately captures the substance of my conversation with the client.  CORA Hoffmann., ATIYA.                                                         ______________________________________________________________________    Treatment Plan     Patient's Name: Iker Edge                   YOB: 2000     Date: 9/9/2020     DSM5 Diagnoses:               296.33 (F33.2) Major Depressive Disorder, Recurrent Episode, Severe               300.02 (F41.1) Generalized Anxiety Disorder     Psychosocial / Contextual Factors: The client is a [age] year old, , single male who resides with his partner in an apartment in [place], [place]. The client is employed full-time as a  at [employer] in [place], [place]. The client reported that he is attending individual therapy at this time because he has been experiencing symptoms of both depression and anxiety.     WHODAS: 28     Referral / Collaboration:  Referral to another professional/service is not indicated at this time.     Anticipated number of session or this episode of care: 12     MeasurableTreatment Goal(s) related to diagnosis / functional impairment(s)  Goal 1: The client will learn and apply skills to manage the symptoms of depression he has been experiencing.    I will know I've met my goal when I don't feel so down and depressed.       Objective #A    The client will decrease his frequency and intensity of feeling down, depressed and hopeless.  Status: Continued - Date(s): 9/9/2020      Intervention(s)  Therapist will assist and support the client in decreasing his frequency and intensity of feeling down, depressed and hopeless.     Objective #B  The client will improve his concentration, focus, and ability to  "be mindful in daily activities.   Status: Continued - Date(s): 9/9/2020      Intervention(s)  Therapist will assist and support the client in improving his concentration, focus, and ability to be mindful in daily activities.     Objective #C  The client will decrease his frequency and intensity of suicidal ideation.  Status: Continued - Date(s): 9/9/2020      Intervention(s)  Therapist will assist and support the client in decreasing his frequency and intensity of suicidal ideation.        Goal 2: The client will learn and apply skills to manage the symptoms of anxiety he has been experiencing.    I will know I've met my goal when I don't worry so much.        Objective #A  The client will use \"worry time\" each day for thirty minutes of scheduled worry and then defer obsessive or anxious thinking until the next structured \"worry time\".                          Status: Continued - Date(s): 9/9/2020      Intervention(s)  Therapist will teach and support the client in learning and using \"worry time\".     Objective #B  The client will use relaxation strategies two times per day to reduce the physical symptoms of anxiety.  Status: Continued - Date(s): 9/9/2020      Intervention(s)  Therapist will teach and support the client in learning and using relaxation strategies.     Objective #C  The client will use cognitive strategies identified in therapy to challenge anxious thoughts.                      Status: Continued - Date(s): 9/9/2020      Intervention(s)  Therapist will teach and support the client in learning and using cognitive strategies to challenge anxious thoughts.      Objective #D  The client will learn how to use his senses to ground himself in his body.                 Status: Continued - Date(s): 9/9/2020      Intervention(s)  Therapist will  and support the client in learning how to use his senses to ground himself in his body.     Objective #E  The client will be mindful of the body sensations he is " experiencing when he is feeling anxious.                        Status: Continued - Date(s): 9/9/2020      Intervention(s)  Therapist will  and support the client in being mindful of the body sensations he is experiencing when he is feeling anxious.        The client has reviewed and verbally agreed to the above plan.        JAMAAL Hoffmann.S.GUERITA., L.JD.JANA.S.W.              September 9, 2020

## 2020-11-16 ENCOUNTER — HEALTH MAINTENANCE LETTER (OUTPATIENT)
Age: 20
End: 2020-11-16

## 2020-11-17 ENCOUNTER — VIRTUAL VISIT (OUTPATIENT)
Dept: PSYCHOLOGY | Facility: CLINIC | Age: 20
End: 2020-11-17
Payer: COMMERCIAL

## 2020-11-17 DIAGNOSIS — F33.2 SEVERE EPISODE OF RECURRENT MAJOR DEPRESSIVE DISORDER, WITHOUT PSYCHOTIC FEATURES (H): Primary | ICD-10-CM

## 2020-11-17 DIAGNOSIS — F41.1 GENERALIZED ANXIETY DISORDER: ICD-10-CM

## 2020-11-17 PROCEDURE — 90834 PSYTX W PT 45 MINUTES: CPT | Mod: TEL | Performed by: SOCIAL WORKER

## 2020-11-17 NOTE — PROGRESS NOTES
"                                           Progress Note    Patient Name: Iker Edge  Date: 11/17/2020       Service Type: Individual      Session Start Time: 8:30 AM  Session End Time: 9:20 AM     Session Length: 50 Minutes    Session #: 21    Attendees: Client    Service Modality:  Phone Visit    The client has been notified of the following:      \"We have found that certain health care needs can be provided without the need for a face to face visit.  This service lets us provide the care you need with a phone conversation. I will have full access to your [facility] medical record during this entire phone call. I will be taking notes for your medical record. Since this is like an office visit, we will bill your insurance company for this service. There are potential benefits and risks of telephone visits (e.g. limits to patient confidentiality) that differ from in-person visits.?Confidentiality still applies for telephone services, and nobody will record the visit. It is important to be in a quiet, private space that is free of distractions (including cell phone or other devices) during the visit. If during the course of the call, I believe a telephone visit is not appropriate, you will not be charged for this service\".     Consent has been obtained for this service by the provider: Yes      Treatment Plan Last Reviewed: 9/9/2020  PHQ-9 / LISA-7 : 13/12    DATA  Interactive Complexity: No  Crisis: No       Progress Since Last Session (Related to Symptoms / Goals / Homework):   Symptoms: Improving : The client reported feeling very slightly less depressed since the previous phone visit.    Homework: Achieved / completed to satisfaction. The client reported that he was able to be mindful of his intuition and avoid a situation that he anticipated would be triggering.       Episode of Care Goals: Minimal progress - ACTION (Actively working towards change); Intervened by reinforcing change plan / affirming steps " taken.     Current / Ongoing Stressors and Concerns:   The client reported that his primary stressor is work related stress.     Treatment Objective(s) Addressed in This Session:   The client will decrease his frequency and intensity of feeling down, depressed and hopeless.     Intervention:   Talked about the client's recent stressors. Presented didactic information about justified guilt and unjustified guilt. Discussed and reinforced the client's efforts to communicate effectively with his partner. Obtained a commitment from the client to continue to be mindful of his intuition and avoid situations that he may find triggering.        ASSESSMENT: Current Emotional / Mental Status (status of significant symptoms):   Risk status (Self / Other harm or suicidal ideation)   Client denies current fears or concerns for personal safety.   Client denies current or recent suicidal ideation or behaviors.   Client denies current or recent homicidal ideation or behaviors.   Client denies current or recent self injurious behavior or ideation.   Client denies other safety concerns.   Client reports there has been no change in risk factors since his last session.     Client reports there has been no change in protective factors since his last session.     Recommended that the client call 911 or go to the local emergency department should there be a change in any of these risk factors.     Attitude:   Cooperative    Orientation:   All   Speech    Rate / Production: Normal/ Responsive    Volume:  Normal    Mood:    Mildly Depressed   Thought Content:  Clear    Thought Form:  Coherent  Logical    Insight:    Good      Medication Review:   No changes to current psychiatric medication(s).     Medication Compliance:   Yes     Changes in Health Issues:   None reported.     Chemical Use Review:   Substance Use: Chemical use reviewed. Will continue to be assessed.     Tobacco Use: No current tobacco use.      Diagnosis:  1. Severe episode  of recurrent major depressive disorder, without psychotic features (H)    2. Generalized anxiety disorder      Collateral Reports Completed:   Not Applicable    PLAN: (Patient Tasks / Therapist Tasks / Other)  Continue to be mindful of his intuition and avoid situations that he may find triggering.         I have reviewed the note as documented above. This accurately captures the substance of my conversation with the client.  JAMAAL Hoffmann.[email or web address]., ATIYA.                                                         ______________________________________________________________________    Treatment Plan     Patient's Name: Iker Edge                   YOB: 2000     Date: 9/9/2020     DSM5 Diagnoses:               296.33 (F33.2) Major Depressive Disorder, Recurrent Episode, Severe               300.02 (F41.1) Generalized Anxiety Disorder     Psychosocial / Contextual Factors: The client is a [age] year old, , single male who resides with his partner in an apartment in [place], [place]. The client is employed full-time as a  at [employer] in [place], [place]. The client reported that he is attending individual therapy at this time because he has been experiencing symptoms of both depression and anxiety.     WHODAS: 28     Referral / Collaboration:  Referral to another professional/service is not indicated at this time.     Anticipated number of session or this episode of care: 12     MeasurableTreatment Goal(s) related to diagnosis / functional impairment(s)  Goal 1: The client will learn and apply skills to manage the symptoms of depression he has been experiencing.    I will know I've met my goal when I don't feel so down and depressed.       Objective #A    The client will decrease his frequency and intensity of feeling down, depressed and hopeless.  Status: Continued - Date(s): 9/9/2020      Intervention(s)  Therapist will assist and support the client in decreasing his  "frequency and intensity of feeling down, depressed and hopeless.     Objective #B  The client will improve his concentration, focus, and ability to be mindful in daily activities.   Status: Continued - Date(s): 9/9/2020      Intervention(s)  Therapist will assist and support the client in improving his concentration, focus, and ability to be mindful in daily activities.     Objective #C  The client will decrease his frequency and intensity of suicidal ideation.  Status: Continued - Date(s): 9/9/2020      Intervention(s)  Therapist will assist and support the client in decreasing his frequency and intensity of suicidal ideation.        Goal 2: The client will learn and apply skills to manage the symptoms of anxiety he has been experiencing.    I will know I've met my goal when I don't worry so much.        Objective #A  The client will use \"worry time\" each day for thirty minutes of scheduled worry and then defer obsessive or anxious thinking until the next structured \"worry time\".                          Status: Continued - Date(s): 9/9/2020      Intervention(s)  Therapist will teach and support the client in learning and using \"worry time\".     Objective #B  The client will use relaxation strategies two times per day to reduce the physical symptoms of anxiety.  Status: Continued - Date(s): 9/9/2020      Intervention(s)  Therapist will teach and support the client in learning and using relaxation strategies.     Objective #C  The client will use cognitive strategies identified in therapy to challenge anxious thoughts.                      Status: Continued - Date(s): 9/9/2020      Intervention(s)  Therapist will teach and support the client in learning and using cognitive strategies to challenge anxious thoughts.      Objective #D  The client will learn how to use his senses to ground himself in his body.                 Status: Continued - Date(s): 9/9/2020      Intervention(s)  Therapist will  and support " the client in learning how to use his senses to ground himself in his body.     Objective #E  The client will be mindful of the body sensations he is experiencing when he is feeling anxious.                        Status: Continued - Date(s): 9/9/2020      Intervention(s)  Therapist will  and support the client in being mindful of the body sensations he is experiencing when he is feeling anxious.        The client has reviewed and verbally agreed to the above plan.        Leticia Goodman, M.S.W., L.I.C.S.W.              September 9, 2020

## 2020-12-02 ENCOUNTER — VIRTUAL VISIT (OUTPATIENT)
Dept: PSYCHOLOGY | Facility: CLINIC | Age: 20
End: 2020-12-02
Payer: COMMERCIAL

## 2020-12-02 DIAGNOSIS — F41.1 GENERALIZED ANXIETY DISORDER: ICD-10-CM

## 2020-12-02 DIAGNOSIS — F33.2 SEVERE EPISODE OF RECURRENT MAJOR DEPRESSIVE DISORDER, WITHOUT PSYCHOTIC FEATURES (H): Primary | ICD-10-CM

## 2020-12-02 PROCEDURE — 90834 PSYTX W PT 45 MINUTES: CPT | Mod: TEL | Performed by: SOCIAL WORKER

## 2020-12-02 NOTE — PROGRESS NOTES
"                                           Progress Note    Patient Name: Iker Edge  Date: 12/2/2020       Service Type: Individual      Session Start Time: 8:30 AM  Session End Time: 9:15 AM     Session Length: 45 Minutes    Session #: 22    Attendees: Client    Service Modality:  Phone Visit    The client has been notified of the following:      \"We have found that certain health care needs can be provided without the need for a face to face visit.  This service lets us provide the care you need with a phone conversation. I will have full access to your [facility] medical record during this entire phone call. I will be taking notes for your medical record. Since this is like an office visit, we will bill your insurance company for this service. There are potential benefits and risks of telephone visits (e.g. limits to patient confidentiality) that differ from in-person visits.?Confidentiality still applies for telephone services, and nobody will record the visit. It is important to be in a quiet, private space that is free of distractions (including cell phone or other devices) during the visit. If during the course of the call, I believe a telephone visit is not appropriate, you will not be charged for this service\".     Consent has been obtained for this service by the provider: Yes      Treatment Plan Last Reviewed: 9/9/2020  PHQ-9 / LISA-7 : 13/12    DATA  Interactive Complexity: No  Crisis: No       Progress Since Last Session (Related to Symptoms / Goals / Homework):   Symptoms: Improving : The client reported experiencing improved mood, increased energy and increased motivation.    Homework: Achieved / completed to satisfaction. The client reported that he has continued to be mindful of his intuition and avoid situations that he may find triggering.       Episode of Care Goals: Satisfactory progress - ACTION (Actively working towards change); Intervened by reinforcing change plan / affirming steps " taken.     Current / Ongoing Stressors and Concerns:   The client reported that his stress has decreased.     Treatment Objective(s) Addressed in This Session:   The client will decrease his frequency and intensity of feeling down, depressed and hopeless.     Intervention:   Examined the client's improved mood. 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 Discussed and reinforced the client's efforts to continue to be mindful of his intuition and avoid situations that he may find triggering. Obtained a commitment from the client to continue to be mindful of his intuition and avoid situations that he may find triggering.        ASSESSMENT: Current Emotional / Mental Status (status of significant symptoms):   Risk status (Self / Other harm or suicidal ideation)   Client denies current fears or concerns for personal safety.   Client denies current or recent suicidal ideation or behaviors.   Client denies current or recent homicidal ideation or behaviors.   Client denies current or recent self injurious behavior or ideation.   Client denies other safety concerns.   Client reports there has been no change in risk factors since his last session.     Client reports there has been no change in protective factors since his last session.     Recommended that the client call 911 or go to the local emergency department should there be a change in any of these risk factors.     Attitude:   Cooperative    Orientation:   All   Speech    Rate / Production: Normal/ Responsive    Volume:  Normal    Mood:    Normal   Thought Content:  Clear    Thought Form:  Coherent  Logical    Insight:    Good      Medication Review:   No changes to current psychiatric medication(s).     Medication Compliance:   Yes     Changes in Health Issues:   None reported.     Chemical Use Review:   Substance Use: Chemical use reviewed. Will continue to be assessed.     Tobacco Use: No current tobacco use.      Diagnosis:  1. Severe episode of recurrent  major depressive disorder, without psychotic features (H)    2. Generalized anxiety disorder      Collateral Reports Completed:   Not Applicable    PLAN: (Patient Tasks / Therapist Tasks / Other)  Continue to be mindful of his intuition and avoid situations that he may find triggering.         I have reviewed the note as documented above. This accurately captures the substance of my conversation with the client.  CORA Hoffmann., ATIYA.                                                         ______________________________________________________________________    Treatment Plan     Patient's Name: Iker Edge                   YOB: 2000     Date: 9/9/2020     DSM5 Diagnoses:               296.33 (F33.2) Major Depressive Disorder, Recurrent Episode, Severe               300.02 (F41.1) Generalized Anxiety Disorder     Psychosocial / Contextual Factors: The client is a [age] year old, , single male who resides with his partner in an apartment in [place], [place]. The client is employed full-time as a  at [employer] in [place], [place]. The client reported that he is attending individual therapy at this time because he has been experiencing symptoms of both depression and anxiety.     WHODAS: 28     Referral / Collaboration:  Referral to another professional/service is not indicated at this time.     Anticipated number of session or this episode of care: 12     MeasurableTreatment Goal(s) related to diagnosis / functional impairment(s)  Goal 1: The client will learn and apply skills to manage the symptoms of depression he has been experiencing.    I will know I've met my goal when I don't feel so down and depressed.       Objective #A    The client will decrease his frequency and intensity of feeling down, depressed and hopeless.  Status: Continued - Date(s): 9/9/2020      Intervention(s)  Therapist will assist and support the client in decreasing his frequency and  "intensity of feeling down, depressed and hopeless.     Objective #B  The client will improve his concentration, focus, and ability to be mindful in daily activities.   Status: Continued - Date(s): 9/9/2020      Intervention(s)  Therapist will assist and support the client in improving his concentration, focus, and ability to be mindful in daily activities.     Objective #C  The client will decrease his frequency and intensity of suicidal ideation.  Status: Continued - Date(s): 9/9/2020      Intervention(s)  Therapist will assist and support the client in decreasing his frequency and intensity of suicidal ideation.        Goal 2: The client will learn and apply skills to manage the symptoms of anxiety he has been experiencing.    I will know I've met my goal when I don't worry so much.        Objective #A  The client will use \"worry time\" each day for thirty minutes of scheduled worry and then defer obsessive or anxious thinking until the next structured \"worry time\".                          Status: Continued - Date(s): 9/9/2020      Intervention(s)  Therapist will teach and support the client in learning and using \"worry time\".     Objective #B  The client will use relaxation strategies two times per day to reduce the physical symptoms of anxiety.  Status: Continued - Date(s): 9/9/2020      Intervention(s)  Therapist will teach and support the client in learning and using relaxation strategies.     Objective #C  The client will use cognitive strategies identified in therapy to challenge anxious thoughts.                      Status: Continued - Date(s): 9/9/2020      Intervention(s)  Therapist will teach and support the client in learning and using cognitive strategies to challenge anxious thoughts.      Objective #D  The client will learn how to use his senses to ground himself in his body.                 Status: Continued - Date(s): 9/9/2020      Intervention(s)  Therapist will  and support the client in " learning how to use his senses to ground himself in his body.     Objective #E  The client will be mindful of the body sensations he is experiencing when he is feeling anxious.                        Status: Continued - Date(s): 9/9/2020      Intervention(s)  Therapist will  and support the client in being mindful of the body sensations he is experiencing when he is feeling anxious.        The client has reviewed and verbally agreed to the above plan.        Leticia Goodman M.S.W., L.I.C.S.W.              September 9, 2020

## 2020-12-30 DIAGNOSIS — F64.9 GENDER DYSPHORIA: ICD-10-CM

## 2020-12-30 LAB
ANION GAP SERPL CALCULATED.3IONS-SCNC: 3 MMOL/L (ref 3–14)
BUN SERPL-MCNC: 14 MG/DL (ref 7–30)
CALCIUM SERPL-MCNC: 9 MG/DL (ref 8.5–10.1)
CHLORIDE SERPL-SCNC: 103 MMOL/L (ref 94–109)
CO2 SERPL-SCNC: 29 MMOL/L (ref 20–32)
CREAT SERPL-MCNC: 0.71 MG/DL (ref 0.66–1.25)
ESTRADIOL SERPL-MCNC: 82 PG/ML (ref 6–50)
GFR SERPL CREATININE-BSD FRML MDRD: >90 ML/MIN/{1.73_M2}
GLUCOSE SERPL-MCNC: 94 MG/DL (ref 70–99)
POTASSIUM SERPL-SCNC: 3.5 MMOL/L (ref 3.4–5.3)
SODIUM SERPL-SCNC: 135 MMOL/L (ref 133–144)

## 2020-12-30 PROCEDURE — 82670 ASSAY OF TOTAL ESTRADIOL: CPT | Performed by: INTERNAL MEDICINE

## 2020-12-30 PROCEDURE — 84403 ASSAY OF TOTAL TESTOSTERONE: CPT | Mod: 90 | Performed by: INTERNAL MEDICINE

## 2020-12-30 PROCEDURE — 80048 BASIC METABOLIC PNL TOTAL CA: CPT | Performed by: INTERNAL MEDICINE

## 2020-12-30 PROCEDURE — 84270 ASSAY OF SEX HORMONE GLOBUL: CPT | Performed by: INTERNAL MEDICINE

## 2020-12-30 PROCEDURE — 99000 SPECIMEN HANDLING OFFICE-LAB: CPT | Performed by: INTERNAL MEDICINE

## 2020-12-30 PROCEDURE — 36415 COLL VENOUS BLD VENIPUNCTURE: CPT | Performed by: INTERNAL MEDICINE

## 2020-12-31 LAB
SHBG SERPL-SCNC: 59 NMOL/L (ref 11–80)
TESTOST FREE SERPL-MCNC: 3.5 NG/DL (ref 4.7–24.4)
TESTOST SERPL-MCNC: 267 NG/DL (ref 240–950)

## 2021-01-05 ENCOUNTER — VIRTUAL VISIT (OUTPATIENT)
Dept: PSYCHOLOGY | Facility: CLINIC | Age: 21
End: 2021-01-05
Payer: COMMERCIAL

## 2021-01-05 DIAGNOSIS — F33.2 SEVERE EPISODE OF RECURRENT MAJOR DEPRESSIVE DISORDER, WITHOUT PSYCHOTIC FEATURES (H): Primary | ICD-10-CM

## 2021-01-05 DIAGNOSIS — F41.1 GENERALIZED ANXIETY DISORDER: ICD-10-CM

## 2021-01-05 PROCEDURE — 90834 PSYTX W PT 45 MINUTES: CPT | Mod: TEL | Performed by: SOCIAL WORKER

## 2021-01-05 NOTE — PROGRESS NOTES
"                                           Progress Note    Patient Name: Iker Edge  Date: 1/5/2021       Service Type: Individual      Session Start Time: 8:30 AM  Session End Time: 9:20 AM     Session Length: 50 Minutes    Session #: 23    Attendees: Client    Service Modality:  Phone Visit    The client has been notified of the following:      \"We have found that certain health care needs can be provided without the need for a face to face visit. This service lets us provide the care you need with a phone conversation. I will have full access to your [facility] medical record during this entire phone call. I will be taking notes for your medical record. Since this is like an office visit, we will bill your insurance company for this service. There are potential benefits and risks of telephone visits (e.g. limits to patient confidentiality) that differ from in-person visits.?Confidentiality still applies for telephone services, and nobody will record the visit. It is important to be in a quiet, private space that is free of distractions (including cell phone or other devices) during the visit. If during the course of the call, I believe a telephone visit is not appropriate, you will not be charged for this service\".     Consent has been obtained for this service by the provider: Yes      Treatment Plan Last Reviewed: 1/5/2021  PHQ-9 / LISA-7 : 13/12    DATA  Interactive Complexity: No  Crisis: No       Progress Since Last Session (Related to Symptoms / Goals / Homework):   Symptoms: Worsening : The client reported experiencing increased symptoms of anxiety, most significantly related to driving.    Homework: Achieved / completed to satisfaction. The client reported that he has continued to be mindful of his intuition and avoid situations that he may find triggering.       Episode of Care Goals: No improvement - ACTION (Actively working towards change); Intervened by reinforcing change plan / affirming steps " taken.     Current / Ongoing Stressors and Concerns:   The client reported that his stress has increased.     Treatment Objective(s) Addressed in This Session:   The client will be mindful of the body sensations he is experiencing when he is feeling anxious.     Intervention:   Examined the client's increased symptoms of anxiety related to driving. Presented didactic information about emotions. Obtained a commitment from the client to be mindful of the body sensations he is experiencing when he is feeling anxious prior to driving. Talked about the client's experience of believing that he has done something wrong. Exposed the client's shame. Supported the client while he allowed himself to experience and express shame. Obtained a commitment from the client to consider the origins of the shame he experiences.       ASSESSMENT: Current Emotional / Mental Status (status of significant symptoms):   Risk status (Self / Other harm or suicidal ideation)   Client denies current fears or concerns for personal safety.   Client denies current or recent suicidal ideation or behaviors.   Client denies current or recent homicidal ideation or behaviors.   Client denies current or recent self injurious behavior or ideation.   Client denies other safety concerns.   Client reports there has been no change in risk factors since his last session.     Client reports there has been no change in protective factors since his last session.     Recommended that the client call 911 or go to the local emergency department should there be a change in any of these risk factors.     Attitude:   Cooperative    Orientation:   All   Speech    Rate / Production: Normal/ Responsive    Volume:  Normal    Mood:    Shame   Thought Content:  Clear    Thought Form:  Coherent  Logical    Insight:    Good      Medication Review:   No changes to current psychiatric medication(s).     Medication Compliance:   Yes     Changes in Health Issues:   None  reported.     Chemical Use Review:   Substance Use: Chemical use reviewed. Will continue to be assessed.     Tobacco Use: No current tobacco use.      Diagnosis:  1. Severe episode of recurrent major depressive disorder, without psychotic features (H)    2. Generalized anxiety disorder      Collateral Reports Completed:   Not Applicable    PLAN: (Patient Tasks / Therapist Tasks / Other)  Be mindful of the body sensations he is experiencing when he is feeling anxious prior to driving. Consider the origins of the shame he experiences.        I have reviewed the note as documented above. This accurately captures the substance of my conversation with the client.  Leticia Goodman, M.S.GUERITA., ATIYA.                                                         ______________________________________________________________________    Treatment Plan     Patient's Name: Iker Edge                   YOB: 2000     Date: 1/5/2021     DSM5 Diagnoses:               296.33 (F33.2) Major Depressive Disorder, Recurrent Episode, Severe               300.02 (F41.1) Generalized Anxiety Disorder     Psychosocial / Contextual Factors: The client is a [age] year old  who resides with his partner in an apartment in [place], [place]. The client is employed full-time as a  at [employer] in [place], [place]. The client reported that he is attending individual therapy at this time because he has been experiencing symptoms of both depression and anxiety.     WHODAS: 28     Referral / Collaboration:  Referral to another professional/service is not indicated at this time.     Anticipated number of session or this episode of care: 12     MeasurableTreatment Goal(s) related to diagnosis / functional impairment(s)  Goal 1: The client will learn and apply skills to manage the symptoms of depression he has been experiencing.    I will know I've met my goal when I don't feel so down and depressed.       Objective #A   "  The client will decrease his frequency and intensity of feeling down, depressed and hopeless.  Status: Continued - Date(s): 1/5/2021      Intervention(s)  Therapist will assist and support the client in decreasing his frequency and intensity of feeling down, depressed and hopeless.     Objective #B  The client will improve his concentration, focus, and ability to be mindful in daily activities.   Status: Continued - Date(s): 1/5/2021      Intervention(s)  Therapist will assist and support the client in improving his concentration, focus, and ability to be mindful in daily activities.     Objective #C  The client will decrease his frequency and intensity of suicidal ideation.  Status: Continued - Date(s): 1/5/2021      Intervention(s)  Therapist will assist and support the client in decreasing his frequency and intensity of suicidal ideation.        Goal 2: The client will learn and apply skills to manage the symptoms of anxiety he has been experiencing.    I will know I've met my goal when I don't worry so much.        Objective #A  The client will use \"worry time\" each day for thirty minutes of scheduled worry and then defer obsessive or anxious thinking until the next structured \"worry time\".                          Status: Continued - Date(s): 1/5/2021      Intervention(s)  Therapist will teach and support the client in learning and using \"worry time\".     Objective #B  The client will use relaxation strategies two times per day to reduce the physical symptoms of anxiety.  Status: Continued - Date(s): 1/5/2021      Intervention(s)  Therapist will teach and support the client in learning and using relaxation strategies.     Objective #C  The client will use cognitive strategies identified in therapy to challenge anxious thoughts.                      Status: Continued - Date(s): 1/5/2021      Intervention(s)  Therapist will teach and support the client in learning and using cognitive strategies to challenge " anxious thoughts.      Objective #D  The client will learn how to use his senses to ground himself in his body.                 Status: Continued - Date(s): 1/5/2021      Intervention(s)  Therapist will  and support the client in learning how to use his senses to ground himself in his body.     Objective #E  The client will be mindful of the body sensations he is experiencing when he is feeling anxious.                        Status: Continued - Date(s): 1/5/2021      Intervention(s)  Therapist will  and support the client in being mindful of the body sensations he is experiencing when he is feeling anxious.        The client has verbally agreed to the above plan.        Leticia Goodman, M.S.W., L.I.C.S.W.              January 5, 2021

## 2021-01-11 ENCOUNTER — VIRTUAL VISIT (OUTPATIENT)
Dept: FAMILY MEDICINE | Facility: CLINIC | Age: 21
End: 2021-01-11
Payer: COMMERCIAL

## 2021-01-11 DIAGNOSIS — F33.1 MODERATE RECURRENT MAJOR DEPRESSION (H): ICD-10-CM

## 2021-01-11 DIAGNOSIS — F64.9 GENDER DYSPHORIA: ICD-10-CM

## 2021-01-11 PROCEDURE — 99213 OFFICE O/P EST LOW 20 MIN: CPT | Mod: 95 | Performed by: INTERNAL MEDICINE

## 2021-01-11 RX ORDER — ESTRADIOL 2 MG/1
2 TABLET ORAL 2 TIMES DAILY
Qty: 180 TABLET | Refills: 3 | Status: SHIPPED | OUTPATIENT
Start: 2021-01-11 | End: 2021-11-08

## 2021-01-11 RX ORDER — SPIRONOLACTONE 100 MG/1
100 TABLET, FILM COATED ORAL 2 TIMES DAILY
Qty: 180 TABLET | Refills: 3 | Status: SHIPPED | OUTPATIENT
Start: 2021-01-11 | End: 2021-04-26

## 2021-01-11 NOTE — PROGRESS NOTES
"Iker is a 20 year old who is being evaluated via a billable video visit.      How would you like to obtain your AVS? MyChart  If the video visit is dropped, the invitation should be resent by: Send to e-mail at: [email or web address]  Will anyone else be joining your video visit? No    Video Start Time: 4:39 PM  Assessment & Plan     Gender dysphoria: non-binary gender identity    Iker is doing well on the hormones and noticing appropriate physiological changes.  Their nipple discharge has improved though is still expressible- we'll continue to monitor this.  Hormone levels are not yet at goal, so we'll adjust up both meds as below.  Recheck labs and follow-up visit in 3 months.     - estradiol (ESTRACE) 2 MG tablet; Take 1 tablet (2 mg) by mouth 2 times daily  - spironolactone (ALDACTONE) 100 MG tablet; Take 1 tablet (100 mg) by mouth 2 times daily  - Basic metabolic panel; Future  - Estradiol; Future  - **Testosterone Free and Total FUTURE anytime; Future    Moderate recurrent major depression (H)    Still doing well on med, refill sent.     - sertraline (ZOLOFT) 50 MG tablet; Take 1 tablet (50 mg) by mouth daily        Return in about 3 months (around 4/11/2021) for Lab appointment, Virtual visit (phone or video).    Jhon Brody MD  [facility]    Subjective     Iker is a 20 year old who presents to clinic today for the following health issues     HPI     Iker is non-binary and started on feminizing hormone therapy in June.  I saw them last in October: \"Hormone levels are not at goal, would like to get estrodiol between 100-200 and testosterone below 55.  We'll increase both estradiol and velma doses.  Recheck in 3 months.      Will monitor the nipple discharge- this has been bilateral and clear, so likely not concerning.  Advised to call if any cloudy or bloody discharge occurs or if amount increases.\"    Iker had labs done on 12/30- hormone levels had improved but are not yet to goal as " state above.  Iker has noted softer skin and breast development.  Iker is feeling more comfortable in general and happier.  Iker continues to have nipple discharge but not as much, no longer spontaneous and discharge is still clear when expressed and bilateral.        Medication Followup of ALDACTONE, ESTRACE    Taking Medication as prescribed: yes    Side Effects:  None    Medication Helping Symptoms:  yes       Iker also requests a sertraline refill- this is working well.        Review of Systems   Constitutional, endo systems are negative, except as otherwise noted.      Objective           Vitals:  No vitals were obtained today due to virtual visit.    Physical Exam   GENERAL: Healthy, alert and no distress  EYES: Eyes grossly normal to inspection.  No discharge or erythema, or obvious scleral/conjunctival abnormalities.  RESP: No audible wheeze, cough, or visible cyanosis.  No visible retractions or increased work of breathing.    SKIN: Visible skin clear. No significant rash, abnormal pigmentation or lesions.  NEURO: Cranial nerves grossly intact.  Mentation and speech appropriate for age.  PSYCH: Mentation appears normal, affect normal/bright, judgement and insight intact, normal speech and appearance well-groomed.          Video-Visit Details    Type of service:  Video Visit    Video End Time:4:45 PM    Originating Location (pt. Location): Home    Distant Location (provider location):  Home    Platform used for Video Visit: Edy

## 2021-01-20 ENCOUNTER — VIRTUAL VISIT (OUTPATIENT)
Dept: PSYCHOLOGY | Facility: CLINIC | Age: 21
End: 2021-01-20
Payer: COMMERCIAL

## 2021-01-20 DIAGNOSIS — F33.2 SEVERE EPISODE OF RECURRENT MAJOR DEPRESSIVE DISORDER, WITHOUT PSYCHOTIC FEATURES (H): Primary | ICD-10-CM

## 2021-01-20 DIAGNOSIS — F41.1 GENERALIZED ANXIETY DISORDER: ICD-10-CM

## 2021-01-20 PROCEDURE — 90834 PSYTX W PT 45 MINUTES: CPT | Mod: GT | Performed by: SOCIAL WORKER

## 2021-01-20 NOTE — PROGRESS NOTES
"                                           Progress Note    Patient Name: Iker Edge  Date: 1/20/2021       Service Type: Individual      Session Start Time: 8:30 AM  Session End Time: 9:20 AM     Session Length: 50 Minutes    Session #: 24    Attendees: Client    Service Modality:  Phone Visit    The client has been notified of the following:      \"We have found that certain health care needs can be provided without the need for a face to face visit. This service lets us provide the care you need with a phone conversation. I will have full access to your [facility] medical record during this entire phone call. I will be taking notes for your medical record. Since this is like an office visit, we will bill your insurance company for this service. There are potential benefits and risks of telephone visits (e.g. limits to patient confidentiality) that differ from in-person visits.?Confidentiality still applies for telephone services, and nobody will record the visit. It is important to be in a quiet, private space that is free of distractions (including cell phone or other devices) during the visit. If during the course of the call, I believe a telephone visit is not appropriate, you will not be charged for this service\".     Consent has been obtained for this service by the provider: Yes      Treatment Plan Last Reviewed: 1/5/2021  PHQ-9 / LISA-7 : 13/12    DATA  Interactive Complexity: No  Crisis: No       Progress Since Last Session (Related to Symptoms / Goals / Homework):   Symptoms: No change : The client reported no change in his symptoms since the previous phone visit.    Homework: Achieved / completed to satisfaction. The client reported that he has been mindful of the body sensations he is experiencing when he is feeling anxious prior to driving. He stated that he did consider the origins of the shame he experiences.       Episode of Care Goals: No improvement - ACTION (Actively working towards change); " Intervened by reinforcing change plan / affirming steps taken.     Current / Ongoing Stressors and Concerns:   The client reported that his stress has increased.     Treatment Objective(s) Addressed in This Session:   The client will be mindful of the body sensations he is experiencing when he is feeling anxious.     Intervention:   Talked at length about the client's recent stressors. Examined the client's experience of anger. Discussed and reinforced his efforts to experience and express his anger by setting a limit with his partner's sister. Examined the possible connection between anger and shame for the client. Obtained a commitment from the client to consider continuing to experience and express anger as needed.       ASSESSMENT: Current Emotional / Mental Status (status of significant symptoms):   Risk status (Self / Other harm or suicidal ideation)   Client denies current fears or concerns for personal safety.   Client denies current or recent suicidal ideation or behaviors.   Client denies current or recent homicidal ideation or behaviors.   Client denies current or recent self injurious behavior or ideation.   Client denies other safety concerns.   Client reports there has been a change in risk factors since his last session. He states that his partner will be moving to [place] in February.   Client reports there has been no change in protective factors since his last session.     Recommended that the client call 911 or go to the local emergency department should there be a change in any of these risk factors.     Attitude:   Cooperative    Orientation:   All   Speech    Rate / Production: Normal/ Responsive    Volume:  Normal    Mood:    Normal   Thought Content:  Clear    Thought Form:  Coherent  Logical    Insight:    Good      Medication Review:   No changes to current psychiatric medication(s).     Medication Compliance:   Yes     Changes in Health Issues:   None reported.     Chemical Use  Review:   Substance Use: Chemical use reviewed. Will continue to be assessed.     Tobacco Use: No current tobacco use.      Diagnosis:  1. Severe episode of recurrent major depressive disorder, without psychotic features (H)    2. Generalized anxiety disorder      Collateral Reports Completed:   Not Applicable    PLAN: (Patient Tasks / Therapist Tasks / Other)  Consider continuing to experience and express anger as needed.        I have reviewed the note as documented above. This accurately captures the substance of my conversation with the client.  Leticia Goodman, M.S.W., ATIYA.                                                         ______________________________________________________________________    Treatment Plan     Patient's Name: Iker Edge                   YOB: 2000     Date: 1/5/2021     DSM5 Diagnoses:               296.33 (F33.2) Major Depressive Disorder, Recurrent Episode, Severe               300.02 (F41.1) Generalized Anxiety Disorder     Psychosocial / Contextual Factors: The client is a [age] year old  who resides with his partner in an apartment in [place], [place]. The client is employed full-time as a  at [employer] in [place], [place]. The client reported that he is attending individual therapy at this time because he has been experiencing symptoms of both depression and anxiety.     WHODAS: 28     Referral / Collaboration:  Referral to another professional/service is not indicated at this time.     Anticipated number of session or this episode of care: 12     MeasurableTreatment Goal(s) related to diagnosis / functional impairment(s)  Goal 1: The client will learn and apply skills to manage the symptoms of depression he has been experiencing.    I will know I've met my goal when I don't feel so down and depressed.       Objective #A    The client will decrease his frequency and intensity of feeling down, depressed and hopeless.  Status: Continued  "- Date(s): 1/5/2021      Intervention(s)  Therapist will assist and support the client in decreasing his frequency and intensity of feeling down, depressed and hopeless.     Objective #B  The client will improve his concentration, focus, and ability to be mindful in daily activities.   Status: Continued - Date(s): 1/5/2021      Intervention(s)  Therapist will assist and support the client in improving his concentration, focus, and ability to be mindful in daily activities.     Objective #C  The client will decrease his frequency and intensity of suicidal ideation.  Status: Continued - Date(s): 1/5/2021      Intervention(s)  Therapist will assist and support the client in decreasing his frequency and intensity of suicidal ideation.        Goal 2: The client will learn and apply skills to manage the symptoms of anxiety he has been experiencing.    I will know I've met my goal when I don't worry so much.        Objective #A  The client will use \"worry time\" each day for thirty minutes of scheduled worry and then defer obsessive or anxious thinking until the next structured \"worry time\".                          Status: Continued - Date(s): 1/5/2021      Intervention(s)  Therapist will teach and support the client in learning and using \"worry time\".     Objective #B  The client will use relaxation strategies two times per day to reduce the physical symptoms of anxiety.  Status: Continued - Date(s): 1/5/2021      Intervention(s)  Therapist will teach and support the client in learning and using relaxation strategies.     Objective #C  The client will use cognitive strategies identified in therapy to challenge anxious thoughts.                      Status: Continued - Date(s): 1/5/2021      Intervention(s)  Therapist will teach and support the client in learning and using cognitive strategies to challenge anxious thoughts.      Objective #D  The client will learn how to use his senses to ground himself in his body.        "          Status: Continued - Date(s): 1/5/2021      Intervention(s)  Therapist will  and support the client in learning how to use his senses to ground himself in his body.     Objective #E  The client will be mindful of the body sensations he is experiencing when he is feeling anxious.                        Status: Continued - Date(s): 1/5/2021      Intervention(s)  Therapist will  and support the client in being mindful of the body sensations he is experiencing when he is feeling anxious.        The client has verbally agreed to the above plan.        Leticia Goodman, M.S.W., L.I.C.S.W.              January 5, 2021

## 2021-02-10 ENCOUNTER — VIRTUAL VISIT (OUTPATIENT)
Dept: PSYCHOLOGY | Facility: CLINIC | Age: 21
End: 2021-02-10
Payer: COMMERCIAL

## 2021-02-10 DIAGNOSIS — F41.1 GENERALIZED ANXIETY DISORDER: ICD-10-CM

## 2021-02-10 DIAGNOSIS — F33.2 SEVERE EPISODE OF RECURRENT MAJOR DEPRESSIVE DISORDER, WITHOUT PSYCHOTIC FEATURES (H): Primary | ICD-10-CM

## 2021-02-10 PROCEDURE — 90834 PSYTX W PT 45 MINUTES: CPT | Mod: TEL | Performed by: SOCIAL WORKER

## 2021-02-10 NOTE — PROGRESS NOTES
"                                           Progress Note    Patient Name: Iker Edge  Date: 2/10/2021       Service Type: Individual      Session Start Time: 8:30 AM  Session End Time: 9:20 AM     Session Length: 50 Minutes    Session #: 25    Attendees: Client    Service Modality:  Phone Visit    The client has been notified of the following:      \"We have found that certain health care needs can be provided without the need for a face to face visit. This service lets us provide the care you need with a phone conversation. I will have full access to your [facility] medical record during this entire phone call. I will be taking notes for your medical record. Since this is like an office visit, we will bill your insurance company for this service. There are potential benefits and risks of telephone visits (e.g. limits to patient confidentiality) that differ from in-person visits.?Confidentiality still applies for telephone services, and nobody will record the visit. It is important to be in a quiet, private space that is free of distractions (including cell phone or other devices) during the visit. If during the course of the call, I believe a telephone visit is not appropriate, you will not be charged for this service\".     Consent has been obtained for this service by the provider: Yes      Treatment Plan Last Reviewed: 1/5/2021  PHQ-9 / LISA-7 : 13/12    DATA  Interactive Complexity: No  Crisis: No       Progress Since Last Session (Related to Symptoms / Goals / Homework):   Symptoms: No change : The client reported no change in his symptoms since the previous phone visit.    Homework: Achieved / completed to satisfaction. The client reported that he has been continuing to experience and express anger as needed.      Episode of Care Goals: No improvement - ACTION (Actively working towards change); Intervened by reinforcing change plan / affirming steps taken.     Current / Ongoing Stressors and Concerns:   The " client reported that his stress has increased.     Treatment Objective(s) Addressed in This Session:   The client will use cognitive strategies identified in therapy to challenge anxious thoughts.     Intervention:   Talked at length about the client's recent stressors. Presented didactic information about the window of tolerance, hyperarousal and hypoarousal. Identified how the client may experience hyperarousal and hypoarousal. Discussed and reinforced the client's efforts to experience and express anger as needed. Obtained a commitment from the client to continue to experience and express anger as needed.      ASSESSMENT: Current Emotional / Mental Status (status of significant symptoms):   Risk status (Self / Other harm or suicidal ideation)   Client denies current fears or concerns for personal safety.   Client denies current or recent suicidal ideation or behaviors.   Client denies current or recent homicidal ideation or behaviors.   Client denies current or recent self injurious behavior or ideation.   Client denies other safety concerns.   Client reports there has been a change in risk factors since his last session. He states that his partner will be moving to [place] on 2/15/2021.   Client reports there has been no change in protective factors since his last session.     Recommended that the client call 911 or go to the local emergency department should there be a change in any of these risk factors.     Attitude:   Cooperative    Orientation:   All   Speech    Rate / Production: Normal/ Responsive    Volume:  Normal    Mood:    Normal   Thought Content:  Clear    Thought Form:  Coherent  Logical    Insight:    Good      Medication Review:   No changes to current psychiatric medication(s).     Medication Compliance:   Yes     Changes in Health Issues:   None reported.     Chemical Use Review:   Substance Use: Chemical use reviewed. Will continue to be assessed.     Tobacco Use: No current tobacco use.       Diagnosis:  1. Severe episode of recurrent major depressive disorder, without psychotic features (H)    2. Generalized anxiety disorder      Collateral Reports Completed:   Not Applicable    PLAN: (Patient Tasks / Therapist Tasks / Other)  Continue to experience and express anger as needed.        I have reviewed the note as documented above. This accurately captures the substance of my conversation with the client.  CORA Hoffmann., ATIYA.                                                         ______________________________________________________________________    Treatment Plan     Patient's Name: Iker Edge                   YOB: 2000     Date: 1/5/2021     DSM5 Diagnoses:               296.33 (F33.2) Major Depressive Disorder, Recurrent Episode, Severe               300.02 (F41.1) Generalized Anxiety Disorder     Psychosocial / Contextual Factors: The client is a [age] year old  who resides with his partner in an apartment in [place], [place]. The client is employed full-time as a  at [employer] in [place], [place]. The client reported that he is attending individual therapy at this time because he has been experiencing symptoms of both depression and anxiety.     WHODAS: 28     Referral / Collaboration:  Referral to another professional/service is not indicated at this time.     Anticipated number of session or this episode of care: 12     MeasurableTreatment Goal(s) related to diagnosis / functional impairment(s)  Goal 1: The client will learn and apply skills to manage the symptoms of depression he has been experiencing.    I will know I've met my goal when I don't feel so down and depressed.       Objective #A    The client will decrease his frequency and intensity of feeling down, depressed and hopeless.  Status: Continued - Date(s): 1/5/2021      Intervention(s)  Therapist will assist and support the client in decreasing his frequency and intensity  "of feeling down, depressed and hopeless.     Objective #B  The client will improve his concentration, focus, and ability to be mindful in daily activities.   Status: Continued - Date(s): 1/5/2021      Intervention(s)  Therapist will assist and support the client in improving his concentration, focus, and ability to be mindful in daily activities.     Objective #C  The client will decrease his frequency and intensity of suicidal ideation.  Status: Continued - Date(s): 1/5/2021      Intervention(s)  Therapist will assist and support the client in decreasing his frequency and intensity of suicidal ideation.        Goal 2: The client will learn and apply skills to manage the symptoms of anxiety he has been experiencing.    I will know I've met my goal when I don't worry so much.        Objective #A  The client will use \"worry time\" each day for thirty minutes of scheduled worry and then defer obsessive or anxious thinking until the next structured \"worry time\".                          Status: Continued - Date(s): 1/5/2021      Intervention(s)  Therapist will teach and support the client in learning and using \"worry time\".     Objective #B  The client will use relaxation strategies two times per day to reduce the physical symptoms of anxiety.  Status: Continued - Date(s): 1/5/2021      Intervention(s)  Therapist will teach and support the client in learning and using relaxation strategies.     Objective #C  The client will use cognitive strategies identified in therapy to challenge anxious thoughts.                      Status: Continued - Date(s): 1/5/2021      Intervention(s)  Therapist will teach and support the client in learning and using cognitive strategies to challenge anxious thoughts.      Objective #D  The client will learn how to use his senses to ground himself in his body.                 Status: Continued - Date(s): 1/5/2021      Intervention(s)  Therapist will  and support the client in learning how " to use his senses to ground himself in his body.     Objective #E  The client will be mindful of the body sensations he is experiencing when he is feeling anxious.                        Status: Continued - Date(s): 1/5/2021      Intervention(s)  Therapist will  and support the client in being mindful of the body sensations he is experiencing when he is feeling anxious.        The client has verbally agreed to the above plan.        Leticia Goodman M.S.W., L.I.C.S.W.              January 5, 2021

## 2021-03-04 ENCOUNTER — VIRTUAL VISIT (OUTPATIENT)
Dept: PSYCHOLOGY | Facility: CLINIC | Age: 21
End: 2021-03-04
Payer: COMMERCIAL

## 2021-03-04 DIAGNOSIS — F33.2 SEVERE EPISODE OF RECURRENT MAJOR DEPRESSIVE DISORDER, WITHOUT PSYCHOTIC FEATURES (H): Primary | ICD-10-CM

## 2021-03-04 DIAGNOSIS — F41.1 GENERALIZED ANXIETY DISORDER: ICD-10-CM

## 2021-03-04 PROCEDURE — 90834 PSYTX W PT 45 MINUTES: CPT | Mod: TEL | Performed by: SOCIAL WORKER

## 2021-03-04 NOTE — PROGRESS NOTES
"                                           Progress Note    Patient Name: Iker Edge  Date: 3/4/2021       Service Type: Individual      Session Start Time: 8:30 AM  Session End Time: 9:20 AM     Session Length: 50 Minutes    Session #: 26    Attendees: Client    Service Modality:  Phone Visit    The client has been notified of the following:      \"We have found that certain health care needs can be provided without the need for a face to face visit. This service lets us provide the care you need with a phone conversation. I will have full access to your [facility] medical record during this entire phone call. I will be taking notes for your medical record. Since this is like an office visit, we will bill your insurance company for this service. There are potential benefits and risks of telephone visits (e.g. limits to patient confidentiality) that differ from in-person visits.?Confidentiality still applies for telephone services, and nobody will record the visit. It is important to be in a quiet, private space that is free of distractions (including cell phone or other devices) during the visit. If during the course of the call, I believe a telephone visit is not appropriate, you will not be charged for this service\".     Consent has been obtained for this service by the provider: Yes      Treatment Plan Last Reviewed: 1/5/2021  PHQ-9 / LISA-7 : 13/12    DATA  Interactive Complexity: No  Crisis: No       Progress Since Last Session (Related to Symptoms / Goals / Homework):   Symptoms: Improving : The client reported experiencing decreased symptoms of depression and decreased symptoms of anxiety since the start of March.    Homework: Achieved / completed to satisfaction. The client reported that he has continued to experience and express anger and frustration as needed.      Episode of Care Goals: Minimal progress - ACTION (Actively working towards change); Intervened by reinforcing change plan / affirming steps " taken.     Current / Ongoing Stressors and Concerns:   The client reported that his stress has decreased.     Treatment Objective(s) Addressed in This Session:   The client will use cognitive strategies identified in therapy to challenge anxious thoughts.     Intervention:   Talked at length about the client's recent stressors. Examined a distressing situation for the client. Obtained a commitment from the client to further examine other experiences resulting in similar feelings.      ASSESSMENT: Current Emotional / Mental Status (status of significant symptoms):   Risk status (Self / Other harm or suicidal ideation)   Client denies current fears or concerns for personal safety.   Client denies current or recent suicidal ideation or behaviors.   Client denies current or recent homicidal ideation or behaviors.   Client denies current or recent self injurious behavior or ideation.   Client denies other safety concerns.   Client reports there has been a change in risk factors since his last session. He states that his partner moved to [place] on 2/28/2021.   Client reports there has been no change in protective factors since his last session.     Recommended that the client call 911 or go to the local emergency department should there be a change in any of these risk factors.     Attitude:   Cooperative    Orientation:   All   Speech    Rate / Production: Normal/ Responsive    Volume:  Normal    Mood:    Normal   Thought Content:  Clear    Thought Form:  Coherent  Logical    Insight:    Good      Medication Review:   No changes to current psychiatric medication(s).     Medication Compliance:   Yes     Changes in Health Issues:   None reported.     Chemical Use Review:   Substance Use: Chemical use reviewed. Will continue to be assessed.     Tobacco Use: No current tobacco use.      Diagnosis:  1. Severe episode of recurrent major depressive disorder, without psychotic features (H)    2. Generalized anxiety disorder       Collateral Reports Completed:   Not Applicable    PLAN: (Patient Tasks / Therapist Tasks / Other)  Further examine other experiences resulting in similar feelings.        I have reviewed the note as documented above. This accurately captures the substance of my conversation with the client.  CORA Hoffmann., ATIYA.                                                         ______________________________________________________________________    Treatment Plan     Patient's Name: Iker Edge                   YOB: 2000     Date: 1/5/2021     DSM5 Diagnoses:               296.33 (F33.2) Major Depressive Disorder, Recurrent Episode, Severe               300.02 (F41.1) Generalized Anxiety Disorder     Psychosocial / Contextual Factors: The client is a [age] year old  who resides with his partner in an apartment in [place], [place]. The client is employed full-time as a  at [employer] in [place], [place]. The client reported that he is attending individual therapy at this time because he has been experiencing symptoms of both depression and anxiety.     WHODAS: 28     Referral / Collaboration:  Referral to another professional/service is not indicated at this time.     Anticipated number of session or this episode of care: 12     MeasurableTreatment Goal(s) related to diagnosis / functional impairment(s)  Goal 1: The client will learn and apply skills to manage the symptoms of depression he has been experiencing.    I will know I've met my goal when I don't feel so down and depressed.       Objective #A    The client will decrease his frequency and intensity of feeling down, depressed and hopeless.  Status: Continued - Date(s): 1/5/2021      Intervention(s)  Therapist will assist and support the client in decreasing his frequency and intensity of feeling down, depressed and hopeless.     Objective #B  The client will improve his concentration, focus, and ability to be  "mindful in daily activities.   Status: Continued - Date(s): 1/5/2021      Intervention(s)  Therapist will assist and support the client in improving his concentration, focus, and ability to be mindful in daily activities.     Objective #C  The client will decrease his frequency and intensity of suicidal ideation.  Status: Continued - Date(s): 1/5/2021      Intervention(s)  Therapist will assist and support the client in decreasing his frequency and intensity of suicidal ideation.        Goal 2: The client will learn and apply skills to manage the symptoms of anxiety he has been experiencing.    I will know I've met my goal when I don't worry so much.        Objective #A  The client will use \"worry time\" each day for thirty minutes of scheduled worry and then defer obsessive or anxious thinking until the next structured \"worry time\".                          Status: Continued - Date(s): 1/5/2021      Intervention(s)  Therapist will teach and support the client in learning and using \"worry time\".     Objective #B  The client will use relaxation strategies two times per day to reduce the physical symptoms of anxiety.  Status: Continued - Date(s): 1/5/2021      Intervention(s)  Therapist will teach and support the client in learning and using relaxation strategies.     Objective #C  The client will use cognitive strategies identified in therapy to challenge anxious thoughts.                      Status: Continued - Date(s): 1/5/2021      Intervention(s)  Therapist will teach and support the client in learning and using cognitive strategies to challenge anxious thoughts.      Objective #D  The client will learn how to use his senses to ground himself in his body.                 Status: Continued - Date(s): 1/5/2021      Intervention(s)  Therapist will  and support the client in learning how to use his senses to ground himself in his body.     Objective #E  The client will be mindful of the body sensations he is " experiencing when he is feeling anxious.                        Status: Continued - Date(s): 1/5/2021      Intervention(s)  Therapist will  and support the client in being mindful of the body sensations he is experiencing when he is feeling anxious.        The client has verbally agreed to the above plan.        JAMAAL Hoffmann.S.GUERITA., CAMRYN.S.W.              January 5, 2021

## 2021-03-23 ENCOUNTER — VIRTUAL VISIT (OUTPATIENT)
Dept: PSYCHOLOGY | Facility: CLINIC | Age: 21
End: 2021-03-23
Payer: COMMERCIAL

## 2021-03-23 DIAGNOSIS — F41.1 GENERALIZED ANXIETY DISORDER: ICD-10-CM

## 2021-03-23 DIAGNOSIS — F33.2 SEVERE EPISODE OF RECURRENT MAJOR DEPRESSIVE DISORDER, WITHOUT PSYCHOTIC FEATURES (H): Primary | ICD-10-CM

## 2021-03-23 PROCEDURE — 90834 PSYTX W PT 45 MINUTES: CPT | Mod: TEL | Performed by: SOCIAL WORKER

## 2021-03-23 NOTE — PROGRESS NOTES
"                                           Progress Note    Patient Name: Iker Edge  Date: 3/23/2021       Service Type: Individual      Session Start Time: 8:30 AM  Session End Time: 9:20 AM     Session Length: 50 Minutes    Session #: 27    Attendees: Client    Service Modality:  Phone Visit    The client has been notified of the following:      \"We have found that certain health care needs can be provided without the need for a face to face visit. This service lets us provide the care you need with a phone conversation. I will have full access to your [facility] medical record during this entire phone call. I will be taking notes for your medical record. Since this is like an office visit, we will bill your insurance company for this service. There are potential benefits and risks of telephone visits (e.g. limits to patient confidentiality) that differ from in-person visits.?Confidentiality still applies for telephone services, and nobody will record the visit. It is important to be in a quiet, private space that is free of distractions (including cell phone or other devices) during the visit. If during the course of the call, I believe a telephone visit is not appropriate, you will not be charged for this service\".     Consent has been obtained for this service by the provider: Yes      Treatment Plan Last Reviewed: 1/5/2021  PHQ-9 / LISA-7 : 13/12    DATA  Interactive Complexity: No  Crisis: No       Progress Since Last Session (Related to Symptoms / Goals / Homework):   Symptoms: Worsening : The client reported experiencing increased symptoms of anxiety over the past two weeks.    Homework: Achieved / completed to satisfaction. The client reported that he has further examined other experiences from his youth which resulted in similar feelings.      Episode of Care Goals: No improvement - ACTION (Actively working towards change); Intervened by reinforcing change plan / affirming steps taken.     Current / " Ongoing Stressors and Concerns:   The client reported that his stress has increased. He stated that his partner's sister is now living with him.     Treatment Objective(s) Addressed in This Session:   The client will use cognitive strategies identified in therapy to challenge anxious thoughts.     Intervention:   Talked at length about the client's recent stressors. Examined the client's efforts to further examine other experiences from his youth which resulted in similar feelings. Talked about his efforts to validate his experience and forgive himself. Obtained a commitment from the client to continue to attempt to validate his experience and forgive himself.       ASSESSMENT: Current Emotional / Mental Status (status of significant symptoms):   Risk status (Self / Other harm or suicidal ideation)   Client denies current fears or concerns for personal safety.   Client denies current or recent suicidal ideation or behaviors.   Client denies current or recent homicidal ideation or behaviors.   Client denies current or recent self injurious behavior or ideation.   Client denies other safety concerns.   Client reports there has been a change in risk factors since his last session. He states that his partner's sister is now living with him.   Client reports there has been no change in protective factors since his last session.     Recommended that the client call 911 or go to the local emergency department should there be a change in any of these risk factors.     Attitude:   Cooperative    Orientation:   All   Speech    Rate / Production: Normal/ Responsive    Volume:  Normal    Mood:    Normal   Thought Content:  Clear    Thought Form:  Coherent  Logical    Insight:    Good      Medication Review:   No changes to current psychiatric medication(s).     Medication Compliance:   Yes     Changes in Health Issues:   None reported.     Chemical Use Review:   Substance Use: Chemical use reviewed. Will continue to be  assessed.     Tobacco Use: No current tobacco use.      Diagnosis:  1. Severe episode of recurrent major depressive disorder, without psychotic features (H)    2. Generalized anxiety disorder      Collateral Reports Completed:   Not Applicable    PLAN: (Patient Tasks / Therapist Tasks / Other)  Continue to attempt to validate his experience and forgive himself.         I have reviewed the note as documented above. This accurately captures the substance of my conversation with the client.  [email or web address] [email or web address].[email or web address]., ATIYA.                                                         ______________________________________________________________________    Treatment Plan     Patient's Name: Iker Edge                   YOB: 2000     Date: 1/5/2021     DSM5 Diagnoses:               296.33 (F33.2) Major Depressive Disorder, Recurrent Episode, Severe               300.02 (F41.1) Generalized Anxiety Disorder     Psychosocial / Contextual Factors: The client is a [age] year old  who resides with his partner in an apartment in [place], [place]. The client is employed full-time as a  at [employer] in [place], [place]. The client reported that he is attending individual therapy at this time because he has been experiencing symptoms of both depression and anxiety.     WHODAS: 28     Referral / Collaboration:  Referral to another professional/service is not indicated at this time.     Anticipated number of session or this episode of care: 12     MeasurableTreatment Goal(s) related to diagnosis / functional impairment(s)  Goal 1: The client will learn and apply skills to manage the symptoms of depression he has been experiencing.    I will know I've met my goal when I don't feel so down and depressed.       Objective #A    The client will decrease his frequency and intensity of feeling down, depressed and hopeless.  Status: Continued - Date(s): 1/5/2021      Intervention(s)  Therapist will  "assist and support the client in decreasing his frequency and intensity of feeling down, depressed and hopeless.     Objective #B  The client will improve his concentration, focus, and ability to be mindful in daily activities.   Status: Continued - Date(s): 1/5/2021      Intervention(s)  Therapist will assist and support the client in improving his concentration, focus, and ability to be mindful in daily activities.     Objective #C  The client will decrease his frequency and intensity of suicidal ideation.  Status: Continued - Date(s): 1/5/2021      Intervention(s)  Therapist will assist and support the client in decreasing his frequency and intensity of suicidal ideation.        Goal 2: The client will learn and apply skills to manage the symptoms of anxiety he has been experiencing.    I will know I've met my goal when I don't worry so much.        Objective #A  The client will use \"worry time\" each day for thirty minutes of scheduled worry and then defer obsessive or anxious thinking until the next structured \"worry time\".                          Status: Continued - Date(s): 1/5/2021      Intervention(s)  Therapist will teach and support the client in learning and using \"worry time\".     Objective #B  The client will use relaxation strategies two times per day to reduce the physical symptoms of anxiety.  Status: Continued - Date(s): 1/5/2021      Intervention(s)  Therapist will teach and support the client in learning and using relaxation strategies.     Objective #C  The client will use cognitive strategies identified in therapy to challenge anxious thoughts.                      Status: Continued - Date(s): 1/5/2021      Intervention(s)  Therapist will teach and support the client in learning and using cognitive strategies to challenge anxious thoughts.      Objective #D  The client will learn how to use his senses to ground himself in his body.                 Status: Continued - Date(s): 1/5/2021 "      Intervention(s)  Therapist will  and support the client in learning how to use his senses to ground himself in his body.     Objective #E  The client will be mindful of the body sensations he is experiencing when he is feeling anxious.                        Status: Continued - Date(s): 1/5/2021      Intervention(s)  Therapist will  and support the client in being mindful of the body sensations he is experiencing when he is feeling anxious.        The client has verbally agreed to the above plan.        Leticia Goodman, M.S.W., L.I.C.S.W.              January 5, 2021

## 2021-03-24 ENCOUNTER — VIRTUAL VISIT (OUTPATIENT)
Dept: FAMILY MEDICINE | Facility: CLINIC | Age: 21
End: 2021-03-24
Payer: COMMERCIAL

## 2021-03-24 DIAGNOSIS — J30.2 SEASONAL ALLERGIC RHINITIS, UNSPECIFIED TRIGGER: Primary | ICD-10-CM

## 2021-03-24 DIAGNOSIS — H10.13 ALLERGIC CONJUNCTIVITIS, BILATERAL: ICD-10-CM

## 2021-03-24 PROCEDURE — 99213 OFFICE O/P EST LOW 20 MIN: CPT | Mod: 95 | Performed by: FAMILY MEDICINE

## 2021-03-24 RX ORDER — FLUTICASONE PROPIONATE 50 MCG
1 SPRAY, SUSPENSION (ML) NASAL DAILY
Qty: 16 G | Refills: 3 | Status: SHIPPED | OUTPATIENT
Start: 2021-03-24 | End: 2023-04-11

## 2021-03-24 RX ORDER — OLOPATADINE HYDROCHLORIDE 2 MG/ML
1 SOLUTION/ DROPS OPHTHALMIC DAILY
Qty: 2.5 ML | Refills: 1 | Status: SHIPPED | OUTPATIENT
Start: 2021-03-24 | End: 2021-05-21

## 2021-03-24 ASSESSMENT — PATIENT HEALTH QUESTIONNAIRE - PHQ9: SUM OF ALL RESPONSES TO PHQ QUESTIONS 1-9: 9

## 2021-03-24 NOTE — PROGRESS NOTES
Jack is a 21 year old who is being evaluated via a billable video visit.      How would you like to obtain your AVS? MyChart  If the video visit is dropped, the invitation should be resent by: Send to e-mail at: [email or web address].  Will anyone else be joining your video visit? No    Video Start Time: 8:18 am    Assessment & Plan     Seasonal allergic rhinitis, unspecified trigger  Discussed course and treatment of allergic rhinitis.  Started Fluticasone nasal spray  Advised to avoid known triggers.  Return precautions discussed and given to patient.   - fluticasone (FLONASE) 50 MCG/ACT nasal spray  Dispense: 16 g; Refill: 3    Allergic conjunctivitis, bilateral  Advised use of ophthalmic drops.  Return precautions discussed and given to patient.   - olopatadine (PATADAY) 0.2 % ophthalmic solution  Dispense: 2.5 mL; Refill: 1  - fluticasone (FLONASE) 50 MCG/ACT nasal spray  Dispense: 16 g; Refill: 3  Patient Instructions   Use flonase nasal spray daily throughout your allergy season.  Use pataday to both eyes as needed for itchy and watery eyes due to allergies.    Patient Education     Allergic Rhinitis  Allergic rhinitis is an allergic reaction that affects the nose, and often the eyes. It s often known as nasal allergies. Nasal allergies are often due to things in the environment that are breathed in. Depending what you are sensitive to, nasal allergies may occur only during certain seasons, or they may occur year round. Common indoor allergens include house dust mites, mold, cockroaches, and pet dander. Outdoor allergens include pollen from trees, grasses, and weeds.    Symptoms include a drippy, stuffy, and itchy nose. They also include sneezing and red and itchy eyes. You may feel tired more often. Severe allergies may also affect your breathing and trigger a condition called asthma.    Tests can be done to see what allergens are affecting you. You may be referred to an allergy specialist for testing and  further evaluation.   Home care  Your healthcare provider may prescribe medicines to help relieve allergy symptoms. These may include oral medicines, nasal sprays, or eye drops.   Ask your provider for advice on how to stay away from substances that you are allergic to. Below are a few tips for each type of allergen.   Pet dander:    Do not have pets with fur and feathers.    If you have a pet, keep it out of your bedroom and off upholstered furniture.  Pollen:    When pollen counts are high, keep windows of your car and home closed. If possible, use an air conditioner instead.    Wear a filter mask when mowing or doing yard work.  House dust mites:    Wash bedding every week in warm water and detergent and dry on a hot setting.    Cover the mattress, box spring, and pillows with allergy covers.     If possible, sleep in a room with no carpet, curtains, or upholstered furniture.  Cockroaches:    Store food in sealed containers.    Remove garbage from the home promptly.    Fix water leaks.  Mold:    Keep humidity low by using a dehumidifier or air conditioner. Keep the dehumidifier and air conditioner clean and free of mold.    Clean moldy areas with bleach and water. Don't mix bleach with other .  In general:    Vacuum once or twice a week. If possible, use a vacuum with a high-efficiency particulate air (HEPA) filter.    Don't smoke. Stay away from cigarette smoke. Cigarette smoke is an irritant that can make symptoms worse.  Follow-up care  Follow up as advised by the healthcare provider or our staff. If you were referred to an allergy specialist, make this appointment promptly.   When to seek medical advice  Call your healthcare provider or get medical care right away if the following occur:     Coughing    Fever of 100.4 F (38 C) or higher, or as directed by your healthcare provider    Raised red bumps (hives)    Continuing symptoms, new symptoms, or worsening symptoms  Call 911  Call 911 if you have:      Trouble breathing    Severe swelling of the face or severe itching of the eyes or mouth    Wheezing or shortness of breath    Chest tightness    Dizziness or lightheadedness    Feeling of doom    Stomach pain, bloating, vomiting, or diarrhea  [employer] last reviewed this educational content on 10/1/2019    1367-1292 [employer]. All rights reserved. This information is not intended as a substitute for professional medical care. Always follow your healthcare professional's instructions.           Patient Education     Conjunctivitis, Allergic    Conjunctivitis is an irritation of the thin membrane covering the eye and the inside of the eyelid. This membrane is called the conjunctiva. The condition is often called pink eye or red eye because the eye looks pink or red. The eye may also be swollen, itchy, burning, or tearing. A watery or mucous discharge may occur. This type of conjunctivitis is not contagious.   Allergic conjunctivitis is caused by an allergen. Allergens are substances that cause the body to react with certain symptoms. Allergens that cause eye irritation include things such as house dust, smoke, or pollen in the air. This can occur seasonally, most often in the spring. Other possible allergens that may cause symptoms include cosmetics, perfumes, animal saliva or dander, chlorine in swimming pools, or contact lens.   Home care    Eye drops may be prescribed to reduce itching and redness. Use these as directed. Otherwise, over-the-counter lubricating eye drops, sometimes referred to as artificial tears, may be used.    Apply a cool compress (towel soaked in cool water) to the affected eye 3 to 4 times a day to reduce swelling and itching.    It's common to have mucus drainage during the night, causing the eyelids to become crusted by morning. Use a warm, wet cloth to wipe this away. You may also use saline irrigating solution or artificial tears to rinse away mucus in the eye. Don't  patch the eye.    You may use acetaminophen or ibuprofen to control pain, unless another medicine was prescribed. Talk with your healthcare provider if you have chronic liver or kidney disease before using these medicines. Also talk with your provider if you have ever had a stomach ulcer or digestive bleeding.    Don't wear contact lenses until your eyes have healed and all symptoms are gone.    Follow-up care  Follow up with your healthcare provider, or as advised.  When to seek medical advice  Call your healthcare provider or seek medical care right away if any of these occur:     Increased eyelid swelling    New or worsening drainage from the eye    Increasing redness around the eye    Facial swelling  [employer] last reviewed this educational content on 4/1/2020 2000-2020 [employer]. All rights reserved. This information is not intended as a substitute for professional medical care. Always follow your healthcare professional's instructions.           Patient Education     Controlling Allergens: In the Home   Even a clean home can be full of allergens. So take a moment to see what you can do to cut down on allergens in each room of your home.    Buy an air purifier with a HEPA filter. Look online or in consumer magazines for recommendations. Don't overuse vaporizers and humidifiers. They encourage mold and dust-mite growth.    Use shades or vertical blinds instead of horizontal blinds, which collect dust. Replace drapes with curtains that can be washed regularly.    Enclose mattresses, box springs, and pillows in allergy-proof casings. Bedding (sheets, mattress covers, pillowcases) should be washed weekly in hot water. Use washable blankets and quilts. Don't use feather pillows, down comforters, and wool blankets.    Prevent dust-catching clutter. Have enclosed places to keep books, toys, and clothes. Keep closet doors closed.    Use washable throw rugs wherever possible. Or have bare  floors.    Put filters over forced-air heating vents. Change the filters regularly.    Keep your car clean. Vacuum the seats and carpets regularly.    If you have air conditioning, use it instead of opening the windows in your car and home. Air conditioning filters and dehumidifies air. It reduces pollens getting into your car and home from open windows.    Keep rain gutters clean. Remove leaves and debris that can grow mold.    Check stored food for spoilage and mold growth. Clean up spills right away.    Don't let wet clothing sit and grow mold. And don't hang clothes outside to dry where they can collect airborne pollen. Dry clothing right away in a clothes dryer that's vented to the outside.    Install a fan to keep the bathroom well-ventilated.    Keep pets out of your bedroom. Keep them off upholstered furniture.    Control pests such as cockroaches and mice. Close up areas of the home where pests can enter. Don't leave open food out in the kitchen. Use bait or traps to kill pests. Get professional pest control help if the problem doesn't go away.    Try to stay away from cigarette smoke and perfume. They are not allergens. But they can make your allergy symptoms worse. They irritate your eyes, nose, throat, and lungs.     Don't do yard work or pull weeds. These and other outdoor activities increase your exposure to pollen. If that s not possible, wear a filter mask. When you re done, bathe, wash your hair, and change your clothes.  [employer] last reviewed this educational content on 4/1/2019 2000-2020 [employer]. All rights reserved. This information is not intended as a substitute for professional medical care. Always follow your healthcare professional's instructions.               Return in about 2 weeks (around 4/7/2021) for  In-clinic visit if with worsening symptoms.    Palu Pacheco MD  [facility]    Terrie Dong is a 21 year old who presents for the  following health issues     HPI     Allergies  Onset/Duration: 1 week, seasonal  Symptoms:   Nasal congestion: YES- a little  Sneezing: YES  Red, itchy eyes: YES  Progression of Symptoms: same worse  Accompanying Signs & Symptoms:  Cough: no  Wheezing: no  Rash: no  Sinus/facial pain: YES- sinus pain  Fever: no  History:   Is it seasonal: in the spring   History of Asthma: no  Has allergy testing been done: no  Precipitating factors:   Seasonal allergies  Alleviating factors:  Has used fluticasone and pataday in the past and these do help his symptoms.  Therapies tried and outcome: None        Review of Systems   Constitutional, HEENT, cardiovascular, pulmonary, GI, , musculoskeletal, neuro, skin, endocrine and psych systems are negative, except as otherwise noted.      Objective           Vitals:  No vitals were obtained today due to virtual visit.    Physical Exam   GENERAL: Healthy, alert and no distress  EYES: Eyes grossly normal to inspection.  No discharge or erythema, or obvious scleral/conjunctival abnormalities.  RESP: No audible wheeze, cough, or visible cyanosis.  No visible retractions or increased work of breathing.    SKIN: Visible skin clear. No significant rash, abnormal pigmentation or lesions.  NEURO: Cranial nerves grossly intact.  Mentation and speech appropriate for age.  PSYCH: Mentation appears normal, affect normal/bright, judgement and insight intact, normal speech and appearance well-groomed.    No results found for any visits on 03/24/21.          Video-Visit Details    Type of service:  Video Visit    Video End Time:8:23 AM    Originating Location (pt. Location): Home    Distant Location (provider location):  [facility]     Platform used for Video Visit: [employer]

## 2021-03-24 NOTE — PATIENT INSTRUCTIONS
Use flonase nasal spray daily throughout your allergy season.  Use pataday to both eyes as needed for itchy and watery eyes due to allergies.    Patient Education     Allergic Rhinitis  Allergic rhinitis is an allergic reaction that affects the nose, and often the eyes. It s often known as nasal allergies. Nasal allergies are often due to things in the environment that are breathed in. Depending what you are sensitive to, nasal allergies may occur only during certain seasons, or they may occur year round. Common indoor allergens include house dust mites, mold, cockroaches, and pet dander. Outdoor allergens include pollen from trees, grasses, and weeds.    Symptoms include a drippy, stuffy, and itchy nose. They also include sneezing and red and itchy eyes. You may feel tired more often. Severe allergies may also affect your breathing and trigger a condition called asthma.    Tests can be done to see what allergens are affecting you. You may be referred to an allergy specialist for testing and further evaluation.   Home care  Your healthcare provider may prescribe medicines to help relieve allergy symptoms. These may include oral medicines, nasal sprays, or eye drops.   Ask your provider for advice on how to stay away from substances that you are allergic to. Below are a few tips for each type of allergen.   Pet dander:    Do not have pets with fur and feathers.    If you have a pet, keep it out of your bedroom and off upholstered furniture.  Pollen:    When pollen counts are high, keep windows of your car and home closed. If possible, use an air conditioner instead.    Wear a filter mask when mowing or doing yard work.  House dust mites:    Wash bedding every week in warm water and detergent and dry on a hot setting.    Cover the mattress, box spring, and pillows with allergy covers.     If possible, sleep in a room with no carpet, curtains, or upholstered furniture.  Cockroaches:    Store food in sealed  containers.    Remove garbage from the home promptly.    Fix water leaks.  Mold:    Keep humidity low by using a dehumidifier or air conditioner. Keep the dehumidifier and air conditioner clean and free of mold.    Clean moldy areas with bleach and water. Don't mix bleach with other .  In general:    Vacuum once or twice a week. If possible, use a vacuum with a high-efficiency particulate air (HEPA) filter.    Don't smoke. Stay away from cigarette smoke. Cigarette smoke is an irritant that can make symptoms worse.  Follow-up care  Follow up as advised by the healthcare provider or our staff. If you were referred to an allergy specialist, make this appointment promptly.   When to seek medical advice  Call your healthcare provider or get medical care right away if the following occur:     Coughing    Fever of 100.4 F (38 C) or higher, or as directed by your healthcare provider    Raised red bumps (hives)    Continuing symptoms, new symptoms, or worsening symptoms  Call 911  Call 911 if you have:     Trouble breathing    Severe swelling of the face or severe itching of the eyes or mouth    Wheezing or shortness of breath    Chest tightness    Dizziness or lightheadedness    Feeling of doom    Stomach pain, bloating, vomiting, or diarrhea  [employer] last reviewed this educational content on 10/1/2019    2676-7569 [employer]. All rights reserved. This information is not intended as a substitute for professional medical care. Always follow your healthcare professional's instructions.           Patient Education     Conjunctivitis, Allergic    Conjunctivitis is an irritation of the thin membrane covering the eye and the inside of the eyelid. This membrane is called the conjunctiva. The condition is often called pink eye or red eye because the eye looks pink or red. The eye may also be swollen, itchy, burning, or tearing. A watery or mucous discharge may occur. This type of conjunctivitis is not  contagious.   Allergic conjunctivitis is caused by an allergen. Allergens are substances that cause the body to react with certain symptoms. Allergens that cause eye irritation include things such as house dust, smoke, or pollen in the air. This can occur seasonally, most often in the spring. Other possible allergens that may cause symptoms include cosmetics, perfumes, animal saliva or dander, chlorine in swimming pools, or contact lens.   Home care    Eye drops may be prescribed to reduce itching and redness. Use these as directed. Otherwise, over-the-counter lubricating eye drops, sometimes referred to as artificial tears, may be used.    Apply a cool compress (towel soaked in cool water) to the affected eye 3 to 4 times a day to reduce swelling and itching.    It's common to have mucus drainage during the night, causing the eyelids to become crusted by morning. Use a warm, wet cloth to wipe this away. You may also use saline irrigating solution or artificial tears to rinse away mucus in the eye. Don't patch the eye.    You may use acetaminophen or ibuprofen to control pain, unless another medicine was prescribed. Talk with your healthcare provider if you have chronic liver or kidney disease before using these medicines. Also talk with your provider if you have ever had a stomach ulcer or digestive bleeding.    Don't wear contact lenses until your eyes have healed and all symptoms are gone.    Follow-up care  Follow up with your healthcare provider, or as advised.  When to seek medical advice  Call your healthcare provider or seek medical care right away if any of these occur:     Increased eyelid swelling    New or worsening drainage from the eye    Increasing redness around the eye    Facial swelling  [employer] last reviewed this educational content on 4/1/2020 2000-2020 [employer]. All rights reserved. This information is not intended as a substitute for professional medical care. Always follow  your healthcare professional's instructions.           Patient Education     Controlling Allergens: In the Home   Even a clean home can be full of allergens. So take a moment to see what you can do to cut down on allergens in each room of your home.    Buy an air purifier with a HEPA filter. Look online or in consumer magazines for recommendations. Don't overuse vaporizers and humidifiers. They encourage mold and dust-mite growth.    Use shades or vertical blinds instead of horizontal blinds, which collect dust. Replace drapes with curtains that can be washed regularly.    Enclose mattresses, box springs, and pillows in allergy-proof casings. Bedding (sheets, mattress covers, pillowcases) should be washed weekly in hot water. Use washable blankets and quilts. Don't use feather pillows, down comforters, and wool blankets.    Prevent dust-catching clutter. Have enclosed places to keep books, toys, and clothes. Keep closet doors closed.    Use washable throw rugs wherever possible. Or have bare floors.    Put filters over forced-air heating vents. Change the filters regularly.    Keep your car clean. Vacuum the seats and carpets regularly.    If you have air conditioning, use it instead of opening the windows in your car and home. Air conditioning filters and dehumidifies air. It reduces pollens getting into your car and home from open windows.    Keep rain gutters clean. Remove leaves and debris that can grow mold.    Check stored food for spoilage and mold growth. Clean up spills right away.    Don't let wet clothing sit and grow mold. And don't hang clothes outside to dry where they can collect airborne pollen. Dry clothing right away in a clothes dryer that's vented to the outside.    Install a fan to keep the bathroom well-ventilated.    Keep pets out of your bedroom. Keep them off upholstered furniture.    Control pests such as cockroaches and mice. Close up areas of the home where pests can enter. Don't leave  open food out in the kitchen. Use bait or traps to kill pests. Get professional pest control help if the problem doesn't go away.    Try to stay away from cigarette smoke and perfume. They are not allergens. But they can make your allergy symptoms worse. They irritate your eyes, nose, throat, and lungs.     Don't do yard work or pull weeds. These and other outdoor activities increase your exposure to pollen. If that s not possible, wear a filter mask. When you re done, bathe, wash your hair, and change your clothes.  [employer] last reviewed this educational content on 4/1/2019 2000-2020 [employer]. All rights reserved. This information is not intended as a substitute for professional medical care. Always follow your healthcare professional's instructions.

## 2021-04-05 DIAGNOSIS — F64.9 GENDER DYSPHORIA: ICD-10-CM

## 2021-04-05 LAB
ANION GAP SERPL CALCULATED.3IONS-SCNC: 6 MMOL/L (ref 3–14)
BUN SERPL-MCNC: 17 MG/DL (ref 7–30)
CALCIUM SERPL-MCNC: 8.9 MG/DL (ref 8.5–10.1)
CHLORIDE SERPL-SCNC: 105 MMOL/L (ref 94–109)
CO2 SERPL-SCNC: 26 MMOL/L (ref 20–32)
CREAT SERPL-MCNC: 0.75 MG/DL (ref 0.66–1.25)
ESTRADIOL SERPL-MCNC: 162 PG/ML (ref 6–50)
GFR SERPL CREATININE-BSD FRML MDRD: >90 ML/MIN/{1.73_M2}
GLUCOSE SERPL-MCNC: 91 MG/DL (ref 70–99)
POTASSIUM SERPL-SCNC: 3.6 MMOL/L (ref 3.4–5.3)
SODIUM SERPL-SCNC: 137 MMOL/L (ref 133–144)

## 2021-04-05 PROCEDURE — 84403 ASSAY OF TOTAL TESTOSTERONE: CPT | Mod: 90 | Performed by: INTERNAL MEDICINE

## 2021-04-05 PROCEDURE — 82670 ASSAY OF TOTAL ESTRADIOL: CPT | Performed by: INTERNAL MEDICINE

## 2021-04-05 PROCEDURE — 80048 BASIC METABOLIC PNL TOTAL CA: CPT | Performed by: INTERNAL MEDICINE

## 2021-04-05 PROCEDURE — 84270 ASSAY OF SEX HORMONE GLOBUL: CPT | Performed by: INTERNAL MEDICINE

## 2021-04-05 PROCEDURE — 99000 SPECIMEN HANDLING OFFICE-LAB: CPT | Performed by: INTERNAL MEDICINE

## 2021-04-05 PROCEDURE — 36415 COLL VENOUS BLD VENIPUNCTURE: CPT | Performed by: INTERNAL MEDICINE

## 2021-04-06 LAB
SHBG SERPL-SCNC: 52 NMOL/L (ref 11–80)
TESTOST FREE SERPL-MCNC: 1.29 NG/DL (ref 4.7–24.4)
TESTOST SERPL-MCNC: 95 NG/DL (ref 240–950)

## 2021-04-13 ENCOUNTER — VIRTUAL VISIT (OUTPATIENT)
Dept: PSYCHOLOGY | Facility: CLINIC | Age: 21
End: 2021-04-13
Payer: COMMERCIAL

## 2021-04-13 DIAGNOSIS — F41.1 GENERALIZED ANXIETY DISORDER: Primary | ICD-10-CM

## 2021-04-13 PROCEDURE — 90834 PSYTX W PT 45 MINUTES: CPT | Mod: TEL | Performed by: SOCIAL WORKER

## 2021-04-13 NOTE — PROGRESS NOTES
"                                           Progress Note    Patient Name: Iker Edge  Date: 4/13/2021       Service Type: Individual      Session Start Time: 8:30 AM  Session End Time: 9:20 AM     Session Length: 50 Minutes    Session #: 28    Attendees: Client    Service Modality:  Phone Visit    The client has been notified of the following:      \"We have found that certain health care needs can be provided without the need for a face to face visit. This service lets us provide the care you need with a phone conversation. I will have full access to your [facility] medical record during this entire phone call. I will be taking notes for your medical record. Since this is like an office visit, we will bill your insurance company for this service. There are potential benefits and risks of telephone visits (e.g. limits to patient confidentiality) that differ from in-person visits.?Confidentiality still applies for telephone services, and nobody will record the visit. It is important to be in a quiet, private space that is free of distractions (including cell phone or other devices) during the visit. If during the course of the call, I believe a telephone visit is not appropriate, you will not be charged for this service\".     Consent has been obtained for this service by the provider: Yes      Treatment Plan Last Reviewed: 4/13/2021  PHQ-9 / LISA-7 : 13/12    DATA  Interactive Complexity: No  Crisis: No       Progress Since Last Session (Related to Symptoms / Goals / Homework):   Symptoms: Worsening : The client reported experiencing increased emotional dysregulation since the previous phone visit.    Homework: Partially completed. The client reported that he has periodically attempted to validate his experience and forgive himself.      Episode of Care Goals: No improvement - ACTION (Actively working towards change); Intervened by reinforcing change plan / affirming steps taken.     Current / Ongoing Stressors and " Concerns:   The client reported that his stress has increased.      Treatment Objective(s) Addressed in This Session:   The client will use cognitive strategies identified in therapy to challenge anxious thoughts.     Intervention:   Talked at length about the client's relationship with his partner. Created a plan to continue to discuss the client's relationship with his partner. Discussed and reinforced the client's efforts to continue to attempt to validate his experience and forgive himself.      ASSESSMENT: Current Emotional / Mental Status (status of significant symptoms):   Risk status (Self / Other harm or suicidal ideation)   Client denies current fears or concerns for personal safety.   Client denies current or recent suicidal ideation or behaviors.   Client denies current or recent homicidal ideation or behaviors.   Client denies current or recent self injurious behavior or ideation.   Client denies other safety concerns.   Client reports there has been a change in risk factors since his last session. He states that there has been recent discord in his relationship with his partner.   Client reports there has been no change in protective factors since his last session.     Recommended that the client call 911 or go to the local emergency department should there be a change in any of these risk factors.     Attitude:   Cooperative    Orientation:   All   Speech    Rate / Production: Normal/ Responsive    Volume:  Normal    Mood:    Normal   Thought Content:  Clear    Thought Form:  Coherent  Logical    Insight:    Good      Medication Review:   No changes to current psychiatric medication(s).     Medication Compliance:   Yes     Changes in Health Issues:   None reported.     Chemical Use Review:   Substance Use: Chemical use reviewed. Will continue to be assessed.     Tobacco Use: No current tobacco use.      Diagnosis:  1. Generalized anxiety disorder      Collateral Reports Completed:   Not  Applicable    PLAN: (Patient Tasks / Therapist Tasks / Other)  N/A         I have reviewed the note as documented above. This accurately captures the substance of my conversation with the client.  Leticia Goodman M.S.GUERITA., ATIYA.                                                         ______________________________________________________________________    Treatment Plan     Patient's Name: Iker Edge                   YOB: 2000     Date: 4/13/2021     DSM5 Diagnoses:               296.33 (F33.2) Major Depressive Disorder, Recurrent Episode, Severe               300.02 (F41.1) Generalized Anxiety Disorder     Psychosocial / Contextual Factors: The client is a [age] year old  who resides with his partner's sister in an apartment in [place], [place]. The client is employed full-time as a  at [employer] in [place], [place]. The client reported that he is attending individual therapy at this time because he has been experiencing symptoms of both depression and anxiety.     WHODAS: 28     Referral / Collaboration:  Referral to another professional/service is not indicated at this time.     Anticipated number of session or this episode of care: 12     MeasurableTreatment Goal(s) related to diagnosis / functional impairment(s)  Goal 1: The client will learn and apply skills to manage the symptoms of depression he has been experiencing.    I will know I've met my goal when I don't feel so down and depressed.       Objective #A    The client will decrease his frequency and intensity of feeling down, depressed and hopeless.  Status: Continued - Date(s): 4/13/2021      Intervention(s)  Therapist will assist and support the client in decreasing his frequency and intensity of feeling down, depressed and hopeless.     Objective #B  The client will improve his concentration, focus, and ability to be mindful in daily activities.   Status: Continued - Date(s): 4/13/2021  "     Intervention(s)  Therapist will assist and support the client in improving his concentration, focus, and ability to be mindful in daily activities.     Objective #C  The client will decrease his frequency and intensity of suicidal ideation.  Status: Continued - Date(s): 4/13/2021      Intervention(s)  Therapist will assist and support the client in decreasing his frequency and intensity of suicidal ideation.        Goal 2: The client will learn and apply skills to manage the symptoms of anxiety he has been experiencing.    I will know I've met my goal when I don't worry so much.        Objective #A  The client will use \"worry time\" each day for thirty minutes of scheduled worry and then defer obsessive or anxious thinking until the next structured \"worry time\".                          Status: Continued - Date(s): 4/13/2021      Intervention(s)  Therapist will teach and support the client in learning and using \"worry time\".     Objective #B  The client will use relaxation strategies two times per day to reduce the physical symptoms of anxiety.  Status: Continued - Date(s): 4/13/2021      Intervention(s)  Therapist will teach and support the client in learning and using relaxation strategies.     Objective #C  The client will use cognitive strategies identified in therapy to challenge anxious thoughts.                      Status: Continued - Date(s): 4/13/2021      Intervention(s)  Therapist will teach and support the client in learning and using cognitive strategies to challenge anxious thoughts.      Objective #D  The client will learn how to use his senses to ground himself in his body.                 Status: Continued - Date(s): 4/13/2021      Intervention(s)  Therapist will  and support the client in learning how to use his senses to ground himself in his body.     Objective #E  The client will be mindful of the body sensations he is experiencing when he is feeling anxious.                      "   Status: Continued - Date(s): 4/13/2021      Intervention(s)  Therapist will  and support the client in being mindful of the body sensations he is experiencing when he is feeling anxious.        The client has verbally agreed to the above plan.        Leticia Goodman M.S.GUERITA., LMARGARET.S.W.              April 14, 2021

## 2021-04-26 ENCOUNTER — VIRTUAL VISIT (OUTPATIENT)
Dept: FAMILY MEDICINE | Facility: CLINIC | Age: 21
End: 2021-04-26
Payer: COMMERCIAL

## 2021-04-26 VITALS — BODY MASS INDEX: 19.99 KG/M2 | HEIGHT: 69 IN | WEIGHT: 135 LBS

## 2021-04-26 DIAGNOSIS — F64.9 GENDER DYSPHORIA: Primary | ICD-10-CM

## 2021-04-26 DIAGNOSIS — F41.9 ANXIETY AND DEPRESSION: ICD-10-CM

## 2021-04-26 DIAGNOSIS — F32.A ANXIETY AND DEPRESSION: ICD-10-CM

## 2021-04-26 PROCEDURE — 99213 OFFICE O/P EST LOW 20 MIN: CPT | Mod: 95 | Performed by: INTERNAL MEDICINE

## 2021-04-26 RX ORDER — SPIRONOLACTONE 100 MG/1
150 TABLET, FILM COATED ORAL 2 TIMES DAILY
Qty: 270 TABLET | Refills: 3 | Status: SHIPPED | OUTPATIENT
Start: 2021-04-26 | End: 2021-11-08

## 2021-04-26 ASSESSMENT — ANXIETY QUESTIONNAIRES
5. BEING SO RESTLESS THAT IT IS HARD TO SIT STILL: MORE THAN HALF THE DAYS
2. NOT BEING ABLE TO STOP OR CONTROL WORRYING: SEVERAL DAYS
GAD7 TOTAL SCORE: 11
3. WORRYING TOO MUCH ABOUT DIFFERENT THINGS: SEVERAL DAYS
6. BECOMING EASILY ANNOYED OR IRRITABLE: MORE THAN HALF THE DAYS
IF YOU CHECKED OFF ANY PROBLEMS ON THIS QUESTIONNAIRE, HOW DIFFICULT HAVE THESE PROBLEMS MADE IT FOR YOU TO DO YOUR WORK, TAKE CARE OF THINGS AT HOME, OR GET ALONG WITH OTHER PEOPLE: SOMEWHAT DIFFICULT
1. FEELING NERVOUS, ANXIOUS, OR ON EDGE: MORE THAN HALF THE DAYS
7. FEELING AFRAID AS IF SOMETHING AWFUL MIGHT HAPPEN: SEVERAL DAYS

## 2021-04-26 ASSESSMENT — MIFFLIN-ST. JEOR: SCORE: 1597.74

## 2021-04-26 ASSESSMENT — PATIENT HEALTH QUESTIONNAIRE - PHQ9
5. POOR APPETITE OR OVEREATING: MORE THAN HALF THE DAYS
SUM OF ALL RESPONSES TO PHQ QUESTIONS 1-9: 10

## 2021-04-26 NOTE — PROGRESS NOTES
Iker is a 21 year old who is being evaluated via a billable video visit.      How would you like to obtain your AVS? MyChart  If the video visit is dropped, the invitation should be resent by: Text to cell phone: [phone]  Will anyone else be joining your video visit? No    Video Start Time: 3:44 PM    Assessment & Plan     Gender dysphoria: non-binary gender identity    Iker has noticed more physiological changes over the past 3 months compared to previous.  Estrogen level is in goal, testosterone still high, so we'll increase the spironolactone dose.  Continue current estradiol dose.  Recheck labs and follow-up in 3 months.     - spironolactone (ALDACTONE) 100 MG tablet; Take 1.5 tablets (150 mg) by mouth 2 times daily  - Basic metabolic panel; Future  - **Testosterone Free and Total FUTURE anytime; Future  - Estradiol; Future    Anxiety and depression    Overall worsened due to stressors.  Iker is following with a therapist, which has been somewhat helpful.  We discussed increasing sertraline, but Ikre prefers to continue current dosing for now and see if symptoms improve when stressors improve.  They can call back if they'd like to adjust dose in the future.        Return in about 3 months (around 7/26/2021) for Lab appointment followed by visit.    Jhon Brody MD  [facility]    Subjective   Iker is a 21 year old who presents for the following health issues     HPI     Gender confirmation hormone therapy follow-up:    Iker reports the past 3 months have been good, more noticeable changes- much more breast growth, no longer having ejaculation during orgasm, muscle atrophy.  No more nipple discharge, no other side effects.      Depression and Anxiety Follow-Up    How are you doing with your depression since your last visit? Worsened over the past two months and stable over the past month    How are you doing with your anxiety since your last visit?  Worsened     Are you having other  symptoms that might be associated with depression or anxiety? No    Have you had a significant life event? Housing Concerns and Transportation Concerns     Do you have any concerns with your use of alcohol or other drugs? No     Iker is following with a therapist every few weeks    Social History     Tobacco Use     Smoking status: Never Smoker     Smokeless tobacco: Never Used     Tobacco comment: no tobacco exposure   Substance Use Topics     Alcohol use: Yes     Comment: 1-2 weekly     Drug use: No     PHQ 6/18/2020 3/24/2021 4/26/2021   PHQ-9 Total Score 3 9 10   Q9: Thoughts of better off dead/self-harm past 2 weeks Not at all Not at all Not at all   F/U: Thoughts of suicide or self-harm - - -   F/U: Safety concerns - - -     LISA-7 SCORE 2/24/2020 6/18/2020 4/26/2021   Total Score 9 6 11     Last PHQ-9 4/26/2021   1.  Little interest or pleasure in doing things 1   2.  Feeling down, depressed, or hopeless 1   3.  Trouble falling or staying asleep, or sleeping too much 2   4.  Feeling tired or having little energy 1   5.  Poor appetite or overeating 3   6.  Feeling bad about yourself 1   7.  Trouble concentrating 0   8.  Moving slowly or restless 1   Q9: Thoughts of better off dead/self-harm past 2 weeks 0   PHQ-9 Total Score 10   Difficulty at work, home, or with people Somewhat difficult   In the past two weeks have you had thoughts of suicide or self harm? -   Do you have concerns about your personal safety or the safety of others? -     LISA-7  4/26/2021   1. Feeling nervous, anxious, or on edge 2   2. Not being able to stop or control worrying 1   3. Worrying too much about different things 1   4. Trouble relaxing 2   5. Being so restless that it is hard to sit still 2   6. Becoming easily annoyed or irritable 2   7. Feeling afraid, as if something awful might happen 1   LISA-7 Total Score 11   If you checked any problems, how difficult have they made it for you to do your work, take care of things at home,  or get along with other people? Somewhat difficult       Suicide Assessment Five-step Evaluation and Treatment (SAFE-T)      How many servings of fruits and vegetables do you eat daily?  2-3    On average, how many sweetened beverages do you drink each day (Examples: soda, juice, sweet tea, etc.  Do NOT count diet or artificially sweetened beverages)?   0    How many days per week do you exercise enough to make your heart beat faster? 3 or less    How many minutes a day do you exercise enough to make your heart beat faster? 30 - 60    How many days per week do you miss taking your medication? 0          Review of Systems   Constitutional, endo, psych systems are negative, except as otherwise noted.      Objective           Vitals:  No vitals were obtained today due to virtual visit.    Physical Exam   GENERAL: Healthy, alert and no distress  EYES: Eyes grossly normal to inspection.  No discharge or erythema, or obvious scleral/conjunctival abnormalities.  RESP: No audible wheeze, cough, or visible cyanosis.  No visible retractions or increased work of breathing.    SKIN: Visible skin clear. No significant rash, abnormal pigmentation or lesions.  NEURO: Cranial nerves grossly intact.  Mentation and speech appropriate for age.  PSYCH: Mentation appears normal, affect normal/bright, judgement and insight intact, normal speech and appearance well-groomed.          Video-Visit Details    Type of service:  Video Visit    Video End Time:3:50 PM    Originating Location (pt. Location): Home    Distant Location (provider location):  Home    Platform used for Video Visit: Stormpulse

## 2021-04-27 ASSESSMENT — ANXIETY QUESTIONNAIRES: GAD7 TOTAL SCORE: 11

## 2021-05-18 DIAGNOSIS — H10.13 ALLERGIC CONJUNCTIVITIS, BILATERAL: ICD-10-CM

## 2021-05-21 RX ORDER — OLOPATADINE HYDROCHLORIDE 2 MG/ML
1 SOLUTION/ DROPS OPHTHALMIC DAILY
Qty: 2.5 ML | Refills: 1 | Status: SHIPPED | OUTPATIENT
Start: 2021-05-21 | End: 2022-04-04

## 2021-05-21 NOTE — TELEPHONE ENCOUNTER
"Prescription approved per [facility] Refill Protocol.    Requested Prescriptions   Pending Prescriptions Disp Refills     olopatadine (PATADAY) 0.2 % ophthalmic solution [Pharmacy Med Name: olopatadine 0.2 % eye drops] 2.5 mL 1     Sig: Place 1 drop into both eyes daily For itchy eyes due to allrgies       Miscellaneous Opthalmic Allergy Drops Protocol Passed - 5/18/2021  8:00 AM        Passed - Patient is age 4 or older        Passed - Recent (12 mo) or future (30 days) visit within the authorizing provider's specialty     Patient has had an office visit with the authorizing provider or a provider within the authorizing providers department within the previous 12 mos or has a future within next 30 days. See \"Patient Info\" tab in inbasket, or \"Choose Columns\" in Meds & Orders section of the refill encounter.              Passed - Medication is active on med list             "

## 2021-05-25 ENCOUNTER — VIRTUAL VISIT (OUTPATIENT)
Dept: PSYCHOLOGY | Facility: CLINIC | Age: 21
End: 2021-05-25
Payer: COMMERCIAL

## 2021-05-25 DIAGNOSIS — F41.1 GENERALIZED ANXIETY DISORDER: ICD-10-CM

## 2021-05-25 DIAGNOSIS — F33.2 SEVERE EPISODE OF RECURRENT MAJOR DEPRESSIVE DISORDER, WITHOUT PSYCHOTIC FEATURES (H): Primary | ICD-10-CM

## 2021-05-25 PROCEDURE — 90834 PSYTX W PT 45 MINUTES: CPT | Mod: 95 | Performed by: SOCIAL WORKER

## 2021-05-26 NOTE — PROGRESS NOTES
"                                           Progress Note    Patient Name: Iker Edge  Date: 5/25/2021       Service Type: Individual      Session Start Time: 8:30 AM  Session End Time: 9:20 AM     Session Length: 50 Minutes    Session #: 29    Attendees: Client    Service Modality:  Phone Visit    The client has been notified of the following:      \"We have found that certain health care needs can be provided without the need for a face to face visit. This service lets us provide the care you need with a phone conversation. I will have full access to your [facility] medical record during this entire phone call. I will be taking notes for your medical record. Since this is like an office visit, we will bill your insurance company for this service. There are potential benefits and risks of telephone visits (e.g. limits to patient confidentiality) that differ from in-person visits.?Confidentiality still applies for telephone services, and nobody will record the visit. It is important to be in a quiet, private space that is free of distractions (including cell phone or other devices) during the visit. If during the course of the call, I believe a telephone visit is not appropriate, you will not be charged for this service\".     Consent has been obtained for this service by the provider: Yes      Treatment Plan Last Reviewed: 4/13/2021  PHQ-9 / LISA-7 : 13/12    DATA  Interactive Complexity: No  Crisis: No       Progress Since Last Session (Related to Symptoms / Goals / Homework):   Symptoms: Improving : The client reported experiencing slightly improved mood since his medications were adjusted.    Homework: N/A      Episode of Care Goals: Minimal progress - ACTION (Actively working towards change); Intervened by reinforcing change plan / affirming steps taken.     Current / Ongoing Stressors and Concerns:   The client reported that his stress remains high.      Treatment Objective(s) Addressed in This Session:   The " client will decrease his frequency and intensity of feeling down, depressed and hopeless.     Intervention:   Examined the client's symptoms of depression. Talked about the client's recent stressors. Obtained a commitment from the client to continue to write as a means to tolerate distress.      ASSESSMENT: Current Emotional / Mental Status (status of significant symptoms):   Risk status (Self / Other harm or suicidal ideation)   Client denies current fears or concerns for personal safety.   Client denies current or recent suicidal ideation or behaviors.   Client denies current or recent homicidal ideation or behaviors.   Client denies current or recent self injurious behavior or ideation.   Client denies other safety concerns.   Client reports there has been no change in risk factors since his last session.    Client reports there has been no change in protective factors since his last session.     Recommended that the client call 911 or go to the local emergency department should there be a change in any of these risk factors.     Attitude:   Cooperative    Orientation:   All   Speech    Rate / Production: Normal/ Responsive    Volume:  Normal    Mood:    Normal   Thought Content:  Clear    Thought Form:  Coherent  Logical    Insight:    Good      Medication Review:   Changes to psychiatric medications. Please see the updated medication list in EPIC.      Medication Compliance:   Yes     Changes in Health Issues:   None reported.     Chemical Use Review:   Substance Use: Chemical use reviewed. Will continue to be assessed.     Tobacco Use: No current tobacco use.      Diagnosis:  1. Severe episode of recurrent major depressive disorder, without psychotic features (H)    2. Generalized anxiety disorder      Collateral Reports Completed:   Not Applicable    PLAN: (Patient Tasks / Therapist Tasks / Other)  Continue to write as a means to tolerate distress.        I have reviewed the note as documented above. This  accurately captures the substance of my conversation with the client.  CORA Hoffmann., ATIYA.                                                         ______________________________________________________________________    Treatment Plan     Patient's Name: Iker Edge                   YOB: 2000     Date: 4/13/2021     DSM5 Diagnoses:               296.33 (F33.2) Major Depressive Disorder, Recurrent Episode, Severe               300.02 (F41.1) Generalized Anxiety Disorder     Psychosocial / Contextual Factors: The client is a [age] year old  who resides with his partner's sister in an apartment in [place], [place]. The client is employed full-time as a  at [employer] in [place], [place]. The client reported that he is attending individual therapy at this time because he has been experiencing symptoms of both depression and anxiety.     WHODAS: 28     Referral / Collaboration:  Referral to another professional/service is not indicated at this time.     Anticipated number of session or this episode of care: 12     MeasurableTreatment Goal(s) related to diagnosis / functional impairment(s)  Goal 1: The client will learn and apply skills to manage the symptoms of depression he has been experiencing.    I will know I've met my goal when I don't feel so down and depressed.       Objective #A    The client will decrease his frequency and intensity of feeling down, depressed and hopeless.  Status: Continued - Date(s): 4/13/2021      Intervention(s)  Therapist will assist and support the client in decreasing his frequency and intensity of feeling down, depressed and hopeless.     Objective #B  The client will improve his concentration, focus, and ability to be mindful in daily activities.   Status: Continued - Date(s): 4/13/2021      Intervention(s)  Therapist will assist and support the client in improving his concentration, focus, and ability to be mindful in daily  "activities.     Objective #C  The client will decrease his frequency and intensity of suicidal ideation.  Status: Continued - Date(s): 4/13/2021      Intervention(s)  Therapist will assist and support the client in decreasing his frequency and intensity of suicidal ideation.        Goal 2: The client will learn and apply skills to manage the symptoms of anxiety he has been experiencing.    I will know I've met my goal when I don't worry so much.        Objective #A  The client will use \"worry time\" each day for thirty minutes of scheduled worry and then defer obsessive or anxious thinking until the next structured \"worry time\".                          Status: Continued - Date(s): 4/13/2021      Intervention(s)  Therapist will teach and support the client in learning and using \"worry time\".     Objective #B  The client will use relaxation strategies two times per day to reduce the physical symptoms of anxiety.  Status: Continued - Date(s): 4/13/2021      Intervention(s)  Therapist will teach and support the client in learning and using relaxation strategies.     Objective #C  The client will use cognitive strategies identified in therapy to challenge anxious thoughts.                      Status: Continued - Date(s): 4/13/2021      Intervention(s)  Therapist will teach and support the client in learning and using cognitive strategies to challenge anxious thoughts.      Objective #D  The client will learn how to use his senses to ground himself in his body.                 Status: Continued - Date(s): 4/13/2021      Intervention(s)  Therapist will  and support the client in learning how to use his senses to ground himself in his body.     Objective #E  The client will be mindful of the body sensations he is experiencing when he is feeling anxious.                        Status: Continued - Date(s): 4/13/2021      Intervention(s)  Therapist will  and support the client in being mindful of the body sensations " he is experiencing when he is feeling anxious.        The client has verbally agreed to the above plan.        CORA Hoffmann., L.I.C.S.W.              April 14, 2021

## 2021-06-22 ENCOUNTER — VIRTUAL VISIT (OUTPATIENT)
Dept: PSYCHOLOGY | Facility: CLINIC | Age: 21
End: 2021-06-22
Payer: COMMERCIAL

## 2021-06-22 DIAGNOSIS — F33.2 SEVERE EPISODE OF RECURRENT MAJOR DEPRESSIVE DISORDER, WITHOUT PSYCHOTIC FEATURES (H): ICD-10-CM

## 2021-06-22 DIAGNOSIS — F41.1 GENERALIZED ANXIETY DISORDER: Primary | ICD-10-CM

## 2021-06-22 PROCEDURE — 90834 PSYTX W PT 45 MINUTES: CPT | Mod: 95 | Performed by: SOCIAL WORKER

## 2021-06-23 NOTE — PROGRESS NOTES
"                                           Progress Note    Patient Name: Iker Edge  Date: 6/22/2021       Service Type: Individual      Session Start Time: 8:30 AM  Session End Time: 9:20 AM     Session Length: 50 Minutes    Session #: 30    Attendees: Client    Service Modality:  Phone Visit    The client has been notified of the following:      \"We have found that certain health care needs can be provided without the need for a face to face visit. This service lets us provide the care you need with a phone conversation. I will have full access to your [facility] medical record during this entire phone call. I will be taking notes for your medical record. Since this is like an office visit, we will bill your insurance company for this service. There are potential benefits and risks of telephone visits (e.g. limits to patient confidentiality) that differ from in-person visits.?Confidentiality still applies for telephone services, and nobody will record the visit. It is important to be in a quiet, private space that is free of distractions (including cell phone or other devices) during the visit. If during the course of the call, I believe a telephone visit is not appropriate, you will not be charged for this service\".     Consent has been obtained for this service by the provider: Yes      Treatment Plan Last Reviewed: 4/13/2021  PHQ-9 / LISA-7 : 13/12    DATA  Interactive Complexity: No  Crisis: No       Progress Since Last Session (Related to Symptoms / Goals / Homework):   Symptoms: Improving : The client reported experiencing continued slightly improved mood.    Homework: Achieved / completed to satisfaction. The client reported that he has continued to write as a means to tolerate distress.      Episode of Care Goals: Minimal progress - ACTION (Actively working towards change); Intervened by reinforcing change plan / affirming steps taken.     Current / Ongoing Stressors and Concerns:   The client reported " that his stress remains high.      Treatment Objective(s) Addressed in This Session:   The client will decrease his frequency and intensity of feeling down, depressed and hopeless.     Intervention:   Examined the client's symptoms of depression. Talked about the client's recent stressors. Obtained a commitment from the client to continue to write as a means to tolerate distress.      ASSESSMENT: Current Emotional / Mental Status (status of significant symptoms):   Risk status (Self / Other harm or suicidal ideation)   Client denies current fears or concerns for personal safety.   Client denies current or recent suicidal ideation or behaviors.   Client denies current or recent homicidal ideation or behaviors.   Client denies current or recent self injurious behavior or ideation.   Client denies other safety concerns.   Client reports there has been no change in risk factors since his last session.    Client reports there has been no change in protective factors since his last session.     Recommended that the client call 911 or go to the local emergency department should there be a change in any of these risk factors.     Attitude:   Cooperative    Orientation:   All   Speech    Rate / Production: Normal/ Responsive    Volume:  Normal    Mood:    Normal   Thought Content:  Clear    Thought Form:  Coherent  Logical    Insight:    Good      Medication Review:   No changes to current psychiatric medication(s).     Medication Compliance:   Yes     Changes in Health Issues:   None reported.     Chemical Use Review:   Substance Use: Chemical use reviewed. Will continue to be assessed.     Tobacco Use: No current tobacco use.      Diagnosis:  1. Generalized anxiety disorder    2. Severe episode of recurrent major depressive disorder, without psychotic features (H)      Collateral Reports Completed:   Not Applicable    PLAN: (Patient Tasks / Therapist Tasks / Other)  Continue to write as a means to tolerate  distress.        I have reviewed the note as documented above. This accurately captures the substance of my conversation with the client.  CORA Hoffmann., ATIYA.                                                         ______________________________________________________________________    Treatment Plan     Patient's Name: Iker Edge                   YOB: 2000     Date: 4/13/2021     DSM5 Diagnoses:               296.33 (F33.2) Major Depressive Disorder, Recurrent Episode, Severe               300.02 (F41.1) Generalized Anxiety Disorder     Psychosocial / Contextual Factors: The client is a [age] year old  who resides with his partner's sister in an apartment in [place], [place]. The client is employed full-time as a  at [employer] in [place], [place]. The client reported that he is attending individual therapy at this time because he has been experiencing symptoms of both depression and anxiety.     WHODAS: 28     Referral / Collaboration:  Referral to another professional/service is not indicated at this time.     Anticipated number of session or this episode of care: 12     MeasurableTreatment Goal(s) related to diagnosis / functional impairment(s)  Goal 1: The client will learn and apply skills to manage the symptoms of depression he has been experiencing.    I will know I've met my goal when I don't feel so down and depressed.       Objective #A    The client will decrease his frequency and intensity of feeling down, depressed and hopeless.  Status: Continued - Date(s): 4/13/2021      Intervention(s)  Therapist will assist and support the client in decreasing his frequency and intensity of feeling down, depressed and hopeless.     Objective #B  The client will improve his concentration, focus, and ability to be mindful in daily activities.   Status: Continued - Date(s): 4/13/2021      Intervention(s)  Therapist will assist and support the client in  "improving his concentration, focus, and ability to be mindful in daily activities.     Objective #C  The client will decrease his frequency and intensity of suicidal ideation.  Status: Continued - Date(s): 4/13/2021      Intervention(s)  Therapist will assist and support the client in decreasing his frequency and intensity of suicidal ideation.        Goal 2: The client will learn and apply skills to manage the symptoms of anxiety he has been experiencing.    I will know I've met my goal when I don't worry so much.        Objective #A  The client will use \"worry time\" each day for thirty minutes of scheduled worry and then defer obsessive or anxious thinking until the next structured \"worry time\".                          Status: Continued - Date(s): 4/13/2021      Intervention(s)  Therapist will teach and support the client in learning and using \"worry time\".     Objective #B  The client will use relaxation strategies two times per day to reduce the physical symptoms of anxiety.  Status: Continued - Date(s): 4/13/2021      Intervention(s)  Therapist will teach and support the client in learning and using relaxation strategies.     Objective #C  The client will use cognitive strategies identified in therapy to challenge anxious thoughts.                      Status: Continued - Date(s): 4/13/2021      Intervention(s)  Therapist will teach and support the client in learning and using cognitive strategies to challenge anxious thoughts.      Objective #D  The client will learn how to use his senses to ground himself in his body.                 Status: Continued - Date(s): 4/13/2021      Intervention(s)  Therapist will  and support the client in learning how to use his senses to ground himself in his body.     Objective #E  The client will be mindful of the body sensations he is experiencing when he is feeling anxious.                        Status: Continued - Date(s): 4/13/2021      Intervention(s)  Therapist " will  and support the client in being mindful of the body sensations he is experiencing when he is feeling anxious.        The client has verbally agreed to the above plan.        Leticia Goodman M.S.GUERITA., L.I.C.S.W.              April 14, 2021

## 2021-07-13 ENCOUNTER — LAB (OUTPATIENT)
Dept: LAB | Facility: CLINIC | Age: 21
End: 2021-07-13
Payer: COMMERCIAL

## 2021-07-13 DIAGNOSIS — F64.9 GENDER DYSPHORIA: ICD-10-CM

## 2021-07-13 LAB
ANION GAP SERPL CALCULATED.3IONS-SCNC: 13 MMOL/L (ref 5–18)
BUN SERPL-MCNC: 14 MG/DL (ref 8–22)
CALCIUM SERPL-MCNC: 9.3 MG/DL (ref 8.5–10.5)
CHLORIDE BLD-SCNC: 103 MMOL/L (ref 98–107)
CO2 SERPL-SCNC: 20 MMOL/L (ref 22–31)
CREAT SERPL-MCNC: 0.72 MG/DL (ref 0.6–1.3)
ESTRADIOL SERPL-MCNC: 57 PG/ML
GFR SERPL CREATININE-BSD FRML MDRD: >90 ML/MIN/1.73M2
GLUCOSE BLD-MCNC: 87 MG/DL (ref 70–125)
POTASSIUM BLD-SCNC: 4.9 MMOL/L (ref 3.5–5)
SODIUM SERPL-SCNC: 136 MMOL/L (ref 136–145)

## 2021-07-13 PROCEDURE — 84270 ASSAY OF SEX HORMONE GLOBUL: CPT

## 2021-07-13 PROCEDURE — 36415 COLL VENOUS BLD VENIPUNCTURE: CPT

## 2021-07-13 PROCEDURE — 84403 ASSAY OF TOTAL TESTOSTERONE: CPT

## 2021-07-13 PROCEDURE — 82670 ASSAY OF TOTAL ESTRADIOL: CPT

## 2021-07-13 PROCEDURE — 80048 BASIC METABOLIC PNL TOTAL CA: CPT

## 2021-07-20 LAB
SHBG SERPL-SCNC: 38 NMOL/L (ref 11–135)
TESTOST FREE SERPL-MCNC: 0.25 NG/DL
TESTOST SERPL-MCNC: 15 NG/DL (ref 8–950)

## 2021-07-26 ENCOUNTER — VIRTUAL VISIT (OUTPATIENT)
Dept: FAMILY MEDICINE | Facility: CLINIC | Age: 21
End: 2021-07-26
Payer: COMMERCIAL

## 2021-07-26 DIAGNOSIS — F33.1 MODERATE RECURRENT MAJOR DEPRESSION (H): ICD-10-CM

## 2021-07-26 DIAGNOSIS — F64.9 GENDER DYSPHORIA: Primary | ICD-10-CM

## 2021-07-26 PROCEDURE — 99213 OFFICE O/P EST LOW 20 MIN: CPT | Mod: 95 | Performed by: INTERNAL MEDICINE

## 2021-07-26 NOTE — PROGRESS NOTES
"Brit is a 21 year old who is being evaluated via a billable video visit.      How would you like to obtain your AVS? MyChart  If the video visit is dropped, the invitation should be resent by: Send to e-mail at: [email or web address]  Will anyone else be joining your video visit? No    Video Start Time: 5:44 PM    Assessment & Plan     Gender dysphoria    Brit has been doing well since last visit with ongoing physiological changes with breast development and softening of the skin.  They have noticed decreased libido- certainly could be related to the HRT but also could be related to the increased sertraline dose.  Discussed we could try lowering spironolactone or the sertraline, but Brit prefers to continue with current dosing at this time.  Testosterone level is at goal of <55, but estrogen level was on the low side with a level of 57 (goal 100-200).  Estrogen was at goal in April and we did not change anything with the dosing, so unclear why this dropped by down.  We discussed increasing the estradiol but decided to hold off on that for now, continue current dosing, and recheck levels again in 3 months.  If still low at that point, we'll increase med.      - Testosterone, total; Future  - Estradiol; Future    Moderate recurrent major depression (H)    Much improved with increased sertraline dose, will continue current dosing.         Return in about 3 months (around 10/26/2021) for lab visit followed by virtual visit 1-2 weeks later.    Jhon Brody MD  [facility]    Subjective   Brit is a 21 year old who presents for the following health issues     HPI     Chief Complaint   Patient presents with     Results     Discuss lab results from 7/13/2021 (testosterone and estradiol)       I saw Brit last on 4/26: patient \"has noticed more physiological changes over the past 3 months compared to previous.  Estrogen level is in goal, testosterone still high, so we'll increase the spironolactone dose.  " "Continue current estradiol dose.  Recheck labs and follow-up in 3 months.\"    Recent labs showed testosterone within goal but estrogen level on the low side.  BMP was good.     Today, Brit reports continuing breast changes and softer skin since last visit.  Brit reports reduced libido.      We also increased their sertraline to 100mg daily on 5/5, and this did help out with mood a lot.         Review of Systems   Constitutional, endo systems are negative, except as otherwise noted.      Objective           Vitals:  No vitals were obtained today due to virtual visit.    Physical Exam   GENERAL: Healthy, alert and no distress  EYES: Eyes grossly normal to inspection.  No discharge or erythema, or obvious scleral/conjunctival abnormalities.  RESP: No audible wheeze, cough, or visible cyanosis.  No visible retractions or increased work of breathing.    SKIN: Visible skin clear. No significant rash, abnormal pigmentation or lesions.  NEURO: Cranial nerves grossly intact.  Mentation and speech appropriate for age.  PSYCH: Mentation appears normal, affect normal/bright, judgement and insight intact, normal speech and appearance well-groomed.            Video-Visit Details    Type of service:  Video Visit    Video End Time:5:49 PM    Originating Location (pt. Location): Home    Distant Location (provider location):  Home    Platform used for Video Visit: Edy    "

## 2021-08-10 ENCOUNTER — VIRTUAL VISIT (OUTPATIENT)
Dept: PSYCHOLOGY | Facility: CLINIC | Age: 21
End: 2021-08-10
Payer: COMMERCIAL

## 2021-08-10 DIAGNOSIS — F33.2 SEVERE EPISODE OF RECURRENT MAJOR DEPRESSIVE DISORDER, WITHOUT PSYCHOTIC FEATURES (H): ICD-10-CM

## 2021-08-10 DIAGNOSIS — F41.1 GENERALIZED ANXIETY DISORDER: Primary | ICD-10-CM

## 2021-08-10 PROCEDURE — 90834 PSYTX W PT 45 MINUTES: CPT | Mod: 95 | Performed by: SOCIAL WORKER

## 2021-08-10 NOTE — PROGRESS NOTES
"                                           Progress Note    Patient Name: Brit Edge  Date: 8/10/2021       Service Type: Individual      Session Start Time: 8:30 AM  Session End Time: 9:20 AM     Session Length: 50 Minutes    Session #: 31    Attendees: Client    Service Modality:  Phone Visit    The client has been notified of the following:      \"We have found that certain health care needs can be provided without the need for a face to face visit. This service lets us provide the care you need with a phone conversation. I will have full access to your [facility] medical record during this entire phone call. I will be taking notes for your medical record. Since this is like an office visit, we will bill your insurance company for this service. There are potential benefits and risks of telephone visits (e.g. limits to patient confidentiality) that differ from in-person visits.?Confidentiality still applies for telephone services, and nobody will record the visit. It is important to be in a quiet, private space that is free of distractions (including cell phone or other devices) during the visit. If during the course of the call, I believe a telephone visit is not appropriate, you will not be charged for this service\".     Consent has been obtained for this service by the provider: Yes      Treatment Plan Last Reviewed: 8/10/2021  PHQ-9 / LISA-7 : 13/12    DATA  Interactive Complexity: No  Crisis: No       Progress Since Last Session (Related to Symptoms / Goals / Homework):   Symptoms: Improving : The client reported experiencing continued improved mood.    Homework: Achieved / completed to satisfaction. The client reported that she has continued to write as a means to tolerate distress.      Episode of Care Goals: Satisfactory progress - ACTION (Actively working towards change); Intervened by reinforcing change plan / affirming steps taken.     Current / Ongoing Stressors and Concerns:   The client reported that " her stress has decreased.      Treatment Objective(s) Addressed in This Session:   The client will decrease his frequency and intensity of feeling down, depressed and hopeless.     Intervention:   Talked about the client's recent stressors. Examined the client's improved mood. Obtained a commitment from the client to continue to write as a means to tolerate distress.      ASSESSMENT: Current Emotional / Mental Status (status of significant symptoms):   Risk status (Self / Other harm or suicidal ideation)   Client denies current fears or concerns for personal safety.   Client denies current or recent suicidal ideation or behaviors.   Client denies current or recent homicidal ideation or behaviors.   Client denies current or recent self injurious behavior or ideation.   Client denies other safety concerns.   Client reports there has been no change in risk factors since her last session.    Client reports there has been a change in protective factors since her last session. She states that she is working fewer hours at work.   Recommended that the client call 911 or go to the local emergency department should there be a change in any of these risk factors.     Attitude:   Cooperative    Orientation:   All   Speech    Rate / Production: Normal/ Responsive    Volume:  Normal    Mood:    Normal   Thought Content:  Clear    Thought Form:  Coherent  Logical    Insight:    Good      Medication Review:   No changes to current psychiatric medication(s).     Medication Compliance:   Yes     Changes in Health Issues:   None reported.     Chemical Use Review:   Substance Use: Chemical use reviewed. Will continue to be assessed.     Tobacco Use: No current tobacco use.      Diagnosis:  1. Generalized anxiety disorder    2. Severe episode of recurrent major depressive disorder, without psychotic features (H)      Collateral Reports Completed:   Not Applicable    PLAN: (Patient Tasks / Therapist Tasks / Other)  Continue to write as  a means to tolerate distress.        I have reviewed the note as documented above. This accurately captures the substance of my conversation with the client.  Leticia Goodman M.S.GUERITA., ATIYA.                                                         ______________________________________________________________________    Treatment Plan     Patient's Name: Brit Edge                   YOB: 2000     Date: 8/10/2021     DSM5 Diagnoses:               296.33 (F33.2) Major Depressive Disorder, Recurrent Episode, Severe               300.02 (F41.1) Generalized Anxiety Disorder     Psychosocial / Contextual Factors: The client is a [age] year old  who resides with her partner's sister in an apartment in [place], [place]. The client is employed full-time as a  at [employer] in [place], [place]. The client reported that she is attending individual therapy at this time because she has been experiencing symptoms of both depression and anxiety.     WHODAS: 28     Referral / Collaboration:  Referral to another professional/service is not indicated at this time.     Anticipated number of session or this episode of care: 12     MeasurableTreatment Goal(s) related to diagnosis / functional impairment(s)  Goal 1: The client will learn and apply skills to manage the symptoms of depression she has been experiencing.    I will know I've met my goal when I don't feel so down and depressed.       Objective #A    The client will decrease her frequency and intensity of feeling down, depressed and hopeless.  Status: Continued - Date(s): 8/10/2021      Intervention(s)  Therapist will assist and support the client in decreasing her frequency and intensity of feeling down, depressed and hopeless.     Objective #B  The client will improve her concentration, focus, and ability to be mindful in daily activities.   Status: Continued - Date(s): 8/10/2021      Intervention(s)  Therapist will assist and support  "the client in improving her concentration, focus, and ability to be mindful in daily activities.     Objective #C  The client will decrease her frequency and intensity of suicidal ideation.  Status: Continued - Date(s): 8/10/2021      Intervention(s)  Therapist will assist and support the client in decreasing her frequency and intensity of suicidal ideation.        Goal 2: The client will learn and apply skills to manage the symptoms of anxiety she has been experiencing.    I will know I've met my goal when I don't worry so much.        Objective #A  The client will use \"worry time\" each day for thirty minutes of scheduled worry and then defer obsessive or anxious thinking until the next structured \"worry time\".                          Status: Continued - Date(s): 8/10/2021      Intervention(s)  Therapist will teach and support the client in learning and using \"worry time\".     Objective #B  The client will use relaxation strategies two times per day to reduce the physical symptoms of anxiety.  Status: Continued - Date(s): 8/10/2021      Intervention(s)  Therapist will teach and support the client in learning and using relaxation strategies.     Objective #C  The client will use cognitive strategies identified in therapy to challenge anxious thoughts.                      Status: Continued - Date(s): 8/10/2021      Intervention(s)  Therapist will teach and support the client in learning and using cognitive strategies to challenge anxious thoughts.      Objective #D  The client will learn how to use her senses to ground herself in her body.                 Status: Continued - Date(s): 8/10/2021      Intervention(s)  Therapist will  and support the client in learning how to use her senses to ground herself in her body.     Objective #E  The client will be mindful of the body sensations she is experiencing when she is feeling anxious.                        Status: Continued - Date(s): 8/10/2021 "      Intervention(s)  Therapist will  and support the client in being mindful of the body sensations she is experiencing when she is feeling anxious.        The client has verbally agreed to the above plan.        JAMAAL Hoffmann.S.GUERITA., L.I.C.S.W.              August 10, 2021

## 2021-09-14 ENCOUNTER — VIRTUAL VISIT (OUTPATIENT)
Dept: PSYCHOLOGY | Facility: CLINIC | Age: 21
End: 2021-09-14
Payer: COMMERCIAL

## 2021-09-14 DIAGNOSIS — F33.2 SEVERE EPISODE OF RECURRENT MAJOR DEPRESSIVE DISORDER, WITHOUT PSYCHOTIC FEATURES (H): ICD-10-CM

## 2021-09-14 DIAGNOSIS — F41.1 GENERALIZED ANXIETY DISORDER: Primary | ICD-10-CM

## 2021-09-14 PROCEDURE — 90834 PSYTX W PT 45 MINUTES: CPT | Mod: 95 | Performed by: SOCIAL WORKER

## 2021-09-15 NOTE — PROGRESS NOTES
"                                           Progress Note    Patient Name: Brit Edge  Date: 9/14/2021       Service Type: Individual      Session Start Time: 8:30 AM  Session End Time: 9:20 AM     Session Length: 50 Minutes    Session #: 32    Attendees: Client    Service Modality:  Phone Visit    The client has been notified of the following:      \"We have found that certain health care needs can be provided without the need for a face to face visit. This service lets us provide the care you need with a phone conversation. I will have full access to your [facility] medical record during this entire phone call. I will be taking notes for your medical record. Since this is like an office visit, we will bill your insurance company for this service. There are potential benefits and risks of telephone visits (e.g. limits to patient confidentiality) that differ from in-person visits.?Confidentiality still applies for telephone services, and nobody will record the visit. It is important to be in a quiet, private space that is free of distractions (including cell phone or other devices) during the visit. If during the course of the call, I believe a telephone visit is not appropriate, you will not be charged for this service\".     Consent has been obtained for this service by the provider: Yes      Treatment Plan Last Reviewed: 8/10/2021  PHQ-9 / LISA-7 : 13/12    DATA  Interactive Complexity: No  Crisis: No       Progress Since Last Session (Related to Symptoms / Goals / Homework):   Symptoms: No change : The client reported no change in her symptoms since the previous phone visit.    Homework: Achieved / completed to satisfaction. The client reported that she has continued to write as a means to tolerate distress.      Episode of Care Goals: No improvement - ACTION (Actively working towards change); Intervened by reinforcing change plan / affirming steps taken.     Current / Ongoing Stressors and Concerns:   The client " reported that her works related stress remains low. She indicated that her relationship stress has increased.     Treatment Objective(s) Addressed in This Session:   The client will decrease his frequency and intensity of feeling down, depressed and hopeless.     Intervention:   Talked about the client's recent stressors including increased relationship stress. Examined the client's stable mood. Obtained a commitment from the client to continue to write as a means to tolerate distress.      ASSESSMENT: Current Emotional / Mental Status (status of significant symptoms):   Risk status (Self / Other harm or suicidal ideation)   Client denies current fears or concerns for personal safety.   Client denies current or recent suicidal ideation or behaviors.   Client denies current or recent homicidal ideation or behaviors.   Client denies current or recent self injurious behavior or ideation.   Client denies other safety concerns.   Client reports there has been a change in risk factors since her last session. She states that her relationship stress has increased.   Client reports there has been a change in protective factors since her last session. She states that her job continues to go well.   Recommended that the client call 911 or go to the local emergency department should there be a change in any of these risk factors.     Attitude:   Cooperative    Orientation:   All   Speech    Rate / Production: Normal/ Responsive    Volume:  Normal    Mood:    Normal   Thought Content:  Clear    Thought Form:  Coherent  Logical    Insight:    Good      Medication Review:   No changes to current psychiatric medication(s).     Medication Compliance:   Yes     Changes in Health Issues:   None reported.     Chemical Use Review:   Substance Use: Chemical use reviewed. Will continue to be assessed.     Tobacco Use: No current tobacco use.      Diagnosis:  1. Generalized anxiety disorder    2. Severe episode of recurrent major  depressive disorder, without psychotic features (H)      Collateral Reports Completed:   Not Applicable    PLAN: (Patient Tasks / Therapist Tasks / Other)  Continue to write as a means to tolerate distress.        I have reviewed the note as documented above. This accurately captures the substance of my conversation with the client.  CORA Hoffmann., ATIYA.                                                         ______________________________________________________________________    Treatment Plan     Patient's Name: Brit Edge                   YOB: 2000     Date: 8/10/2021     DSM5 Diagnoses:               296.33 (F33.2) Major Depressive Disorder, Recurrent Episode, Severe               300.02 (F41.1) Generalized Anxiety Disorder     Psychosocial / Contextual Factors: The client is a [age] year old  who resides with her partner's sister in an apartment in [place], [place]. The client is employed full-time as a  at [employer] in [place], [place]. The client reported that she is attending individual therapy at this time because she has been experiencing symptoms of both depression and anxiety.     WHODAS: 28     Referral / Collaboration:  Referral to another professional/service is not indicated at this time.     Anticipated number of session or this episode of care: 12     MeasurableTreatment Goal(s) related to diagnosis / functional impairment(s)  Goal 1: The client will learn and apply skills to manage the symptoms of depression she has been experiencing.    I will know I've met my goal when I don't feel so down and depressed.       Objective #A    The client will decrease her frequency and intensity of feeling down, depressed and hopeless.  Status: Continued - Date(s): 8/10/2021      Intervention(s)  Therapist will assist and support the client in decreasing her frequency and intensity of feeling down, depressed and hopeless.     Objective #B  The client will  "improve her concentration, focus, and ability to be mindful in daily activities.   Status: Continued - Date(s): 8/10/2021      Intervention(s)  Therapist will assist and support the client in improving her concentration, focus, and ability to be mindful in daily activities.     Objective #C  The client will decrease her frequency and intensity of suicidal ideation.  Status: Continued - Date(s): 8/10/2021      Intervention(s)  Therapist will assist and support the client in decreasing her frequency and intensity of suicidal ideation.        Goal 2: The client will learn and apply skills to manage the symptoms of anxiety she has been experiencing.    I will know I've met my goal when I don't worry so much.        Objective #A  The client will use \"worry time\" each day for thirty minutes of scheduled worry and then defer obsessive or anxious thinking until the next structured \"worry time\".                          Status: Continued - Date(s): 8/10/2021      Intervention(s)  Therapist will teach and support the client in learning and using \"worry time\".     Objective #B  The client will use relaxation strategies two times per day to reduce the physical symptoms of anxiety.  Status: Continued - Date(s): 8/10/2021      Intervention(s)  Therapist will teach and support the client in learning and using relaxation strategies.     Objective #C  The client will use cognitive strategies identified in therapy to challenge anxious thoughts.                      Status: Continued - Date(s): 8/10/2021      Intervention(s)  Therapist will teach and support the client in learning and using cognitive strategies to challenge anxious thoughts.      Objective #D  The client will learn how to use her senses to ground herself in her body.                 Status: Continued - Date(s): 8/10/2021      Intervention(s)  Therapist will  and support the client in learning how to use her senses to ground herself in her body.     Objective " #E  The client will be mindful of the body sensations she is experiencing when she is feeling anxious.                        Status: Continued - Date(s): 8/10/2021      Intervention(s)  Therapist will  and support the client in being mindful of the body sensations she is experiencing when she is feeling anxious.        The client has verbally agreed to the above plan.        JAMAAL Hoffmann.S.GUERITA., L.I.C.S.W.              August 10, 2021

## 2021-09-18 ENCOUNTER — HEALTH MAINTENANCE LETTER (OUTPATIENT)
Age: 21
End: 2021-09-18

## 2021-10-12 ENCOUNTER — OFFICE VISIT (OUTPATIENT)
Dept: PSYCHOLOGY | Facility: CLINIC | Age: 21
End: 2021-10-12
Payer: COMMERCIAL

## 2021-10-12 DIAGNOSIS — F33.2 SEVERE EPISODE OF RECURRENT MAJOR DEPRESSIVE DISORDER, WITHOUT PSYCHOTIC FEATURES (H): ICD-10-CM

## 2021-10-12 DIAGNOSIS — F41.1 GENERALIZED ANXIETY DISORDER: Primary | ICD-10-CM

## 2021-10-12 PROCEDURE — 90834 PSYTX W PT 45 MINUTES: CPT | Performed by: SOCIAL WORKER

## 2021-10-12 NOTE — PROGRESS NOTES
Discharge Summary  Multiple Sessions    Client Name: Brit Edge MRN#: [id number] YOB: 2000    Discharge Date:   October 12, 2021    Service Modality: In-person    Service Type: Individual      Session Start Time: 8:30 AM  Session End Time: 9:20 AM      Session Length: 40 Minutes     Session #: 33     Attendees: Client    Focus of Treatment Objective(s):  The client's presenting concerns included struggling to manage symptoms associated with major depressive disorder and generalized anxiety disorder.  Stage of change at time of discharge: MAINTENANCE (Working to maintain change, with risk of relapse)    Medication Adherence:  Yes    Chemical Use:  No    Assessment: Current Emotional / Mental Status (status of significant symptoms):    Risk status (Self / Other harm or suicidal ideation)  Client denies current fears or concerns for personal safety.  Client denies current or recent suicidal ideation or behaviors.  Client denies current or recent homicidal ideation or behaviors.  Client denies current or recent self injurious behavior or ideation.  Client denies other safety concerns.    Appearance:   Appropriate   Eye Contact:   Good   Psychomotor Behavior: Normal   Attitude:   Cooperative   Orientation:   All  Speech   Rate / Production: Normal/ Responsive   Volume:  Normal   Mood:    Normal  Affect:    Appropriate   Thought Content:  Clear   Thought Form:  Coherent  Logical   Insight:   Good     DSM5 Diagnoses: (Sustained by DSM5 Criteria Listed Above)  Diagnoses: 296.33 (F33.2) Major Depressive Disorder, Recurrent Episode, Severe   300.02 (F41.1) Generalized Anxiety Disorder  Psychosocial & Contextual Factors: The client is a [age] year old  who resides with her partner's sister in an apartment in [place], [place]. The client is employed full-time as a  at [employer] in [place], [place]. The client was attending individual therapy because she was experiencing  symptoms of both depression and anxiety.  WHODAS 2.0 (12 item) Score: 28    Reason for Discharge:  The client reported that she is satisfied with her progress in individual therapy. She indicated that she is feeling less anxious and less depressed overall. She stated that, when she does feel anxious or depressed, she is better able to manage the symptoms of anxiety and depression.     Aftercare Plan:  The client may resume counseling services at any time in the future by calling the [facility] intake office at [phone].      Leticia Goodman M.S.GUERITA., L.I.C.S.W.  October 12, 2021

## 2021-10-26 ENCOUNTER — LAB (OUTPATIENT)
Dept: LAB | Facility: CLINIC | Age: 21
End: 2021-10-26
Payer: COMMERCIAL

## 2021-10-26 DIAGNOSIS — F64.9 GENDER DYSPHORIA: ICD-10-CM

## 2021-10-26 LAB — ESTRADIOL SERPL-MCNC: 130 PG/ML

## 2021-10-26 PROCEDURE — 84403 ASSAY OF TOTAL TESTOSTERONE: CPT

## 2021-10-26 PROCEDURE — 82670 ASSAY OF TOTAL ESTRADIOL: CPT

## 2021-10-26 PROCEDURE — 36415 COLL VENOUS BLD VENIPUNCTURE: CPT

## 2021-11-02 LAB — TESTOST SERPL-MCNC: 14 NG/DL (ref 8–950)

## 2021-11-08 ENCOUNTER — VIRTUAL VISIT (OUTPATIENT)
Dept: FAMILY MEDICINE | Facility: CLINIC | Age: 21
End: 2021-11-08
Payer: COMMERCIAL

## 2021-11-08 DIAGNOSIS — F64.9 GENDER DYSPHORIA: ICD-10-CM

## 2021-11-08 DIAGNOSIS — F33.1 MODERATE RECURRENT MAJOR DEPRESSION (H): ICD-10-CM

## 2021-11-08 PROCEDURE — 99213 OFFICE O/P EST LOW 20 MIN: CPT | Mod: 95 | Performed by: INTERNAL MEDICINE

## 2021-11-08 RX ORDER — SPIRONOLACTONE 100 MG/1
150 TABLET, FILM COATED ORAL 2 TIMES DAILY
Qty: 270 TABLET | Refills: 3 | Status: SHIPPED | OUTPATIENT
Start: 2021-11-08 | End: 2022-05-16

## 2021-11-08 RX ORDER — ESTRADIOL 2 MG/1
2 TABLET ORAL 2 TIMES DAILY
Qty: 180 TABLET | Refills: 3 | Status: SHIPPED | OUTPATIENT
Start: 2021-11-08 | End: 2022-05-16

## 2021-11-08 RX ORDER — SERTRALINE HYDROCHLORIDE 100 MG/1
100 TABLET, FILM COATED ORAL DAILY
Qty: 90 TABLET | Refills: 3 | Status: SHIPPED | OUTPATIENT
Start: 2021-11-08 | End: 2022-05-16

## 2021-11-08 ASSESSMENT — ANXIETY QUESTIONNAIRES
GAD7 TOTAL SCORE: 7
2. NOT BEING ABLE TO STOP OR CONTROL WORRYING: SEVERAL DAYS
3. WORRYING TOO MUCH ABOUT DIFFERENT THINGS: SEVERAL DAYS
7. FEELING AFRAID AS IF SOMETHING AWFUL MIGHT HAPPEN: NOT AT ALL
6. BECOMING EASILY ANNOYED OR IRRITABLE: MORE THAN HALF THE DAYS
5. BEING SO RESTLESS THAT IT IS HARD TO SIT STILL: NOT AT ALL
IF YOU CHECKED OFF ANY PROBLEMS ON THIS QUESTIONNAIRE, HOW DIFFICULT HAVE THESE PROBLEMS MADE IT FOR YOU TO DO YOUR WORK, TAKE CARE OF THINGS AT HOME, OR GET ALONG WITH OTHER PEOPLE: SOMEWHAT DIFFICULT
1. FEELING NERVOUS, ANXIOUS, OR ON EDGE: MORE THAN HALF THE DAYS

## 2021-11-08 ASSESSMENT — PATIENT HEALTH QUESTIONNAIRE - PHQ9: 5. POOR APPETITE OR OVEREATING: SEVERAL DAYS

## 2021-11-08 NOTE — PROGRESS NOTES
"Brit is a 21 year old who is being evaluated via a billable video visit.      How would you like to obtain your AVS? MyChart  If the video visit is dropped, the invitation should be resent by: Text to cell phone: [phone]  Will anyone else be joining your video visit? No      Video Start Time: 4:36 PM    Assessment & Plan     Gender dysphoria: non-binary gender identity    Brit is feeling well on the current hormone doses and hormone lab levels are within goal, so we'll continue current doses and recheck in 6 months.     - spironolactone (ALDACTONE) 100 MG tablet; Take 1.5 tablets (150 mg) by mouth 2 times daily  - estradiol (ESTRACE) 2 MG tablet; Take 1 tablet (2 mg) by mouth 2 times daily  - Estradiol; Future  - Testosterone, total; Future  - Basic metabolic panel  (Ca, Cl, CO2, Creat, Gluc, K, Na, BUN); Future    Moderate recurrent major depression (H)    Mood doing well recently, continue current med    - sertraline (ZOLOFT) 100 MG tablet; Take 1 tablet (100 mg) by mouth daily          Return in about 6 months (around 5/8/2022) for Lab appointment and follow-up visit about 1 week after labs.    Jhon Brody MD  [facility]    Subjective   Brit is a 21 year old who presents for the following health issues     HPI     Chief Complaint   Patient presents with     RECHECK     recent labs from 10/26/21. Discuss how patient is doing.         I saw Brit last on 7/26: \"Brit has been doing well since last visit with ongoing physiological changes with breast development and softening of the skin.  They have noticed decreased libido- certainly could be related to the HRT but also could be related to the increased sertraline dose.  Discussed we could try lowering spironolactone or the sertraline, but Brit prefers to continue with current dosing at this time.  Testosterone level is at goal of <55, but estrogen level was on the low side with a level of 57 (goal 100-200).  Estrogen was at goal in April and " "we did not change anything with the dosing, so unclear why this dropped by down.  We discussed increasing the estradiol but decided to hold off on that for now, continue current dosing, and recheck levels again in 3 months.  If still low at that point, we'll increase med.\"    Brit reports that the past few months have been going very well.  Feeling more body positive and having less anxiety, especially socially.  The decreased libido has improved, may have been anxiety related.          Review of Systems   Constitutional, endo systems are negative, except as otherwise noted.      Objective           Vitals:  No vitals were obtained today due to virtual visit.    Physical Exam   GENERAL: Healthy, alert and no distress  EYES: Eyes grossly normal to inspection.  No discharge or erythema, or obvious scleral/conjunctival abnormalities.  RESP: No audible wheeze, cough, or visible cyanosis.  No visible retractions or increased work of breathing.    SKIN: Visible skin clear. No significant rash, abnormal pigmentation or lesions.  NEURO: Cranial nerves grossly intact.  Mentation and speech appropriate for age.  PSYCH: Mentation appears normal, affect normal/bright, judgement and insight intact, normal speech and appearance well-groomed.          Video-Visit Details    Type of service:  Video Visit    Video End Time:4:40 PM    Originating Location (pt. Location): Home    Distant Location (provider location):  Home     Platform used for Video Visit: Edy  "

## 2021-11-09 ASSESSMENT — ANXIETY QUESTIONNAIRES: GAD7 TOTAL SCORE: 7

## 2021-11-09 ASSESSMENT — PATIENT HEALTH QUESTIONNAIRE - PHQ9: SUM OF ALL RESPONSES TO PHQ QUESTIONS 1-9: 6

## 2022-01-08 ENCOUNTER — HEALTH MAINTENANCE LETTER (OUTPATIENT)
Age: 22
End: 2022-01-08

## 2022-04-01 DIAGNOSIS — H10.13 ALLERGIC CONJUNCTIVITIS, BILATERAL: ICD-10-CM

## 2022-04-04 RX ORDER — OLOPATADINE HYDROCHLORIDE 2 MG/ML
1 SOLUTION/ DROPS OPHTHALMIC DAILY
Qty: 2.5 ML | Refills: 1 | Status: SHIPPED | OUTPATIENT
Start: 2022-04-04 | End: 2023-04-11

## 2022-05-09 ENCOUNTER — LAB (OUTPATIENT)
Dept: LAB | Facility: CLINIC | Age: 22
End: 2022-05-09
Payer: COMMERCIAL

## 2022-05-09 DIAGNOSIS — F64.9 GENDER DYSPHORIA: ICD-10-CM

## 2022-05-09 LAB
ANION GAP SERPL CALCULATED.3IONS-SCNC: 4 MMOL/L (ref 3–14)
BUN SERPL-MCNC: 14 MG/DL (ref 7–30)
CALCIUM SERPL-MCNC: 9 MG/DL (ref 8.5–10.1)
CHLORIDE BLD-SCNC: 106 MMOL/L (ref 94–109)
CO2 SERPL-SCNC: 26 MMOL/L (ref 20–32)
CREAT SERPL-MCNC: 0.7 MG/DL (ref 0.52–1.25)
ESTRADIOL SERPL-MCNC: 103 PG/ML
GFR SERPL CREATININE-BSD FRML MDRD: >90 ML/MIN/1.73M2
GLUCOSE BLD-MCNC: 93 MG/DL (ref 70–99)
POTASSIUM BLD-SCNC: 4.3 MMOL/L (ref 3.4–5.3)
SODIUM SERPL-SCNC: 136 MMOL/L (ref 133–144)

## 2022-05-09 PROCEDURE — 80048 BASIC METABOLIC PNL TOTAL CA: CPT

## 2022-05-09 PROCEDURE — 36415 COLL VENOUS BLD VENIPUNCTURE: CPT

## 2022-05-09 PROCEDURE — 84403 ASSAY OF TOTAL TESTOSTERONE: CPT

## 2022-05-09 PROCEDURE — 82670 ASSAY OF TOTAL ESTRADIOL: CPT

## 2022-05-11 LAB — TESTOST SERPL-MCNC: 18 NG/DL (ref 8–950)

## 2022-05-16 ENCOUNTER — VIRTUAL VISIT (OUTPATIENT)
Dept: FAMILY MEDICINE | Facility: CLINIC | Age: 22
End: 2022-05-16
Payer: COMMERCIAL

## 2022-05-16 DIAGNOSIS — F64.9 GENDER DYSPHORIA: ICD-10-CM

## 2022-05-16 DIAGNOSIS — F33.1 MODERATE RECURRENT MAJOR DEPRESSION (H): ICD-10-CM

## 2022-05-16 PROCEDURE — 99214 OFFICE O/P EST MOD 30 MIN: CPT | Mod: 95 | Performed by: INTERNAL MEDICINE

## 2022-05-16 RX ORDER — SPIRONOLACTONE 100 MG/1
150 TABLET, FILM COATED ORAL 2 TIMES DAILY
Qty: 270 TABLET | Refills: 3 | Status: SHIPPED | OUTPATIENT
Start: 2022-05-16 | End: 2022-11-28

## 2022-05-16 RX ORDER — ESTRADIOL 2 MG/1
2 TABLET ORAL 2 TIMES DAILY
Qty: 180 TABLET | Refills: 3 | Status: SHIPPED | OUTPATIENT
Start: 2022-05-16 | End: 2022-11-28

## 2022-05-16 ASSESSMENT — ANXIETY QUESTIONNAIRES
5. BEING SO RESTLESS THAT IT IS HARD TO SIT STILL: NOT AT ALL
3. WORRYING TOO MUCH ABOUT DIFFERENT THINGS: NOT AT ALL
GAD7 TOTAL SCORE: 5
IF YOU CHECKED OFF ANY PROBLEMS ON THIS QUESTIONNAIRE, HOW DIFFICULT HAVE THESE PROBLEMS MADE IT FOR YOU TO DO YOUR WORK, TAKE CARE OF THINGS AT HOME, OR GET ALONG WITH OTHER PEOPLE: SOMEWHAT DIFFICULT
2. NOT BEING ABLE TO STOP OR CONTROL WORRYING: SEVERAL DAYS
7. FEELING AFRAID AS IF SOMETHING AWFUL MIGHT HAPPEN: NOT AT ALL
6. BECOMING EASILY ANNOYED OR IRRITABLE: SEVERAL DAYS
1. FEELING NERVOUS, ANXIOUS, OR ON EDGE: MORE THAN HALF THE DAYS

## 2022-05-16 ASSESSMENT — PATIENT HEALTH QUESTIONNAIRE - PHQ9
SUM OF ALL RESPONSES TO PHQ QUESTIONS 1-9: 4
5. POOR APPETITE OR OVEREATING: SEVERAL DAYS

## 2022-05-16 NOTE — PROGRESS NOTES
Brit is a 22 year old who is being evaluated via a billable video visit.      How would you like to obtain your AVS? MyChart  If the video visit is dropped, the invitation should be resent by: Text to cell phone: [phone]  Will anyone else be joining your video visit? No    Video Start Time: 5:07 PM    Assessment & Plan     Gender dysphoria: non-binary gender identity    Brit feels that the hormone doses are working well and recent labs were within goal.  Continue current meds and follow-up in 6 months.     - estradiol (ESTRACE) 2 MG tablet; Take 1 tablet (2 mg) by mouth 2 times daily  - spironolactone (ALDACTONE) 100 MG tablet; Take 1.5 tablets (150 mg) by mouth 2 times daily  - Basic metabolic panel  (Ca, Cl, CO2, Creat, Gluc, K, Na, BUN); Future  - Estradiol; Future  - Testosterone, total; Future    Moderate recurrent major depression (H)    Mood has been good, and Brit would like to try decreasing sertraline from 100mg to 50mg.  Follow-up as needed if symptoms worsen of if they'd like to continue to titrate down/off medication.    - sertraline (ZOLOFT) 50 MG tablet; Take 1 tablet (50 mg) by mouth daily         Patient Instructions   Check labs in 6 months [facility] between injections, then do a follow-up visit 1 week after the lab draw.         No follow-ups on file.    Jhon Brody MD  [facility]    Subjective   Brit is a 22 year old who presents for the following health issues     HPI     Chief Complaint   Patient presents with     Follow Up     Recent labs done 5/9/2022. BMP, Testosterone, and Estradiol.       Brit presents for follow-up of gender affirming hormone therapy.  Recent labs showed normal BMP, and testosterone and estrogen levels within goal range.  Brit feels that things are going really well since last visit. Mental health has been much improved since last year.  They are wondering about cutting down on the sertraline.  Brit has noted ongoing body changes from the hormones and  feels this is going well too.          PHQ 5/16/2022   PHQ-9 Total Score 4   Q9: Thoughts of better off dead/self-harm past 2 weeks Not at all   F/U: Thoughts of suicide or self-harm -   F/U: Safety concerns -         Review of Systems   Constitutional, endo, psych systems are negative, except as otherwise noted.      Objective           Vitals:  No vitals were obtained today due to virtual visit.    Physical Exam   GENERAL: Healthy, alert and no distress  EYES: Eyes grossly normal to inspection.  No discharge or erythema, or obvious scleral/conjunctival abnormalities.  RESP: No audible wheeze, cough, or visible cyanosis.  No visible retractions or increased work of breathing.    SKIN: Visible skin clear. No significant rash, abnormal pigmentation or lesions.  NEURO: Cranial nerves grossly intact.  Mentation and speech appropriate for age.  PSYCH: Mentation appears normal, affect normal/bright, judgement and insight intact, normal speech and appearance well-groomed.                Video-Visit Details    Type of service:  Video Visit    Video End Time:5:12 PM    Originating Location (pt. Location): Home    Distant Location (provider location):  Home    Platform used for Video Visit: Kuapay

## 2022-05-16 NOTE — LETTER
My Depression Action Plan  Name: Iker Edge   Date of Birth 2000  Date: 5/16/2022    My doctor: Paul Pacheco   My clinic: [facility]  [address] [address]  [phone]          GREEN    ZONE   Good Control    What it looks like:     Things are going generally well. You have normal ups and downs. You may even feel depressed from time to time, but bad moods usually last less than a day.   What you need to do:  1. Continue to care for yourself (see self care plan)  2. Check your depression survival kit and update it as needed  3. Follow your physician s recommendations including any medication.  4. Do not stop taking medication unless you consult with your physician first.           YELLOW         ZONE Getting Worse    What it looks like:     Depression is starting to interfere with your life.     It may be hard to get out of bed; you may be starting to isolate yourself from others.    Symptoms of depression are starting to last most all day and this has happened for several days.     You may have suicidal thoughts but they are not constant.   What you need to do:     1. Call your care team. Your response to treatment will improve if you keep your care team informed of your progress. Yellow periods are signs an adjustment may need to be made.     2. Continue your self-care.  Just get dressed and ready for the day.  Don't give yourself time to talk yourself out of it.    3. Talk to someone in your support network.    4. Open up your Depression Self-Care Plan/Wellness Kit.           RED    ZONE Medical Alert - Get Help    What it looks like:     Depression is seriously interfering with your life.     You may experience these or other symptoms: You can t get out of bed most days, can t work or engage in other necessary activities, you have trouble taking care of basic hygiene, or basic responsibilities, thoughts of suicide or death that  will not go away, self-injurious behavior.     What you need to do:  1. Call your care team and request a same-day appointment. If they are not available (weekends or after hours) call your local crisis line, emergency room or 911.          Depression Self-Care Plan / Wellness Kit    Many people find that medication and therapy are helpful treatments for managing depression. In addition, making small changes to your everyday life can help to boost your mood and improve your wellbeing. Below are some tips for you to consider. Be sure to talk with your medical provider and/or behavioral health consultant if your symptoms are worsening or not improving.     Sleep   Sleep hygiene  means all of the habits that support good, restful sleep. It includes maintaining a consistent bedtime and wake time, using your bedroom only for sleeping or sex, and keeping the bedroom dark and free of distractions like a computer, smartphone, or television.     Develop a Healthy Routine  Maintain good hygiene. Get out of bed in the morning, make your bed, brush your teeth, take a shower, and get dressed. Don t spend too much time viewing media that makes you feel stressed. Find time to relax each day.    Exercise  Get some form of exercise every day. This will help reduce pain and release endorphins, the  feel good  chemicals in your brain. It can be as simple as just going for a walk or doing some gardening, anything that will get you moving.      Diet  Strive to eat healthy foods, including fruits and vegetables. Drink plenty of water. Avoid excessive sugar, caffeine, alcohol, and other mood-altering substances.     Stay Connected with Others  Stay in touch with friends and family members.    Manage Your Mood  Try deep breathing, massage therapy, biofeedback, or meditation. Take part in fun activities when you can. Try to find something to smile about each day.     Psychotherapy  Be open to working with a therapist if your provider  recommends it.     Medication  Be sure to take your medication as prescribed. Most anti-depressants need to be taken every day. It usually takes several weeks for medications to work. Not all medicines work for all people. It is important to follow-up with your provider to make sure you have a treatment plan that is working for you. Do not stop your medication abruptly without first discussing it with your provider.    Crisis Resources   These hotlines are for both adults and children. They and are open 24 hours a day, 7 days a week unless noted otherwise.      National Suicide Prevention Lifeline   5-101-874-GXRA (5456)      Crisis Text Line    www.crisistextline.org  Text HOME to 669293 from anywhere in the [place], anytime, about any type of crisis. A live, trained crisis counselor will receive the text and respond quickly.      [place] Lifeline for LGBTQ Youth  A national crisis intervention and suicide lifeline for LGBTQ youth under 25. Provides a safe place to talk without judgement. Call 1-944.181.7974; text START to 101340 or visit www.thetrevorproject.org to talk to a trained counselor.      For [facility] crisis numbers, visit the [facility] website at:  https://[place].gov/dhs/people-we-serve/adults/health-care/mental-health/resources/crisis-contacts.jsp

## 2022-05-16 NOTE — PATIENT INSTRUCTIONS
Check labs in 6 months assisted between injections, then do a follow-up visit 1 week after the lab draw.

## 2022-05-17 ASSESSMENT — ANXIETY QUESTIONNAIRES: GAD7 TOTAL SCORE: 5

## 2022-11-15 ENCOUNTER — LAB (OUTPATIENT)
Dept: LAB | Facility: CLINIC | Age: 22
End: 2022-11-15
Payer: COMMERCIAL

## 2022-11-15 DIAGNOSIS — F64.9 GENDER DYSPHORIA: ICD-10-CM

## 2022-11-15 LAB
ANION GAP SERPL CALCULATED.3IONS-SCNC: 7 MMOL/L (ref 7–15)
BUN SERPL-MCNC: 12.2 MG/DL (ref 6–20)
CALCIUM SERPL-MCNC: 9.6 MG/DL (ref 8.6–10)
CHLORIDE SERPL-SCNC: 103 MMOL/L (ref 98–107)
CREAT SERPL-MCNC: 0.68 MG/DL (ref 0.51–1.17)
DEPRECATED HCO3 PLAS-SCNC: 28 MMOL/L (ref 22–29)
ESTRADIOL SERPL-MCNC: 99 PG/ML
GFR SERPL CREATININE-BSD FRML MDRD: >90 ML/MIN/1.73M2
GLUCOSE SERPL-MCNC: 104 MG/DL (ref 70–99)
POTASSIUM SERPL-SCNC: 4 MMOL/L (ref 3.4–5.3)
SODIUM SERPL-SCNC: 138 MMOL/L (ref 136–145)

## 2022-11-15 PROCEDURE — 80048 BASIC METABOLIC PNL TOTAL CA: CPT

## 2022-11-15 PROCEDURE — 36415 COLL VENOUS BLD VENIPUNCTURE: CPT

## 2022-11-15 PROCEDURE — 84403 ASSAY OF TOTAL TESTOSTERONE: CPT

## 2022-11-15 PROCEDURE — 82670 ASSAY OF TOTAL ESTRADIOL: CPT

## 2022-11-19 ENCOUNTER — HEALTH MAINTENANCE LETTER (OUTPATIENT)
Age: 22
End: 2022-11-19

## 2022-11-20 LAB — TESTOST SERPL-MCNC: 21 NG/DL (ref 8–950)

## 2022-11-28 ENCOUNTER — VIRTUAL VISIT (OUTPATIENT)
Dept: FAMILY MEDICINE | Facility: CLINIC | Age: 22
End: 2022-11-28
Payer: COMMERCIAL

## 2022-11-28 DIAGNOSIS — Z11.4 SCREENING FOR HIV (HUMAN IMMUNODEFICIENCY VIRUS): ICD-10-CM

## 2022-11-28 DIAGNOSIS — F64.9 GENDER DYSPHORIA: ICD-10-CM

## 2022-11-28 DIAGNOSIS — Z11.59 NEED FOR HEPATITIS C SCREENING TEST: ICD-10-CM

## 2022-11-28 DIAGNOSIS — F33.1 MODERATE RECURRENT MAJOR DEPRESSION (H): ICD-10-CM

## 2022-11-28 PROCEDURE — 96127 BRIEF EMOTIONAL/BEHAV ASSMT: CPT | Mod: 95 | Performed by: STUDENT IN AN ORGANIZED HEALTH CARE EDUCATION/TRAINING PROGRAM

## 2022-11-28 PROCEDURE — 99214 OFFICE O/P EST MOD 30 MIN: CPT | Mod: 95 | Performed by: STUDENT IN AN ORGANIZED HEALTH CARE EDUCATION/TRAINING PROGRAM

## 2022-11-28 RX ORDER — ESTRADIOL 2 MG/1
2 TABLET ORAL 2 TIMES DAILY
Qty: 180 TABLET | Refills: 3 | Status: SHIPPED | OUTPATIENT
Start: 2022-11-28 | End: 2023-05-22

## 2022-11-28 RX ORDER — SPIRONOLACTONE 100 MG/1
150 TABLET, FILM COATED ORAL 2 TIMES DAILY
Qty: 270 TABLET | Refills: 3 | Status: SHIPPED | OUTPATIENT
Start: 2022-11-28 | End: 2023-05-22

## 2022-11-28 ASSESSMENT — ANXIETY QUESTIONNAIRES
6. BECOMING EASILY ANNOYED OR IRRITABLE: MORE THAN HALF THE DAYS
GAD7 TOTAL SCORE: 7
5. BEING SO RESTLESS THAT IT IS HARD TO SIT STILL: NOT AT ALL
1. FEELING NERVOUS, ANXIOUS, OR ON EDGE: SEVERAL DAYS
2. NOT BEING ABLE TO STOP OR CONTROL WORRYING: SEVERAL DAYS
IF YOU CHECKED OFF ANY PROBLEMS ON THIS QUESTIONNAIRE, HOW DIFFICULT HAVE THESE PROBLEMS MADE IT FOR YOU TO DO YOUR WORK, TAKE CARE OF THINGS AT HOME, OR GET ALONG WITH OTHER PEOPLE: VERY DIFFICULT
GAD7 TOTAL SCORE: 7
3. WORRYING TOO MUCH ABOUT DIFFERENT THINGS: SEVERAL DAYS
7. FEELING AFRAID AS IF SOMETHING AWFUL MIGHT HAPPEN: SEVERAL DAYS

## 2022-11-28 ASSESSMENT — PATIENT HEALTH QUESTIONNAIRE - PHQ9
SUM OF ALL RESPONSES TO PHQ QUESTIONS 1-9: 14
5. POOR APPETITE OR OVEREATING: SEVERAL DAYS

## 2022-11-28 NOTE — PROGRESS NOTES
"Brit is a 22 year old who is being evaluated via a billable video visit.      How would you like to obtain your AVS? MyChart  If the video visit is dropped, the invitation should be resent by: Text to cell phone: [phone]  Will anyone else be joining your video visit? No        1. Moderate recurrent major depression (H)  - refilled sertraline (ZOLOFT) 50 MG tablet; Take 1 tablet (50 mg) by mouth   daily  Dispense: 90 tablet; Refill: 3  - Adult Mental Health  Referral; Future  - patient reports at this time she does not want to increase her dose of Zoloft  - denies any active suicidal ideations or plans to end her life   - has good support with family and friends   - significant work related stress, she is financially stable and plans to change her job by early January      2. Gender dysphoria: non-binary gender identity  > BMP showed normal Potassium values, however, there was a mildly elevated glucose of 104, patient reported she is \"99% sure I was fasting\"   > Estrogen and Testosterone levels were within appropriate limits   - refilled spironolactone (ALDACTONE) 100 MG tablet; Take 1.5 tablets (150 mg) by mouth 2 times daily  Dispense: 270 tablet; Refill: 3  - refilled estradiol (ESTRACE) 2 MG tablet; Take 1 tablet (2 mg) by mouth 2 times daily  Dispense: 180 tablet; Refill: 3  - Basic metabolic panel  (Ca, Cl, CO2, Creat, Gluc, K, Na, BUN); Future  - Estradiol; Future  - Testosterone, total; Future  - Hemoglobin A1c; Future    3. Screening for HIV (human immunodeficiency virus)  - HIV Antigen Antibody Combo; Future    4. Need for hepatitis C screening test  - Hepatitis C Screen Reflex to HCV RNA Quant and Genotype; Future    Follow up plan:   - previous BMP showed possible pre-diabetic ranges, patient has no family history of diabetes, informed patient that first line treatment for prediabetes is diet and exercise, she is aware to cut back on carbohydrate rich foods and get at least 30 minutes of " continuous cardio exercises at least 2 times per week   - follow up with mental health, patient likely suffering from worsening symptoms in the setting of a stressful work environment,she is financially stable and planning on changing jobs at the start of January, she denied any plans or active thoughts of suicidal ideations and is aware to go to the emergency room if she feels she is a danger to herself, there are no weapons at her home, she lives with her parents and partner and has a good relationship with all of them and feels she can talk to them in case of mental health crisis     Dai Gale MD       Subjective   Brit is a 22 year old, presenting for the following health issues:  transgender care, Recheck Medication, Anxiety, and Depression      HPI     Depression and Anxiety Follow-Up    How are you doing with your depression since your last visit? No change    How are you doing with your anxiety since your last visit?  No change    Are you having other symptoms that might be associated with depression or anxiety? Yes:  trouble sleeping    Have you had a significant life event? Job Concerns - leaving long term job and is kind of up in the air right now    Do you have any concerns with your use of alcohol or other drugs? No    Social History     Tobacco Use     Smoking status: Never     Smokeless tobacco: Never     Tobacco comments:     no tobacco exposure   Vaping Use     Vaping Use: Never used   Substance Use Topics     Alcohol use: Yes     Comment: 1-2 weekly     Drug use: No     PHQ 11/8/2021 5/16/2022 11/28/2022   PHQ-9 Total Score 6 4 14   Q9: Thoughts of better off dead/self-harm past 2 weeks Not at all Not at all More than half the days   F/U: Thoughts of suicide or self-harm - - -   F/U: Safety concerns - - -     LISA-7 SCORE 11/8/2021 5/16/2022 11/28/2022   Total Score 7 5 7     Last PHQ-9 11/28/2022   1.  Little interest or pleasure in doing things 2   2.  Feeling down, depressed, or hopeless 2    3.  Trouble falling or staying asleep, or sleeping too much 3   4.  Feeling tired or having little energy 0   5.  Poor appetite or overeating 3   6.  Feeling bad about yourself 2   7.  Trouble concentrating 0   8.  Moving slowly or restless 0   Q9: Thoughts of better off dead/self-harm past 2 weeks 2   PHQ-9 Total Score 14   Difficulty at work, home, or with people Very difficult   In the past two weeks have you had thoughts of suicide or self harm? -   Do you have concerns about your personal safety or the safety of others? -     LISA-7  11/28/2022   1. Feeling nervous, anxious, or on edge 1   2. Not being able to stop or control worrying 1   3. Worrying too much about different things 1   4. Trouble relaxing 1   5. Being so restless that it is hard to sit still 0   6. Becoming easily annoyed or irritable 2   7. Feeling afraid, as if something awful might happen 1   LISA-7 Total Score 7   If you checked any problems, how difficult have they made it for you to do your work, take care of things at home, or get along with other people? Very difficult     A lot of work related stress   Has good support with friends and family   Is not currently speaking with a therapist, but endorsed she is interested in doing so, was seeing therapy in the past   Denies Suicidal Ideations, but admits to passive thoughts of self harm like food restriction or cutting   Never attempted suicide in the past   Has someone she can turn to in case of emergency and is aware to go to the ER if she ever feels that she is in a mental health crisis  At this time, she feels the majority of her stress is from work and does not wish to increase her dose of zoloft from 50mg to 100mg     Suicide Assessment Five-step Evaluation and Treatment (SAFE-T)    Medication Followup of Estradiol 2mg    Taking Medication as prescribed: yes    Side Effects:  None    Medication Helping Symptoms:  yes  Medication Followup of Sertraline 50 mg    Taking Medication as  prescribed: yes    Side Effects:  None    Medication Helping Symptoms:  yes    Medication Followup of spironolactone 2mg    Taking Medication as prescribed: yes    Side Effects:  None    Medication Helping Symptoms:  yes         Review of Systems   As above       Objective    Vitals - Patient Reported  Pain Score: No Pain (0)      Vitals:  No vitals were obtained today due to virtual visit.    Physical Exam   GENERAL: Healthy, alert and no distress  EYES: Eyes grossly normal to inspection.  No discharge or erythema, or obvious scleral/conjunctival abnormalities.  RESP: No audible wheeze, cough, or visible cyanosis.  No visible retractions or increased work of breathing.    SKIN: Visible skin clear. No significant rash, abnormal pigmentation or lesions.  NEURO: Cranial nerves grossly intact.  Mentation and speech appropriate for age.  PSYCH: Mentation appears normal, affect normal/bright, judgement and insight intact, normal speech and appearance well-groomed.    Lab on 11/15/2022   Component Date Value Ref Range Status     Sodium 11/15/2022 138  136 - 145 mmol/L Final     Potassium 11/15/2022 4.0  3.4 - 5.3 mmol/L Final     Chloride 11/15/2022 103  98 - 107 mmol/L Final     Carbon Dioxide (CO2) 11/15/2022 28  22 - 29 mmol/L Final     Anion Gap 11/15/2022 7  7 - 15 mmol/L Final     Urea Nitrogen 11/15/2022 12.2  6.0 - 20.0 mg/dL Final     Creatinine 11/15/2022 0.68  0.51 - 1.17 mg/dL Final    Male and Female  0-2 Months    0.31-0.88 mg/dL  2-12 Months   0.16-0.39 mg/dL  1-2 Years     0.18-0.35 mg/dL  3-4 Years     0.26-0.42 mg/dL  5-6 Years     0.29-0.47 mg/dL  7-8 Years     0.34-0.53 mg/dL  9-10 Years    0.33-0.64 mg/dL  11-12 Years   0.44-0.68 mg/dL  13-14 Years   0.46-0.77 mg/dL    Female  15 Years and older  0.51-0.95 mg/dL    Male  15 Years and older  0.67-1.17 mg/dL         Calcium 11/15/2022 9.6  8.6 - 10.0 mg/dL Final     Glucose 11/15/2022 104 (H)  70 - 99 mg/dL Final     GFR Estimate 11/15/2022 >90  >60  mL/min/1.73m2 Final    GFR not calculated when sex unspecified or nonbinary.  Effective December 21, 2021 eGFRcr in adults is calculated using the 2021 CKD-EPI creatinine equation which includes age and gender (Princess et al., NE, DOI: 10.1056/QDVTqc7627043)     Estradiol 11/15/2022 99  pg/mL Final    Healthy Men:   11.3-43.2 pg/mL    Healthy Postmenopausal Women:  Postmenopause: <5-138 pg/mL    Healthy Pregnant Women:  1st trimester: 154-3243 pg/mL  2nd trimester: 1561-98594 pg/mL  3rd trimester: 8525->70873 pg/mL    Healthy Women Cycle Phase:  Follicular: 30.9-90.4 pg/mL  Ovulation: 60.4-533 pg/mL  Luteal: 60.4-232 pg/mL    Healthy Women Cycle Sub-Phase:  Early Follicular: 20.5-62.8 pg/mL  Intermediate Follicular: 26-79.8 pg/mL  Late Follicular: 49.5-233 pg/mL  Ovulation: 60.4-602 pg/mL  Early Luteal: 51.1-179 pg/mL  Intermediate Luteal: 66.5-305 pg/mL  Late Luteal: 30.2-222 pg/mL     Testosterone Total 11/15/2022 21  8 - 950 ng/dL Final    Comment:   MALE:  0 to 5 months:  ng/dL  6 months to 9 years: <2-20 ng/dL  10 to 11 years: <2-130 ng/dL  12 to 13 years: <2-800 ng/dL  14 years: <2-1200 ng/dL  15 to 16 years: 100-1200 ng/dL  17 to 18 years: 300-1200 ng/dL  19 years and older: 240-950 ng/dL    FEMALE:  0 to 5 months: 20-80 ng/dL  6 months to 9 years: <2-20 ng/dL  10 to 11 years: <2-44 ng/dL  12 to 16 years: <2-75 ng/dL  17 to 18 years: 20-75 ng/dL  19 years and older: 8-60 ng/dL    Values by Eddi Stage:  Stage I (prepubertal)  Male: <2 to 20 ng/dL  Female: <2 to 20 ng/dL    Stage II  Male: 8 to 66 ng/dL  Female: <2 to 47 ng/dL    Stage III  Male: 26 to 800 ng/dL  Female: 17 to 75 ng/dL    Stage IV  Male: 85 to 1200 ng/dL  Female: 20 to 75 ng/dL    Stage V (young adult)  Male: 300 to 950 ng/dL  Female: 12 to 60 ng/dL    Puberty onset (transition from Eddi Stage I to Eddi Stage II) occurs for boys at a median age of 11.5 years and for girls at median age of 10.5 years. There is evidence that it may  occur up to 1 year                            earlier in obese girls and in  girls. For boys there is no definite proven relationship between puberty onset and body weight or ethnic origin. Progression through Eddi stages is variable. Eddi Stage V (young adult) should be reached by age 18.      MALE:  0 to 5 months:  ng/dL  6 months to 9 years: <2-20 ng/dL  10 to 11 years: <2-130 ng/dL  12 to 13 years: <2-800 ng/dL  14 years: <2-1200 ng/dL  15 to 16 years: 100-1200 ng/dL  17 to 18 years: 300-1200 ng/dL  19 years and older: 240-950 ng/dL    FEMALE:  0 to 5 months: 20-80 ng/dL  6 months to 9 years: <2-20 ng/dL  10 to 11 years: <2-44 ng/dL  12 to 16 years: <2-75 ng/dL  17 to 18 years: 20-75 ng/dL  19 years and older: 8-60 ng/dL    Values by Eddi Stage:  Stage I (prepubertal)  Male: <2 to 20 ng/dL  Female: <2 to 20 ng/dL    Stage II  Male: 8 to 66 ng/dL  Female: <2 to 47 ng/dL    Stage III  Male: 26 to 800 ng/dL  Female: 17 to 75 ng/dL    Stage IV  Male: 85 to 1200 ng/dL  Female: 20 to 75 ng/dL    Stage V                            (young adult)  Male: 300 to 950 ng/dL  Female: 12 to 60 ng/dL    Puberty onset (transition from Eddi Stage I to Eddi Stage II) occurs for boys at a median age of 11.5 years and for girls at median age of 10.5 years. There is evidence that it may occur up to 1 year earlier in obese girls and in  girls. For boys there is no definite proven relationship between puberty onset and body weight or ethnic origin. Progression through Eddi stages is variable. Eddi Stage V (young adult) should be reached by age 18.                 Video-Visit Details    Video Start Time: 2:22 PM    Type of service:  Video Visit    Video End Time:2:40 PM    Originating Location (pt. Location): Home        Distant Location (provider location):  On-site    Platform used for Video Visit: Edy

## 2022-11-29 ENCOUNTER — DOCUMENTATION ONLY (OUTPATIENT)
Dept: PSYCHOLOGY | Facility: CLINIC | Age: 22
End: 2022-11-29

## 2022-11-29 ENCOUNTER — VIRTUAL VISIT (OUTPATIENT)
Dept: PSYCHOLOGY | Facility: CLINIC | Age: 22
End: 2022-11-29
Attending: STUDENT IN AN ORGANIZED HEALTH CARE EDUCATION/TRAINING PROGRAM
Payer: COMMERCIAL

## 2022-11-29 DIAGNOSIS — F33.0 MDD (MAJOR DEPRESSIVE DISORDER), RECURRENT EPISODE, MILD (H): Primary | ICD-10-CM

## 2022-11-29 DIAGNOSIS — F64.9 GENDER DYSPHORIA: ICD-10-CM

## 2022-11-29 PROCEDURE — 90837 PSYTX W PT 60 MINUTES: CPT | Mod: 95 | Performed by: SOCIAL WORKER

## 2022-11-29 ASSESSMENT — COLUMBIA-SUICIDE SEVERITY RATING SCALE - C-SSRS
TOTAL  NUMBER OF INTERRUPTED ATTEMPTS SINCE LAST CONTACT: NO
2. HAVE YOU ACTUALLY HAD ANY THOUGHTS OF KILLING YOURSELF?: NO
TOTAL  NUMBER OF ABORTED OR SELF INTERRUPTED ATTEMPTS SINCE LAST CONTACT: NO
6. HAVE YOU EVER DONE ANYTHING, STARTED TO DO ANYTHING, OR PREPARED TO DO ANYTHING TO END YOUR LIFE?: NO
SUICIDE, SINCE LAST CONTACT: NO
ATTEMPT SINCE LAST CONTACT: NO
1. SINCE LAST CONTACT, HAVE YOU WISHED YOU WERE DEAD OR WISHED YOU COULD GO TO SLEEP AND NOT WAKE UP?: NO

## 2022-11-30 NOTE — PROGRESS NOTES
"[facility]   Mental Health & Addiction Services     Progress Note - Initial Visit    Patient  Name:  Iker Edge Date: 2022         Service Type: Individual     Visit Start Time: 11:04 Visit End Time: 12:00    Visit #: 1    Attendees: Client    Service Modality:  Video Visit:      Provider verified identity through the following two step process.  Patient provided:  Patient photo, Patient  and Patient address    Telemedicine Visit: The patient's condition can be safely assessed and treated via synchronous audio and visual telemedicine encounter.      Reason for Telemedicine Visit: Services only offered telehealth    Originating Site (Patient Location): Patient's home    Distant Site (Provider Location): Provider Remote Setting- Home Office    Consent:  The patient/guardian has verbally consented to: the potential risks and benefits of telemedicine (video visit) versus in person care; bill my insurance or make self-payment for services provided; and responsibility for payment of non-covered services.     Patient would like the video invitation sent by:  My Chart    Mode of Communication:  Video Conference via [employer]    Distant Location (Provider):  Off-site    As the provider I attest to compliance with applicable laws and regulations related to telemedicine.    DATA:   Interactive Complexity: No   Crisis: No     Presenting Concerns/  Current Stressors: \"Depression. Compulsive thoughts of self harm.\"  Does not think she can harm self and want to address these in sessions. Reports the problems began early in September this year. Felt the depression was getting worse but she can't pin point what made it worse. Experiencing depression since age 12-13. Patient has attempted to resolve these concerns in the past through Medications and therapy. last time in therapy was about 2 years. Patient would like to have less intense SI/feel safe; less depressed, express feelings and learn how to " cope with depression.Taking medications and has seen a therapist.    ASSESSMENT:  Mental Status Assessment:  Appearance:   Appropriate   Eye Contact:   Good   Psychomotor Behavior: Normal   Attitude:   Cooperative   Orientation:   Person Place Time Situation  Speech   Rate / Production: Normal/ Responsive   Volume:  Normal   Mood:    Normal  Affect:    Appropriate   Thought Content:  Clear   Thought Form:  Coherent  Logical   Insight:    Good     Safety Issues and Plan for Safety and Risk Management:   [place] Suicide Severity Rating Scale (Short Version)  [place] Suicide Severity Rating (Short Version) 11/29/2022   1. Wish to be Dead (Since Last Contact) 0   2. Non-Specific Active Suicidal Thoughts (Since Last Contact) 0   Actual Attempt (Since Last Contact) 0   Has subject engaged in non-suicidal self-injurious behavior? (Since Last Contact) 0   Interrupted Attempts (Since Last Contact) 0   Aborted or Self-Interrupted Attempt (Since Last Contact) 0   Preparatory Acts or Behavior (Since Last Contact) 0   Suicide (Since Last Contact) 0   Calculated C-SSRS Risk Score (Since Last Contact) No Risk Indicated     Patient denies current fears or concerns for personal safety.  Patient denies current or recent suicidal ideation or behaviors.  Patient denies current or recent homicidal ideation or behaviors.  Patient denies current or recent self injurious behavior or ideation.  Patient denies other safety concerns.  Recommended that patient call 911 or go to the local ED should there be a change in any of these risk factors.  Patient reports there are no firearms in the house.     Diagnostic Criteria:  Major Depressive Disorder   - Depressed mood. Note: In children and adolescents, can be irritable mood.     - Diminished interest or pleasure in all, or almost all, activities.    - Fatigue or loss of energy.    - Diminished ability to think or concentrate, or indecisiveness.    - Recurrent thoughts of death (not just fear  of dying), recurrent suicidal ideation without a specific plan, or a suicide attempt or a specific plan for committing suicide.   B) The symptoms cause clinically significant distress or impairment in social, occupational, or other important areas of functioning  C) The episode is not attributable to the physiological effects of a substance or to another medical condition  D) The occurence of major depressive episode is not better explained by other thought / psychotic disorders  E) There has never been a manic episode or hypomanic episode    DSM5 Diagnoses: (Sustained by DSM5 Criteria Listed Above)  Diagnoses: 296.31 (F33.0) Major Depressive Disorder, Recurrent Episode, Mild _ and With anxious distress  Psychosocial & Contextual Factors: Depression, family issues, community issues- feelings unwanted due to who she is.   WHODAS 2.0 (12 item):   WHODAS 2.0 Total Score 10/31/2019 11/29/2022   Total Score 28 25   Total Score MyChart - 25     Intervention:  AIDET was performed. working rapport has started. Assessment was initiated.  Future appts were scheduled.  Collateral Reports Completed:  Routed note to PCP  PLAN: (Homework, other):  1. Provider will continue Diagnostic Assessment.  Patient was given the following to do until next session: Patient will continue to practice her coping skills learned in past therapy.     2. Provider recommended the following referrals: not discussed today    3.  Suicide Risk and Safety Concerns were assessed for Iker Edge: has had SI since childhood. Passive but wants to prevent them from worsening. Agreed to use the National Hotline : 382 and develop a safety plan with writer upon the completion of her DA.     Francisco Smith, [facility]  November 29, 2022

## 2022-12-07 ENCOUNTER — VIRTUAL VISIT (OUTPATIENT)
Dept: PSYCHOLOGY | Facility: CLINIC | Age: 22
End: 2022-12-07
Payer: COMMERCIAL

## 2022-12-07 DIAGNOSIS — F33.1 MDD (MAJOR DEPRESSIVE DISORDER), RECURRENT EPISODE, MODERATE (H): Primary | ICD-10-CM

## 2022-12-07 PROCEDURE — 90791 PSYCH DIAGNOSTIC EVALUATION: CPT | Mod: 95 | Performed by: SOCIAL WORKER

## 2022-12-07 ASSESSMENT — COLUMBIA-SUICIDE SEVERITY RATING SCALE - C-SSRS
1. SINCE LAST CONTACT, HAVE YOU WISHED YOU WERE DEAD OR WISHED YOU COULD GO TO SLEEP AND NOT WAKE UP?: NO
SUICIDE, SINCE LAST CONTACT: NO
6. HAVE YOU EVER DONE ANYTHING, STARTED TO DO ANYTHING, OR PREPARED TO DO ANYTHING TO END YOUR LIFE?: NO
ATTEMPT SINCE LAST CONTACT: NO
2. HAVE YOU ACTUALLY HAD ANY THOUGHTS OF KILLING YOURSELF?: NO
TOTAL  NUMBER OF INTERRUPTED ATTEMPTS SINCE LAST CONTACT: NO
TOTAL  NUMBER OF ABORTED OR SELF INTERRUPTED ATTEMPTS SINCE LAST CONTACT: NO

## 2022-12-09 NOTE — PROGRESS NOTES
"    [facility]    PATIENT'S NAME: Iker Edge  PREFERRED NAME: Brit  PRONOUNS: she/her/hers     MRN: [id number]  : 2000  ADDRESS: [address] [place] [address]  ACCT. NUMBER:  [id number]  DATE OF SERVICE: 2022  START TIME: 10:00  END TIME: 11:00  PREFERRED PHONE: [phone]  May we leave a program related message: Yes  SERVICE MODALITY:  Video Visit:      Provider verified identity through the following two step process.  Patient provided:  Patient photo, Patient  and Patient address    Telemedicine Visit: The patient's condition can be safely assessed and treated via synchronous audio and visual telemedicine encounter.      Reason for Telemedicine Visit: Services only offered telehealth    Originating Site (Patient Location): Patient's home    Distant Site (Provider Location): Provider Remote Setting- Home Office    Consent:  The patient/guardian has verbally consented to: the potential risks and benefits of telemedicine (video visit) versus in person care; bill my insurance or make self-payment for services provided; and responsibility for payment of non-covered services.     Patient would like the video invitation sent by:  My Chart    Mode of Communication:  Video Conference via [employer]    Distant Location (Provider):  Off-site    As the provider I attest to compliance with applicable laws and regulations related to telemedicine.    UNIVERSAL ADULT Mental Health DIAGNOSTIC ASSESSMENT    Identifying Information:   Patient is a 22 year old,   individual.  Patient was referred for an assessment by referring provider at [facility]. Patient's PCP is Paul Pacheco MD . She was referred by Dai Gale MD from the same clinic. Patient attended the session alone.    Chief Complaint:   The reason for seeking services at this time is: \"Depression. Compulsive thoughts of self harm.\".  Does not think she can harm self but these are " concerns to her. The problem(s) began 09/01/22-this is around the time where depression got worse due work related issues. However, has depression since age 10-13.    Patient has attempted to resolve these concerns in the past through Medications and therapy. last time in therapy was about 2 years. .    Goal:  To have  less intense SI/feel safe; less depressed, express feelings and learn how to cope with depression.    Social/Family History:  Patient reported they grew up in [place], [place].  They were raised by biological parents  .  Parents were always together. Parents are still .  Patient reported that their childhood was good.  Patient described their current relationships with family of origin as inconsistent with dad; Good with mom.     The patient describes their cultural background as   Cultural influences and impact on patient's life structure, values, norms, and healthcare: Family are non-practicing Lutherans. [place] environment..  Contextual influences on patient's health include: Contextual Factors: Individual Factors : depression, Anxiety, sleep issues, less appetite. not good for personal growth. some change at work place. , Family Factors : mom has depression, patient is her primary support. Dad's personality ; dad quit his job. patient is supporting family from her current job , Community Factors :How perceived due to her sexual orientation., Societal Factors : Unfairness, political views about who deserves to be a human or not and Economic Factors : In process of leaving the current job.    These factors will be addressed in the Preliminary Treatment plan. Patient identified their preferred language to be English. Patient reported they does not need the assistance of an  or other support involved in therapy.     Patient reported had no significant delays in developmental tasks.   Patient's highest education level was associate degree / vocational  certificate  .  Patient identified the following learning problems: speech.  Had a speech therapy in 4th and 5th grades.  Modifications will not be used to assist communication in therapy. Patient reports she is  able to understand written materials.    Patient reported the following relationship history:2 .  Patient's current relationship status is has a partner or significant other for 4 year:  Living situation; Relationship could be better but stressful due to work and school.  Patient identified their sexual orientation as bi-sexual.  Patient reported having   child(josie). Patient identified partner; friends as part of their support system.  Patient identified the quality of these relationships as inconsistent,  .      Patient's current living/housing situation involves staying with someone.  The immediate members of family and household include Tatum Edge, 58,Mother;Partner, dad, and they report that housing is stable.    Patient is currently employed fulltime.  Patient reports their finances are obtained through employment; parents; partner. Patient does identify finances as a current stressor.      Patient reported that they have been involved with the legal system.  Legally changed my name this year.  Patient does not report being under probation/ parole/ jurisdiction. They are not under any current court jurisdiction. .    Patient's Strengths and Limitations:  Patient identified the following strengths or resources that will help them succeed in treatment: commitment to health and well being, friends / good social support, family support, insight, sense of humor and work ethic. Things that may interfere with the patient's success in treatment include: none identified.     Assessments:  The following assessments were completed by patient for this visit:  PHQ2:   PHQ-2 ( 1999 [employer]) 8/30/2019 4/25/2018 6/16/2016 6/9/2014   Q1: Little interest or pleasure in doing things 3 0 0 0   Q2: Feeling down, depressed  or hopeless 2 0 0 0   PHQ-2 Score 5 0 0 0   PHQ-2 Total Score (12-17 Years)- Positive if 3 or more points; Administer PHQ-A if positive 5 - - -     PHQ9:   PHQ-9 SCORE 2/24/2020 6/18/2020 3/24/2021 4/26/2021 11/8/2021 5/16/2022 11/28/2022   PHQ-9 Total Score 7 3 9 10 6 4 14     GAD2:   LISA-2 11/29/2022   Feeling nervous, anxious, or on edge 1   Not being able to stop or control worrying 1   LISA-2 Total Score 2     GAD7:   LISA-7 SCORE 12/16/2019 2/24/2020 6/18/2020 4/26/2021 11/8/2021 5/16/2022 11/28/2022   Total Score 10 9 6 11 7 5 7     CAGE-AID:   CAGE-AID Total Score 11/29/2022   Total Score 2   Total Score MyChart 2 (A total score of 2 or greater is considered clinically significant)     PROMIS 10-Global Health (all questions and answers displayed):   PROMIS 10 11/29/2022   In general, would you say your health is: Good   In general, would you say your quality of life is: Very good   In general, how would you rate your physical health? Very good   In general, how would you rate your mental health, including your mood and your ability to think? Poor   In general, how would you rate your satisfaction with your social activities and relationships? Poor   In general, please rate how well you carry out your usual social activities and roles Very good   To what extent are you able to carry out your everyday physical activities such as walking, climbing stairs, carrying groceries, or moving a chair? Completely   How often have you been bothered by emotional problems such as feeling anxious, depressed or irritable? Always   How would you rate your fatigue on average? None   How would you rate your pain on average?   0 = No Pain  to  10 = Worst Imaginable Pain 0   In general, would you say your health is: 3   In general, would you say your quality of life is: 4   In general, how would you rate your physical health? 4   In general, how would you rate your mental health, including your mood and your ability to think? 1    In general, how would you rate your satisfaction with your social activities and relationships? 1   In general, please rate how well you carry out your usual social activities and roles. (This includes activities at home, at work and in your community, and responsibilities as a parent, child, spouse, employee, friend, etc.) 4   To what extent are you able to carry out your everyday physical activities such as walking, climbing stairs, carrying groceries, or moving a chair? 5   In the past 7 days, how often have you been bothered by emotional problems such as feeling anxious, depressed, or irritable? 5   In the past 7 days, how would you rate your fatigue on average? 1   In the past 7 days, how would you rate your pain on average, where 0 means no pain, and 10 means worst imaginable pain? 0   Global Mental Health Score 7   Global Physical Health Score 19   PROMIS TOTAL - SUBSCORES 26   Some recent data might be hidden     [place] Suicide Severity Rating Scale (Lifetime/Recent)  [place] Suicide Severity Rating (Lifetime/Recent) 10/31/2019   Wish to be Dead (Lifetime) Yes   Non-Specific Active Suicidal Thoughts (Lifetime) Yes   RETIRED: 1. Wish to be Dead (Recent) Yes   RETIRED: 2. Non-Specific Active Suicidal Thoughts (Recent) Yes   3. Active Suicidal Ideation with any Methods (Not Plan) Without Intent to Act (Lifetime) No   RETIRED: 3. Active Suicidal Ideation with any Methods (Not Plan) Without Intent to Act (Recent) No   RETIRE: 4. Active Suicidal Ideation with Some Intent to Act, Without Specific Plan (Lifetime) No   4. Active Suicidal Ideation with Some Intent to Act, Without Specific Plan (Recent) No   RETIRE: 5. Active Suicidal Ideation with Specific Plan and Intent (Lifetime) No   RETIRED: 5. Active Suicidal Ideation with Specific Plan and Intent (Recent) No   Actual Attempt (Lifetime) No   Actual Attempt (Past 3 Months) No   Has subject engaged in non-suicidal self-injurious behavior? (Lifetime) No   Has  subject engaged in non-suicidal self-injurious behavior? (Past 3 Months) No   Interrupted Attempts (Lifetime) No   Interrupted Attempts (Past 3 Months) No   Aborted or Self-Interrupted Attempt (Lifetime) No   Aborted or Self-Interrupted Attempt (Past 3 Months) No   Preparatory Acts or Behavior (Lifetime) No   Preparatory Acts or Behavior (Past 3 Months) No     [place] Suicide Severity Rating Scale (Short Version)  [place] Suicide Severity Rating (Short Version) 11/29/2022 12/7/2022   1. Wish to be Dead (Since Last Contact) 0 0   2. Non-Specific Active Suicidal Thoughts (Since Last Contact) 0 0   Actual Attempt (Since Last Contact) 0 0   Has subject engaged in non-suicidal self-injurious behavior? (Since Last Contact) 0 0   Interrupted Attempts (Since Last Contact) 0 0   Aborted or Self-Interrupted Attempt (Since Last Contact) 0 0   Preparatory Acts or Behavior (Since Last Contact) 0 0   Suicide (Since Last Contact) 0 0   Calculated C-SSRS Risk Score (Since Last Contact) No Risk Indicated No Risk Indicated     Personal and Family Medical History:  Patient does report a family history of mental health concerns.  Patient reports family history includes Anemia in her paternal grandmother; Anxiety Disorder in her mother; Depression in her father and mother; Eye Disorder in her paternal grandmother; Gastrointestinal Disease in her mother; Heart Disease in her father; Hernia in her father; Hypertension in her father; Substance Abuse in her father; Thyroid Disease in her mother..     Patient does report Mental Health Diagnosis and/or Treatment.  Patient Patient reported the following previous diagnoses which include(s): an Anxiety Disorder and Depression.  Patient reported symptoms began at age 10 or 11.   Patient has received mental health services in the past: therapy with  in the  community when she was [age] and [facility] as a young adult  and primary care provider at [facility] for medications..  Psychiatric  Hospitalizations: None.  Patient denies a history of civil commitment.  Patient is not receiving other mental health services.      Patient has had a physical exam to rule out medical causes for current symptoms.  Date of last physical exam was greater than a year ago and client was encouraged to schedule an exam with PCP. The patient has a [facility] Provider, who is named Paul Pacheco: will call to schedule her Annual wellness visit. Patient reports no current medical concerns and no current dental concerns; has had a bad experience with dentist. Patient denies any issues with pain..   There are significant appetite / nutritional concerns / weight changes: appetite and weight changes. Tends to lose weight and not eat less.  Patient does not report a history of head injury / trauma / cognitive impairment.       Current Outpatient Medications:      estradiol (ESTRACE) 2 MG tablet, Take 1 tablet (2 mg) by mouth 2 times daily, Disp: 180 tablet, Rfl: 3     fluticasone (FLONASE) 50 MCG/ACT nasal spray, Spray 1 spray into both nostrils daily (Patient not taking: Reported on 11/28/2022), Disp: 16 g, Rfl: 3     olopatadine (PATADAY) 0.2 % ophthalmic solution, Place 1 drop into both eyes daily For itchy eyes due to allrgies (Patient not taking: Reported on 11/28/2022), Disp: 2.5 mL, Rfl: 1     order for DME, Equipment being ordered: Compression stockings - upper thigh-high - 20-30 (maximum compression) - one pair, Disp: 1 each, Rfl: 1     sertraline (ZOLOFT) 50 MG tablet, Take 1 tablet (50 mg) by mouth daily, Disp: 90 tablet, Rfl: 3     spironolactone (ALDACTONE) 100 MG tablet, Take 1.5 tablets (150 mg) by mouth 2 times daily, Disp: 270 tablet, Rfl: 3    Medication Adherence:  Patient reports taking.  taking prescribed medications as prescribed.    Patient Allergies:  No Known Allergies    Medical History:    Past Medical History:   Diagnosis Date     Unspecified asthma(493.90)     reactive airway      Current Mental Status Exam:   Appearance:  Appropriate    Eye Contact:  Good   Psychomotor:  Normal       Gait / station:  no problem  Attitude / Demeanor: Cooperative   Speech      Rate / Production: Normal/ Responsive      Volume:  Normal  volume      Language:  intact  Mood:   Normal Sad  Ambivalence  Affect:   Tearful Worrisome    Thought Content: Clear   Thought Process: Coherent  Logical       Associations: No loosening of associations  Insight:   Good   Judgment:  Intact   Orientation:  Person Place Time Situation  Attention/concentration: Good    Substance Use:  Patient did report a family history of substance use concerns; see medical history section for details: dad drinks a lot and he smokes  Gardena and tobacco.  Patient has not received chemical dependency treatment in the past.  Patient has not ever been to detox.      Patient is not currently receiving any chemical dependency treatment.       Substance History of use Age of first use Date of last use     Pattern and duration of use (include amounts and frequency)   Alcohol used in the past   17 11/24/2022  up to 2 rarely. Used to drink more up to 2-3/day; until June this year.    Cannabis   used in the past 14 08/01/16 Once at a friend's Birthday.   Amphetamines   never used     REPORTS SUBSTANCE USE: N/A   Cocaine/crack    never used       REPORTS SUBSTANCE USE: N/A   Hallucinogens never used         REPORTS SUBSTANCE USE: N/A   Inhalants never used         REPORTS SUBSTANCE USE: N/A   Heroin never used         REPORTS SUBSTANCE USE: N/A   Other Opiates never used     REPORTS SUBSTANCE USE: N/A   Benzodiazepine   never used     REPORTS SUBSTANCE USE: N/A   Barbiturates never used     REPORTS SUBSTANCE USE: N/A   Over the counter meds never used     REPORTS SUBSTANCE USE: N/A   Caffeine currently use 16    12/06/2022  Up to 4 cups a day   Nicotine  never used     REPORTS SUBSTANCE USE: N/A   Other substances not listed above:  Identify:  never used      REPORTS SUBSTANCE USE: N/A     Patient reported the following problems as a result of their substance use: relationship problems.    Substance Use: No symptoms    Based on the positive CAGE score and clinical interview there  are not indications of drug or alcohol abuse as of June 2022    Significant Losses / Trauma / Abuse / Neglect Issues:   Patient did not  serve in the .  There are indications or report of significant loss, trauma, abuse or neglect issues related to: are no indications and client denies any losses, trauma, abuse, or neglect concerns.  Concerns for possible neglect are not present.     Safety Assessment:   Patient denies current homicidal ideation and behaviors.  Patient denies current self-injurious ideation and behaviors.    Patient denied risk behaviors associated with substance use.  Patient denies any high risk behaviors associated with mental health symptoms.  Patient reports the following current concerns for their personal safety: None.  Patient reports there are not firearms in the house.      History of Safety Concerns:  Patient denied a history of homicidal ideation.     Patient denied a history of personal safety concerns.    Patient denied a history of assaultive behaviors.    Patient denied a history of sexual assault behaviors.     Patient denied a history of risk behaviors associated with substance use.  Patient denies any history of high risk behaviors associated with mental health symptoms.  Patient reports the following protective factors: dedication to family or friends; effectively controls impulses; sense of belonging; purpose; abstinence from substances; living with other people; daily obligations; structured day; commitment to well being; sense of meaning    Risk Plan:  See Recommendations for Safety and Risk Management Plan    Review of Symptoms per patient report:   Depression: Change in sleep, Excessive or inappropriate guilt, Change in appetite, Suicidal  ideation, Feelings of hopelessness, Feelings of helplessness, Low self-worth, Feeling sad, down, or depressed, Withdrawn, Frequent crying and Anger outbursts  Sherry:  Elevated mood, Irritability, Racing thoughts, Increased activity, Hypersexual, Restlessness, Distractibility and Impulsiveness  Psychosis: No Symptoms  Anxiety: Excessive worry, Nervousness, Social anxiety, Sleep disturbance, Ruminations, Poor concentration, Irritability and Anger outbursts  Panic:  No symptoms  Post Traumatic Stress Disorder:  No Symptoms   Eating Disorder: Restriction and Weight change  ADD / ADHD:  No symptoms  Conduct Disorder: No symptoms  Autism Spectrum Disorder: No symptoms  Obsessive Compulsive Disorder: No Symptoms    Patient reports the following compulsive behaviors and treatment history: None reported.      Diagnostic Criteria:   Unspecified Anxiety Disorder , Symptoms characteristic of an anxiety disorder that caused clinically significant distress or impairment in social, occupational, or other important areas of functioning predominate but do not meet the full criteria for any of the disorders of the anxiety disorders diagnostic class.    Functional Status:  Patient reports the following functional impairments:  management of the household and or completion of tasks and relationship(s); depression, anxiety    Nonprogrammatic care:  Patient is requesting basic services to address current mental health concerns.    Clinical Summary:  1. Reason for assessment: Depression, SI with no intent.  2. Psychosocial, Cultural and Contextual Factors: long standing history of depression since age 12-13. Can not pin point what was happening around that age. Had been doing pretty well with mediations and therapy. Last time in therapy was 2 years ago. However, last September she felt the symptoms have gone worse.  Has been experiencing SI off and on. She would like have less intense SI/feel safe; less depressed, express feelings and  learn how to cope with depression.     3. Principal DSM5 Diagnoses  (Sustained by DSM5 Criteria Listed Above):   296.32 (F33.1) Major Depressive Disorder, Recurrent Episode, Moderate _ and With anxious distress.  4. Other Diagnoses that is relevant to services:   300.09 (F41.8) Other Specified Anxiety Disorder .  5. Provisional Diagnosis:  296.32 (F33.1) Major Depressive Disorder, Recurrent Episode, Moderate _ and With anxious distress as evidenced by Clinical inventories, chart review, clinical interview, mental status..  6. Prognosis: Return to Baseline Functioning.  7. Likely consequences of symptoms if not treated: the symptoms would get worse which will then affect her daily levels of functionning.  8. Client strengths include:  committed to sobriety, goal-focused, good listener, insightful, intelligent, open to suggestions / feedback, responsible parent, support of family, friends and providers, supportive and work history .     Recommendations:   1. Plan for Safety and Risk Management:  Safety and Risk: Recommended that patient call 911 or go to the local ED should there be a change in any of these risk factors.. Or call [phone]. Also a written safety plan will be developed as necessary       Report to child / adult protection services was NA.     2. Patient's identified mental health concerns with a cultural influence will be addressed by patient.     3. Initial Treatment will focus on:    Depressed Mood - process thoughts and feelings and develop healthy coping skills  Risk Management / Safety Concerns related to: Suicidal ideation.     4. Resources/Service Plan:    services are not indicated.   Modifications to assist communication are not indicated.   Additional disability accommodations are not indicated.      5. Collaboration:   Collaboration / coordination of treatment will be initiated with the following  support professionals: primary care physician.      6.  Referrals:   The following  referral(s) will be initiated: no referral needed at this time. . Next Scheduled Appointment: 1/03/2023- patient might be called sooner if any cancellation.      A Release of Information has been obtained for the following: No FABIANA needed for [facility] providers.     Emergency Contact was obtained.      Clinical Substantiation/medical necessity for the above recommendations:  Nonprogrammatic care:  Patient is requesting basic services to address current mental health concerns.    7. SEGUN: No current SEGUN concerns- has cut down her alcohol intake greatly since early in Summer.    8. Records:   These were reviewed at time of assessment.   Information in this assessment was obtained from the medical record and provided by patient who is a good historian. Patient will have open access to their mental health medical record.    9. Interactive Complexity: No     Provider Name/ Credentials:  BREE Schaefre- ANGELO; [facility] 12/7/2022

## 2023-01-02 ASSESSMENT — PATIENT HEALTH QUESTIONNAIRE - PHQ9
SUM OF ALL RESPONSES TO PHQ QUESTIONS 1-9: 12
SUM OF ALL RESPONSES TO PHQ QUESTIONS 1-9: 12
10. IF YOU CHECKED OFF ANY PROBLEMS, HOW DIFFICULT HAVE THESE PROBLEMS MADE IT FOR YOU TO DO YOUR WORK, TAKE CARE OF THINGS AT HOME, OR GET ALONG WITH OTHER PEOPLE: SOMEWHAT DIFFICULT

## 2023-01-03 ENCOUNTER — VIRTUAL VISIT (OUTPATIENT)
Dept: PSYCHOLOGY | Facility: CLINIC | Age: 23
End: 2023-01-03
Payer: COMMERCIAL

## 2023-01-03 DIAGNOSIS — F41.9 ANXIETY DISORDER, UNSPECIFIED TYPE: ICD-10-CM

## 2023-01-03 DIAGNOSIS — F33.1 MDD (MAJOR DEPRESSIVE DISORDER), RECURRENT EPISODE, MODERATE (H): Primary | ICD-10-CM

## 2023-01-03 PROCEDURE — 90837 PSYTX W PT 60 MINUTES: CPT | Mod: 95 | Performed by: SOCIAL WORKER

## 2023-01-03 NOTE — PROGRESS NOTES
[facility]                                     Progress Note    Patient Name: Iker Edge  Date: 2023        Service Type: Individual      Session Start Time: 12:03 Session End Time: 12:56     Session Length: 53    Session #:1    Attendees: Client    Service Modality:  Video Visit:      Provider verified identity through the following two step process.  Patient provided:  Patient photo, Patient  and Patient is known previously to provider    Telemedicine Visit: The patient's condition can be safely assessed and treated via synchronous audio and visual telemedicine encounter.      Reason for Telemedicine Visit: Services only offered telehealth    Originating Site (Patient Location): Patient's home    Distant Site (Provider Location): Provider Remote Setting    Consent:  The patient/guardian has verbally consented to: the potential risks and benefits of telemedicine (video visit) versus in person care; bill my insurance or make self-payment for services provided; and responsibility for payment of non-covered services.     Patient would like the video invitation sent by:  My Chart    Mode of Communication:  Video Conference via [employer]    Distant Location (Provider):  Off-site    As the provider I attest to compliance with applicable laws and regulations related to telemedicine.    DATA  Interactive Complexity: No  Crisis: No      Progress Since Last Session (Related to Symptoms / Goals / Homework):   Symptoms: No change :still has passive SI, stil has developed a safety plan    Homework: Partially completed      Episode of Care Goals: Satisfactory progress - ACTION (Actively working towards change); Intervened by reinforcing change plan / affirming steps taken     Current / Ongoing Stressors and Concerns: Patient presented with MDD and SI. She had been doing pretty well with medications and therapy. Last time in therapy was 2 years ago. However, last September she felt the symptoms  have gone worse.  Has been experiencing SI off and on. She would like have less intense SI/feel safe; less depressed, express feelings and learn how to cope with depression. Today, she and writer discussed the results from the DA. Patient might need a clarification of her diagnoses. Reported some symptoms that mimic bipolar disorder. Though due to current change with hormones involved, writer and patient decided to focus on depression, keeping her SI under control and anxiety. Will then determine when to see someone for the clarification of her diagnoses. Patient and writer completed her safety plan today to help with the ongoing passive SI.Her next visit is on 1/24/2023.     Treatment Objective(s) Addressed in This Session:   Identify negative self-talk and behaviors: challenge core beliefs, myths, and actions     Intervention:     Situation        Automatic Thoughts  Cognitive Distortions      Feelings        Behavior        Questioning Thoughts          MI Intervention: Co-Developed Goal: targeting anxiety, depression,SI, Expressed Empathy/Understanding, Supported Autonomy, Collaboration, Evocation, Permission to raise concern or advise and Open-ended questions     Change Talk Expressed by the Patient: Taking steps    Provider Response to Change Talk: E - Evoked more info from patient about behavior change, A - Affirmed patient's thoughts, decisions, or attempts at behavior change, R - Reflected patient's change talk and S - Summarized patient's change talk statements    Assessments completed prior to visit: 12/07/2022    The following assessments were completed by patient for this visit:  PHQ2:   PHQ-2 ( 1999 [employer]) 8/30/2019 4/25/2018 6/16/2016 6/9/2014   Q1: Little interest or pleasure in doing things 3 0 0 0   Q2: Feeling down, depressed or hopeless 2 0 0 0   PHQ-2 Score 5 0 0 0   PHQ-2 Total Score (12-17 Years)- Positive if 3 or more points; Administer PHQ-A if positive 5 - - -     PHQ9:   PHQ-9 SCORE  6/18/2020 3/24/2021 4/26/2021 11/8/2021 5/16/2022 11/28/2022 1/2/2023   PHQ-9 Total Score MyChart - - - - - - 12 (Moderate depression)   PHQ-9 Total Score 3 9 10 6 4 14 12     GAD2:   LISA-2 11/29/2022 1/2/2023   Feeling nervous, anxious, or on edge 1 1   Not being able to stop or control worrying 1 0   LISA-2 Total Score 2 1     GAD7:   LISA-7 SCORE 12/16/2019 2/24/2020 6/18/2020 4/26/2021 11/8/2021 5/16/2022 11/28/2022   Total Score 10 9 6 11 7 5 7     CAGE-AID:   CAGE-AID Total Score 11/29/2022   Total Score 2   Total Score MyChart 2 (A total score of 2 or greater is considered clinically significant)     [place] Suicide Severity Rating Scale (Lifetime/Recent)  [place] Suicide Severity Rating (Lifetime/Recent) 10/31/2019   Wish to be Dead (Lifetime) Yes   Non-Specific Active Suicidal Thoughts (Lifetime) Yes   RETIRED: 1. Wish to be Dead (Recent) Yes   RETIRED: 2. Non-Specific Active Suicidal Thoughts (Recent) Yes   3. Active Suicidal Ideation with any Methods (Not Plan) Without Intent to Act (Lifetime) No   RETIRED: 3. Active Suicidal Ideation with any Methods (Not Plan) Without Intent to Act (Recent) No   RETIRE: 4. Active Suicidal Ideation with Some Intent to Act, Without Specific Plan (Lifetime) No   4. Active Suicidal Ideation with Some Intent to Act, Without Specific Plan (Recent) No   RETIRE: 5. Active Suicidal Ideation with Specific Plan and Intent (Lifetime) No   RETIRED: 5. Active Suicidal Ideation with Specific Plan and Intent (Recent) No   Actual Attempt (Lifetime) No   Actual Attempt (Past 3 Months) No   Has subject engaged in non-suicidal self-injurious behavior? (Lifetime) No   Has subject engaged in non-suicidal self-injurious behavior? (Past 3 Months) No   Interrupted Attempts (Lifetime) No   Interrupted Attempts (Past 3 Months) No   Aborted or Self-Interrupted Attempt (Lifetime) No   Aborted or Self-Interrupted Attempt (Past 3 Months) No   Preparatory Acts or Behavior (Lifetime) No   Preparatory  Acts or Behavior (Past 3 Months) No       ASSESSMENT: Current Emotional / Mental Status (status of significant symptoms):   Risk status (Self / Other harm or suicidal ideation)   Patient denies current fears or concerns for personal safety.   Patient denies current or recent suicidal ideation or behaviors.   Patient denies current or recent homicidal ideation or behaviors.   Patient denies current or recent self injurious behavior or ideation.   Patient denies other safety concerns.   Patient reports there has been no change in risk factors since their last session.     Patient reports there has been no change in protective factors since their last session.     Recommended that patient call 911 or go to the local ED should there be a change in any of these risk factors.     Appearance:   Appropriate    Eye Contact:   Good    Psychomotor Behavior: Normal    Attitude:   Cooperative    Orientation:   Person Place Time Situation   Speech    Rate / Production: Normal     Volume:  Normal    Mood:    Normal   Affect:    Appropriate    Thought Content:  Clear    Thought Form:  Coherent  Logical    Insight:    Good      Medication Review:   No changes to current psychiatric medication(s)     Medication Compliance:   Yes     Changes in Health Issues:   None reported     Chemical Use Review:   Substance Use: Chemical use reviewed, no active concerns identified      Tobacco Use: No current tobacco use.      Diagnosis:  1. MDD (major depressive disorder), recurrent episode, moderate (H)    2. Anxiety disorder, unspecified type      Collateral Reports Completed:   Routed note to PCP    PLAN: (Patient Tasks / Therapist Tasks / Other)  Patient will use her safety plan as needed.   Patient next visit is on 1/24    ANGELO Schaefer                                                ________________________________________________________________    Individual Treatment Plan    Patient's Name: Iker BARLOW Minesh  Date Of  Birth: 2000    Date of Creation: 1/03/2023    Date Treatment Plan Last Reviewed/Revised: 1/03/2023    DSM5 Diagnoses: 296.32 (F33.1) Major Depressive Disorder, Recurrent Episode, Moderate _ and With anxious distress or 300.00 (F41.9) Unspecified Anxiety Disorder     Psychosocial / Contextual Factors: Long standing history of depression since age 12-13. Can not pin point what was happening around that age.ongoing SI.     PROMIS (reviewed every 90 days): 26    Referral / Collaboration:  Referral to another professional/service is not indicated at this time..    Anticipated number of session for this episode of care: 9-12 sessions  Anticipation frequency of session: Biweekly  Anticipated Duration of each session: 53 or more minutes  Treatment plan will be reviewed in 90 days or when goals have been changed.     MeasurableTreatment Goal(s) related to diagnosis / functional impairment(s)  Goal 1: Patient will keep her depression under control in order to be productive      I will know I've met my goal when the level of depression of 4/4 is reduced  to 3/4 or better by the next review.       Objective #A (Patient Action)                           Status: Continued - Date(s):10/06/2022   Patient will Improve concentration, focus, and mindfulness in daily activities   Intervention(s)  Therapist will assign homework : practice CBT skills to challenge any distortions and incease confidence.   teach Healthy life styles: sleep, balance diet, exercise and personal hygiene.      Objective #B  Patient will use at least 3 coping skills for depression management in the next 12 weeks   Status: New - Date(s):1/03/2023  Intervention(s)  Therapist will assign homework : make a list of triggers and signs and discuss in the session for input.     Goal 2: Patient will develop healthy cognitive patterns and beliefs about self and the world that lead to alleviation and help prevent the relapse of depression symptoms.     I will know  I've met my goal when my level of my depression is reduced from 4/4 to 3/4 or better by the next review.       Objective #A (Patient Action)                          Patient will identify at least 4 stressors which contribute to feelings of depression.  Status: New - Date: 1/03/2023     Intervention(s)  Therapist will teach distraction skills. including seff soothing with the 5 senses.     Objective #B  Patient will Identify negative self-talk and behaviors: challenge core beliefs, myths, and actions.  Status: New - Date: 1/03/2023  Intervention(s)  Therapist will Introduce CBT skills.     Objective #C  Patient will Increase interest, engagement, and pleasure in doing things.  Status: New - Date: 1/03/2023  Intervention(s)  Therapist will Encourage patient to share identify and share thoughts and feelings to increase self confidence .     Goal 3: Client will will stabilize anxiety level while increasing ability to function on a daily basis.     I will know I've met my goal when my anxiety is reduced from 4/4 to 3/4 or better by the next review       Objective #A (Client Action)                Client will Increase interest, engagement, and pleasure in doing things.  Status: New - Date:1/03/2023     Intervention(s)  Therapist will provide educational materials on distraction activities.     Objective #B  Client will identify at least 4 fears / thoughts that contribute to feeling anxious.  Status: New - Date: 1/03/2023     Intervention(s)  Therapist will teach how to challenge negative thoughts using CBT skills including the 3 Cs.     Objective #C  Client will use thought-stopping strategy daily to reduce intrusive thoughts.  Status: New - Date:10/19/2022     Intervention(s)              Therapist will Teach how to use CBT: 3 Cs to challenge the NTs as they   present.   Objective #D (Patient Action)                          Status: New - Date: 1/03/2023     Intervention(s)  Therapist will teach emotional  "recognition/identification. related to behavior change.     Objective #E  Patient will use thought-stopping strategy daily to reduce intrusive thoughts.  Status: New - Date:1/03/2023  Intervention(s)  Therapist will assist the patient Identify any distortions and emotions that affect patient's productivity.     Patient has reviewed and agreed to the above plan.      Francisco Smith, LICSW  January 4, 2023        [facility]                                       Iker BARLOW Minesh     SAFETY PLAN:  Step 1: Warning signs / cues (Thoughts, images, mood, situation, behavior) that a crisis may be developing:    Thoughts: \"I'm a burden\", \"I can't do this anymore\" and \"Nothing makes it better\"    Images: obsessive thoughts of death or dying: \" Suicide\", flashbacks and visions of harm: Cutting, burning self.    Thinking Processes: intrusive thoughts (bothersome, unwanted thoughts that come out of nowhere): things done in the past; memories of poor living situation for a year about 5-6 years ago. , highly critical and negative thoughts: \" I don't deserve it, I am not doing enough, not good enough\" and depersonalization    Mood: worsening depression, hopelessness, intense anger, agitation and mood swings    Behaviors: isolating/withdrawing , using alcohol, can't stop crying, aggression, not taking care of my responsibilities and increasing frequency and duration of dissociation    Situations: bullying: from co workers( current); schoolmates( past), public shame: how she perceives self and relationship problems   Step 2: Coping strategies - Things I can do to take my mind off of my problems without contacting another person (relaxation technique, physical activity):    Distress Tolerance Strategies:  read a book: Any and Writing; video games    Physical Activities: go for a walk, gardening and stretching     Focus on helpful thoughts:  \"This is temporary\", \"I will get through this\" and \"It always passes\"  Step " 3: People and social settings that provide distraction:   Name: Jeremiah ( Partner) Phone:  [phone]   Name:  Emerita ( good friend) Phone:    [phone]    coffee shop and park   Step 4: Remind myself of people and things that are important to me and worth living for:  Partner  Step 5: When I am in crisis, I can ask these people to help me use my safety plan:  Name: Jeremiah ( Partner) Phone:  [phone]  Name:  Emerita ( good friend) Phone:    [phone]  Step 6: Making the environment safe:     remove alcohol and be around others  Step 7: Professionals or agencies I can contact during a crisis:    Suicide Prevention Lifeline: Call or Text 616   Local Crisis Services:  [facility] crisis line [phone].    Call 911 or go to my nearest emergency department.   I helped develop this safety plan and agree to use it when needed.  I have been given a copy of this plan.   Client signature _________________________________________________________________  Today s date:  1/3/2023  Completed by Provider Name/ Credentials: ANGELO Schaefer January 3, 2023  Adapted from Safety Plan Template 2008 Jazlyn Carreon and Bryan Weber is reprinted with the express permission of the authors.  No portion of the Safety Plan Template may be reproduced without the express, written permission.  You can contact the authors at [email or web address] or juan [email or web address].  Answers for HPI/ROS submitted by the patient on 1/2/2023  If you checked off any problems, how difficult have these problems made it for you to do your work, take care of things at home, or get along with other people?: Somewhat difficult  PHQ9 TOTAL SCORE: 12

## 2023-01-24 ENCOUNTER — VIRTUAL VISIT (OUTPATIENT)
Dept: PSYCHOLOGY | Facility: CLINIC | Age: 23
End: 2023-01-24
Payer: COMMERCIAL

## 2023-01-24 DIAGNOSIS — F33.1 MDD (MAJOR DEPRESSIVE DISORDER), RECURRENT EPISODE, MODERATE (H): Primary | ICD-10-CM

## 2023-01-24 PROCEDURE — 90837 PSYTX W PT 60 MINUTES: CPT | Mod: 95 | Performed by: SOCIAL WORKER

## 2023-01-24 ASSESSMENT — ENCOUNTER SYMPTOMS
HEARTBURN: 0
FREQUENCY: 1
DYSURIA: 0
NERVOUS/ANXIOUS: 1
WEAKNESS: 0
PALPITATIONS: 0
SORE THROAT: 0
ARTHRALGIAS: 0
ABDOMINAL PAIN: 0
MYALGIAS: 0
SHORTNESS OF BREATH: 0
CHILLS: 0
CONSTIPATION: 0
DIZZINESS: 0
HEADACHES: 0
COUGH: 0
BREAST MASS: 0
HEMATURIA: 0
HEMATOCHEZIA: 0
PARESTHESIAS: 0
NAUSEA: 0
JOINT SWELLING: 0
EYE PAIN: 0
DIARRHEA: 0
FEVER: 0

## 2023-01-24 ASSESSMENT — PATIENT HEALTH QUESTIONNAIRE - PHQ9
SUM OF ALL RESPONSES TO PHQ QUESTIONS 1-9: 9
SUM OF ALL RESPONSES TO PHQ QUESTIONS 1-9: 9
10. IF YOU CHECKED OFF ANY PROBLEMS, HOW DIFFICULT HAVE THESE PROBLEMS MADE IT FOR YOU TO DO YOUR WORK, TAKE CARE OF THINGS AT HOME, OR GET ALONG WITH OTHER PEOPLE: SOMEWHAT DIFFICULT

## 2023-01-24 NOTE — PROGRESS NOTES
[facility]                                     Progress Note    Patient Name: Iker Edge  Date: 2023      Service Type: Individual      Session Start Time: 12:02 Session End Time: 12:55     Session Length: 54    Session #:2    Attendees: Client    Service Modality:  Video Visit:      Provider verified identity through the following two step process.  Patient provided:  Patient photo, Patient  and Patient is known previously to provider    Telemedicine Visit: The patient's condition can be safely assessed and treated via synchronous audio and visual telemedicine encounter.      Reason for Telemedicine Visit: Services only offered telehealth    Originating Site (Patient Location): Patient's home    Distant Site (Provider Location): Provider Remote Setting    Consent:  The patient/guardian has verbally consented to: the potential risks and benefits of telemedicine (video visit) versus in person care; bill my insurance or make self-payment for services provided; and responsibility for payment of non-covered services.     Patient would like the video invitation sent by:  My Chart    Mode of Communication:  Video Conference via [employer]    Distant Location (Provider):  Off-site    As the provider I attest to compliance with applicable laws and regulations related to telemedicine.    DATA  Interactive Complexity: No  Crisis: No      Progress Since Last Session (Related to Symptoms / Goals / Homework):   Symptoms: No change :still has passive SI, stil has developed a safety plan    Homework: Partially completed      Episode of Care Goals: Satisfactory progress - ACTION (Actively working towards change); Intervened by reinforcing change plan / affirming steps taken     Current / Ongoing Stressors and Concerns: Patient reports she has decided to leave her job since she really did not feel happy and comfortable. She is now rethinking about her next step. Acknowledges some difficulties looking  for a new job. Will take some time to reflect, define happiness and decide what would be her next step. She is encouraged to focus on how she feels not how people perceive her. Agreed to start challenging some automatic negative thoughts that bring her confidence down. Will continue to work on this issue in future sessions.      Treatment Objective(s) Addressed in This Session:   Identify negative self-talk and behaviors: challenge core beliefs, myths, and actions     Intervention:     Situation        Automatic Thoughts  Cognitive Distortions      Feelings        Behavior        Questioning Thoughts          MI Intervention: Co-Developed Goal: targeting anxiety, depression,SI, Expressed Empathy/Understanding, Supported Autonomy, Collaboration, Evocation, Permission to raise concern or advise and Open-ended questions     Change Talk Expressed by the Patient: Taking steps    Provider Response to Change Talk: E - Evoked more info from patient about behavior change, A - Affirmed patient's thoughts, decisions, or attempts at behavior change, R - Reflected patient's change talk and S - Summarized patient's change talk statements    Assessments completed prior to visit: 12/07/2022    The following assessments were completed by patient for this visit:  PHQ2:   PHQ-2 ( 1999 [employer]) 1/24/2023 8/30/2019 4/25/2018 6/16/2016 6/9/2014   Q1: Little interest or pleasure in doing things 1 3 0 0 0   Q2: Feeling down, depressed or hopeless 2 2 0 0 0   PHQ-2 Score 3 5 0 0 0   PHQ-2 Total Score (12-17 Years)- Positive if 3 or more points; Administer PHQ-A if positive - 5 - - -   Q1: Little interest or pleasure in doing things Several days - - - -   Q2: Feeling down, depressed or hopeless More than half the days - - - -   PHQ-2 Score 3 - - - -     PHQ9:   PHQ-9 SCORE 3/24/2021 4/26/2021 11/8/2021 5/16/2022 11/28/2022 1/2/2023 1/24/2023   PHQ-9 Total Score MyChart - - - - - 12 (Moderate depression) 9 (Mild depression)   PHQ-9 Total Score  9 10 6 4 14 12 9     GAD2:   LISA-2 11/29/2022 1/2/2023   Feeling nervous, anxious, or on edge 1 1   Not being able to stop or control worrying 1 0   LISA-2 Total Score 2 1     GAD7:   LISA-7 SCORE 12/16/2019 2/24/2020 6/18/2020 4/26/2021 11/8/2021 5/16/2022 11/28/2022   Total Score 10 9 6 11 7 5 7     CAGE-AID:   CAGE-AID Total Score 11/29/2022   Total Score 2   Total Score MyChart 2 (A total score of 2 or greater is considered clinically significant)     Desha Suicide Severity Rating Scale (Lifetime/Recent)  Desha Suicide Severity Rating (Lifetime/Recent) 10/31/2019   Wish to be Dead (Lifetime) Yes   Non-Specific Active Suicidal Thoughts (Lifetime) Yes   RETIRED: 1. Wish to be Dead (Recent) Yes   RETIRED: 2. Non-Specific Active Suicidal Thoughts (Recent) Yes   3. Active Suicidal Ideation with any Methods (Not Plan) Without Intent to Act (Lifetime) No   RETIRED: 3. Active Suicidal Ideation with any Methods (Not Plan) Without Intent to Act (Recent) No   RETIRE: 4. Active Suicidal Ideation with Some Intent to Act, Without Specific Plan (Lifetime) No   4. Active Suicidal Ideation with Some Intent to Act, Without Specific Plan (Recent) No   RETIRE: 5. Active Suicidal Ideation with Specific Plan and Intent (Lifetime) No   RETIRED: 5. Active Suicidal Ideation with Specific Plan and Intent (Recent) No   Actual Attempt (Lifetime) No   Actual Attempt (Past 3 Months) No   Has subject engaged in non-suicidal self-injurious behavior? (Lifetime) No   Has subject engaged in non-suicidal self-injurious behavior? (Past 3 Months) No   Interrupted Attempts (Lifetime) No   Interrupted Attempts (Past 3 Months) No   Aborted or Self-Interrupted Attempt (Lifetime) No   Aborted or Self-Interrupted Attempt (Past 3 Months) No   Preparatory Acts or Behavior (Lifetime) No   Preparatory Acts or Behavior (Past 3 Months) No       ASSESSMENT: Current Emotional / Mental Status (status of significant symptoms):   Risk status (Self / Other harm  or suicidal ideation)   Patient denies current fears or concerns for personal safety.   Patient denies current or recent suicidal ideation or behaviors.   Patient denies current or recent homicidal ideation or behaviors.   Patient denies current or recent self injurious behavior or ideation.   Patient denies other safety concerns.   Patient reports there has been no change in risk factors since their last session.     Patient reports there has been no change in protective factors since their last session.     Recommended that patient call 911 or go to the local ED should there be a change in any of these risk factors.     Appearance:   Appropriate    Eye Contact:   Good    Psychomotor Behavior: Normal    Attitude:   Cooperative    Orientation:   Person Place Time Situation   Speech    Rate / Production: Normal     Volume:  Normal    Mood:    Normal   Affect:    Appropriate    Thought Content:  Clear    Thought Form:  Coherent  Logical    Insight:    Good      Medication Review:   No changes to current psychiatric medication(s)     Medication Compliance:   Yes     Changes in Health Issues:   None reported     Chemical Use Review:   Substance Use: Chemical use reviewed, no active concerns identified      Tobacco Use: No current tobacco use.      Diagnosis:  1. MDD (major depressive disorder), recurrent episode, moderate (H)      Collateral Reports Completed:   Routed note to PCP    PLAN: (Patient Tasks / Therapist Tasks / Other)  Patient will use her safety plan as needed.   Patient will review the CBT skills sent via [employer]  Patient will reflect on today's visit and made a sounding decision including defining Happiness in her own terms.   Patient next visit is on 4/4- will be called if any opening sooner    ANGELO Schaefer                                                ________________________________________________________________    Individual Treatment Plan    Patient's Name: Iker BARLOW Minesh  Date Of  Birth: 2000    Date of Creation: 1/03/2023    Date Treatment Plan Last Reviewed/Revised: 1/03/2023    DSM5 Diagnoses: 296.32 (F33.1) Major Depressive Disorder, Recurrent Episode, Moderate _ and With anxious distress or 300.00 (F41.9) Unspecified Anxiety Disorder     Psychosocial / Contextual Factors: Long standing history of depression since age 12-13. Can not pin point what was happening around that age.ongoing SI.     PROMIS (reviewed every 90 days): 26    Referral / Collaboration:  Referral to another professional/service is not indicated at this time..    Anticipated number of session for this episode of care: 9-12 sessions  Anticipation frequency of session: Biweekly  Anticipated Duration of each session: 53 or more minutes  Treatment plan will be reviewed in 90 days or when goals have been changed.     MeasurableTreatment Goal(s) related to diagnosis / functional impairment(s)  Goal 1: Patient will keep her depression under control in order to be productive      I will know I've met my goal when the level of depression of 4/4 is reduced  to 3/4 or better by the next review.       Objective #A (Patient Action)                           Status: Continued - Date(s):10/06/2022   Patient will Improve concentration, focus, and mindfulness in daily activities   Intervention(s)  Therapist will assign homework : practice CBT skills to challenge any distortions and incease confidence.   teach Healthy life styles: sleep, balance diet, exercise and personal hygiene.      Objective #B  Patient will use at least 3 coping skills for depression management in the next 12 weeks   Status: New - Date(s):1/03/2023  Intervention(s)  Therapist will assign homework : make a list of triggers and signs and discuss in the session for input.     Goal 2: Patient will develop healthy cognitive patterns and beliefs about self and the world that lead to alleviation and help prevent the relapse of depression symptoms.     I will know  I've met my goal when my level of my depression is reduced from 4/4 to 3/4 or better by the next review.       Objective #A (Patient Action)                          Patient will identify at least 4 stressors which contribute to feelings of depression.  Status: New - Date: 1/03/2023     Intervention(s)  Therapist will teach distraction skills. including seff soothing with the 5 senses.     Objective #B  Patient will Identify negative self-talk and behaviors: challenge core beliefs, myths, and actions.  Status: New - Date: 1/03/2023  Intervention(s)  Therapist will Introduce CBT skills.     Objective #C  Patient will Increase interest, engagement, and pleasure in doing things.  Status: New - Date: 1/03/2023  Intervention(s)  Therapist will Encourage patient to share identify and share thoughts and feelings to increase self confidence .     Goal 3: Client will will stabilize anxiety level while increasing ability to function on a daily basis.     I will know I've met my goal when my anxiety is reduced from 4/4 to 3/4 or better by the next review       Objective #A (Client Action)                Client will Increase interest, engagement, and pleasure in doing things.  Status: New - Date:1/03/2023     Intervention(s)  Therapist will provide educational materials on distraction activities.     Objective #B  Client will identify at least 4 fears / thoughts that contribute to feeling anxious.  Status: New - Date: 1/03/2023     Intervention(s)  Therapist will teach how to challenge negative thoughts using CBT skills including the 3 Cs.     Objective #C  Client will use thought-stopping strategy daily to reduce intrusive thoughts.  Status: New - Date:10/19/2022     Intervention(s)              Therapist will Teach how to use CBT: 3 Cs to challenge the NTs as they   present.   Objective #D (Patient Action)                          Status: New - Date: 1/03/2023     Intervention(s)  Therapist will teach emotional  "recognition/identification. related to behavior change.     Objective #E  Patient will use thought-stopping strategy daily to reduce intrusive thoughts.  Status: New - Date:1/03/2023  Intervention(s)  Therapist will assist the patient Identify any distortions and emotions that affect patient's productivity.     Patient has reviewed and agreed to the above plan.      Francisco Smith, LICSW  January 4, 2023        [facility]                                       Iker BARLOW Minesh     SAFETY PLAN:  Step 1: Warning signs / cues (Thoughts, images, mood, situation, behavior) that a crisis may be developing:    Thoughts: \"I'm a burden\", \"I can't do this anymore\" and \"Nothing makes it better\"    Images: obsessive thoughts of death or dying: \" Suicide\", flashbacks and visions of harm: Cutting, burning self.    Thinking Processes: intrusive thoughts (bothersome, unwanted thoughts that come out of nowhere): things done in the past; memories of poor living situation for a year about 5-6 years ago. , highly critical and negative thoughts: \" I don't deserve it, I am not doing enough, not good enough\" and depersonalization    Mood: worsening depression, hopelessness, intense anger, agitation and mood swings    Behaviors: isolating/withdrawing , using alcohol, can't stop crying, aggression, not taking care of my responsibilities and increasing frequency and duration of dissociation    Situations: bullying: from co workers( current); schoolmates( past), public shame: how she perceives self and relationship problems   Step 2: Coping strategies - Things I can do to take my mind off of my problems without contacting another person (relaxation technique, physical activity):    Distress Tolerance Strategies:  read a book: Any and Writing; video games    Physical Activities: go for a walk, gardening and stretching     Focus on helpful thoughts:  \"This is temporary\", \"I will get through this\" and \"It always passes\"  Step " 3: People and social settings that provide distraction:   Name: Jeremiah ( Partner) Phone:  [phone]   Name:  Emerita ( good friend) Phone:    [phone]    coffee shop and park   Step 4: Remind myself of people and things that are important to me and worth living for:  Partner  Step 5: When I am in crisis, I can ask these people to help me use my safety plan:  Name: Jeremiah ( Partner) Phone:  [phone]  Name:  Emerita ( good friend) Phone:    [phone]  Step 6: Making the environment safe:     remove alcohol and be around others  Step 7: Professionals or agencies I can contact during a crisis:    Suicide Prevention Lifeline: Call or Text 566   Local Crisis Services:  [facility] crisis line [phone].    Call 911 or go to my nearest emergency department.   I helped develop this safety plan and agree to use it when needed.  I have been given a copy of this plan.   Client signature _________________________________________________________________  Today s date:  1/3/2023  Completed by Provider Name/ Credentials: ANGELO Schaefer January 3, 2023  Adapted from Safety Plan Template 2008 Jazlyn Carreon and Bryan Weebr is reprinted with the express permission of the authors.  No portion of the Safety Plan Template may be reproduced without the express, written permission.  You can contact the authors at [email or web address] or [email or web address].  Answers for HPI/ROS submitted by the patient on 1/2/2023  If you checked off any problems, how difficult have these problems made it for you to do your work, take care of things at home, or get along with other people?: Somewhat difficult  PHQ9 TOTAL SCORE: 12

## 2023-01-25 ENCOUNTER — OFFICE VISIT (OUTPATIENT)
Dept: FAMILY MEDICINE | Facility: CLINIC | Age: 23
End: 2023-01-25
Payer: COMMERCIAL

## 2023-01-25 VITALS
SYSTOLIC BLOOD PRESSURE: 126 MMHG | BODY MASS INDEX: 20.01 KG/M2 | HEART RATE: 73 BPM | HEIGHT: 70 IN | DIASTOLIC BLOOD PRESSURE: 60 MMHG | OXYGEN SATURATION: 99 % | RESPIRATION RATE: 16 BRPM | TEMPERATURE: 98.3 F | WEIGHT: 139.8 LBS

## 2023-01-25 DIAGNOSIS — Z11.59 NEED FOR HEPATITIS C SCREENING TEST: ICD-10-CM

## 2023-01-25 DIAGNOSIS — Z11.4 SCREENING FOR HIV (HUMAN IMMUNODEFICIENCY VIRUS): ICD-10-CM

## 2023-01-25 DIAGNOSIS — F33.1 MODERATE RECURRENT MAJOR DEPRESSION (H): ICD-10-CM

## 2023-01-25 DIAGNOSIS — Z00.00 ROUTINE GENERAL MEDICAL EXAMINATION AT A HEALTH CARE FACILITY: Primary | ICD-10-CM

## 2023-01-25 LAB
HCV AB SERPL QL IA: NONREACTIVE
HIV 1+2 AB+HIV1 P24 AG SERPL QL IA: NONREACTIVE

## 2023-01-25 PROCEDURE — 87389 HIV-1 AG W/HIV-1&-2 AB AG IA: CPT | Performed by: FAMILY MEDICINE

## 2023-01-25 PROCEDURE — 99395 PREV VISIT EST AGE 18-39: CPT | Performed by: FAMILY MEDICINE

## 2023-01-25 PROCEDURE — 36415 COLL VENOUS BLD VENIPUNCTURE: CPT | Performed by: FAMILY MEDICINE

## 2023-01-25 PROCEDURE — 86803 HEPATITIS C AB TEST: CPT | Performed by: FAMILY MEDICINE

## 2023-01-25 ASSESSMENT — ENCOUNTER SYMPTOMS
CHILLS: 0
HEADACHES: 0
EYE PAIN: 0
FREQUENCY: 1
SHORTNESS OF BREATH: 0
COUGH: 0
WEAKNESS: 0
DIARRHEA: 0
ABDOMINAL PAIN: 0
FEVER: 0
ARTHRALGIAS: 0
JOINT SWELLING: 0
NERVOUS/ANXIOUS: 1
DIZZINESS: 0
DYSURIA: 0
NAUSEA: 0
PALPITATIONS: 0
CONSTIPATION: 0
HEMATURIA: 0
MYALGIAS: 0
SORE THROAT: 0

## 2023-01-25 ASSESSMENT — ANXIETY QUESTIONNAIRES
7. FEELING AFRAID AS IF SOMETHING AWFUL MIGHT HAPPEN: SEVERAL DAYS
GAD7 TOTAL SCORE: 5
IF YOU CHECKED OFF ANY PROBLEMS ON THIS QUESTIONNAIRE, HOW DIFFICULT HAVE THESE PROBLEMS MADE IT FOR YOU TO DO YOUR WORK, TAKE CARE OF THINGS AT HOME, OR GET ALONG WITH OTHER PEOPLE: NOT DIFFICULT AT ALL
3. WORRYING TOO MUCH ABOUT DIFFERENT THINGS: NOT AT ALL
4. TROUBLE RELAXING: SEVERAL DAYS
2. NOT BEING ABLE TO STOP OR CONTROL WORRYING: SEVERAL DAYS
GAD7 TOTAL SCORE: 5
8. IF YOU CHECKED OFF ANY PROBLEMS, HOW DIFFICULT HAVE THESE MADE IT FOR YOU TO DO YOUR WORK, TAKE CARE OF THINGS AT HOME, OR GET ALONG WITH OTHER PEOPLE?: NOT DIFFICULT AT ALL
7. FEELING AFRAID AS IF SOMETHING AWFUL MIGHT HAPPEN: SEVERAL DAYS
5. BEING SO RESTLESS THAT IT IS HARD TO SIT STILL: NOT AT ALL
1. FEELING NERVOUS, ANXIOUS, OR ON EDGE: SEVERAL DAYS
6. BECOMING EASILY ANNOYED OR IRRITABLE: SEVERAL DAYS

## 2023-01-25 ASSESSMENT — PAIN SCALES - GENERAL: PAINLEVEL: NO PAIN (0)

## 2023-01-25 NOTE — PATIENT INSTRUCTIONS
Preventive Health Recommendations  Ages 21 - 25     Yearly exam:             See your health care provider every year in order to  o   Review health changes.   o   Discuss preventive care.    o   Review your medicines if your doctor has prescribed any.  You should be tested each year for STDs (sexually transmitted diseases).   Talk to your provider about cholesterol testing.    If you are at risk for diabetes, you should have a diabetes test (fasting glucose).    Shots: Get a flu shot each year. Get a tetanus shot every 10 years.     Nutrition:  Eat at least 5 servings of fruits and vegetables daily.   Eat whole-grain bread, whole-wheat pasta and brown rice instead of white grains and rice.   Get adequate calcium and Vitamin D.     Lifestyle  Exercise for at least 150 minutes a week (30 minutes a day, 5 days a week). This will help you control your weight and prevent disease.   Limit alcohol to one drink per day.   No smoking.   Wear sunscreen to prevent skin cancer.   See your dentist every six months for an exam and cleaning.

## 2023-01-25 NOTE — PROGRESS NOTES
SUBJECTIVE:   CC: Brit is an 23 year old who presents for preventative health visit.   Patient has been advised of split billing requirements and indicates understanding: Yes  Healthy Habits:     Getting at least 3 servings of Calcium per day:  Yes    Bi-annual eye exam:  NO    Dental care twice a year:  NO    Sleep apnea or symptoms of sleep apnea:  None    Diet:  Regular (no restrictions)    Frequency of exercise:  None    Taking medications regularly:  Yes    Medication side effects:  None    PHQ-2 Total Score: 3    Additional concerns today:  No    Patient is seeing a behavior therapist for depression and anxiety.  On sertraline as well.  Feels like has worsened recently due to resigning from a long time employment recently.  Patient defers change in dose of med yet.        Today's PHQ-2 Score:   PHQ-2 ( 1999 Pfizer) 1/24/2023   Q1: Little interest or pleasure in doing things 1   Q2: Feeling down, depressed or hopeless 2   PHQ-2 Score 3   PHQ-2 Total Score (12-17 Years)- Positive if 3 or more points; Administer PHQ-A if positive -   Q1: Little interest or pleasure in doing things Several days   Q2: Feeling down, depressed or hopeless More than half the days   PHQ-2 Score 3       Have you ever done Advance Care Planning? (For example, a Health Directive, POLST, or a discussion with a medical provider or your loved ones about your wishes): No, advance care planning information given to patient to review.  Patient plans to discuss their wishes with loved ones or provider.      Social History     Tobacco Use     Smoking status: Never     Smokeless tobacco: Never     Tobacco comments:     no tobacco exposure   Substance Use Topics     Alcohol use: Yes     Comment: 1-2 weekly     If you drink alcohol do you typically have >3 drinks per day or >7 drinks per week? No    No flowsheet data found.    Last PSA: No results found for: PSA    Reviewed orders with patient. Reviewed health maintenance and updated orders  accordingly - Yes  Patient Active Problem List   Diagnosis     Epidermal inclusion cyst     Allergic conjunctivitis, bilateral     Seasonal allergic rhinitis     Klippel Trenaunay syndrome     Venous (peripheral) insufficiency     Varicose veins of left leg with edema     Anxiety and depression     Moderate recurrent major depression (H)     Gender dysphoria in adult     Past Surgical History:   Procedure Laterality Date     SURGICAL HISTORY OF -       bilateral myringotomy tubes       Social History     Tobacco Use     Smoking status: Never     Smokeless tobacco: Never     Tobacco comments:     no tobacco exposure   Substance Use Topics     Alcohol use: Yes     Comment: 1-2 weekly     Family History   Problem Relation Age of Onset     Thyroid Disease Mother         has 1/2 thyroid     Gastrointestinal Disease Mother         ulcerative colitis     Depression Mother      Anxiety Disorder Mother      Eye Disorder Paternal Grandmother         cataracts     Anemia Paternal Grandmother      Hypertension Father      Depression Father      Substance Abuse Father      Hernia Father      Heart Disease Father          Current Outpatient Medications   Medication Sig Dispense Refill     estradiol (ESTRACE) 2 MG tablet Take 1 tablet (2 mg) by mouth 2 times daily 180 tablet 3     order for DME Equipment being ordered: Compression stockings - upper thigh-high - 20-30 (maximum compression) - one pair 1 each 1     sertraline (ZOLOFT) 50 MG tablet Take 1 tablet (50 mg) by mouth daily 90 tablet 3     spironolactone (ALDACTONE) 100 MG tablet Take 1.5 tablets (150 mg) by mouth 2 times daily 270 tablet 3     fluticasone (FLONASE) 50 MCG/ACT nasal spray Spray 1 spray into both nostrils daily (Patient not taking: Reported on 11/28/2022) 16 g 3     olopatadine (PATADAY) 0.2 % ophthalmic solution Place 1 drop into both eyes daily For itchy eyes due to allrgies (Patient not taking: Reported on 11/28/2022) 2.5 mL 1     No Known  "Allergies    Reviewed and updated as needed this visit by clinical staff   Tobacco  Allergies  Meds              Reviewed and updated as needed this visit by Provider     Meds             Past Medical History:   Diagnosis Date     Depressive disorder      Unspecified asthma(493.90)     reactive airway      Past Surgical History:   Procedure Laterality Date     SURGICAL HISTORY OF -       bilateral myringotomy tubes       Review of Systems   Constitutional: Negative for chills and fever.   HENT: Positive for congestion. Negative for ear pain, hearing loss and sore throat.    Eyes: Negative for pain and visual disturbance.   Respiratory: Negative for cough and shortness of breath.    Cardiovascular: Negative for chest pain and palpitations.   Gastrointestinal: Negative for abdominal pain, constipation, diarrhea and nausea.   Genitourinary: Positive for frequency. Negative for dysuria, genital sores, hematuria and urgency.   Musculoskeletal: Negative for arthralgias, joint swelling and myalgias.   Skin: Negative for rash.   Neurological: Negative for dizziness, weakness and headaches.   Psychiatric/Behavioral: The patient is nervous/anxious.      Urinary frequency since starting spironolactone.     OBJECTIVE:   /60 (BP Location: Left arm, Patient Position: Chair, Cuff Size: Adult Regular)   Pulse 73   Temp 98.3  F (36.8  C) (Tympanic)   Resp 16   Ht 1.772 m (5' 9.75\")   Wt 63.4 kg (139 lb 12.8 oz)   SpO2 99%   BMI 20.20 kg/m      Physical Exam  GENERAL APPEARANCE: underweight, ambulatory w/o assist,  alert and no distress  EYES: pink conj, no icterus, PERRL, EOMI  HENT: ear canals and TM's normal, nose and mouth without ulcers or lesions, oropharynx clear and oral mucous membranes moist  NECK: no adenopathy, no asymmetry, masses, or scars and thyroid normal to palpation  RESP: lungs clear to auscultation - no rales, rhonchi or wheezes  CV: regular rates and rhythm, normal S1 S2, no S3 or S4, no murmur, " click or rub, no peripheral edema and peripheral pulses strong  ABDOMEN: soft, nontender, no hepatosplenomegaly, no masses and bowel sounds normal  RECTAL: deferred  MS: no musculoskeletal defects are noted and gait is age appropriate without ataxia  SKIN: no suspicious lesions or rashes; multiple tattoos present  NEURO: Normal strength and tone, sensory exam grossly normal, mentation intact and speech normal    Diagnostic Test Results:  none     ASSESSMENT/PLAN:   VINCE was seen today for physical.    Diagnoses and all orders for this visit:    Routine general medical examination at a health care facility  Patient was advised on recommended screening and preventive health recommendations.  He verbalized understanding and agreed to the plans below.    Moderate recurrent major depression (H)  Patient deferred changing sertraline dose.  Continue CBT.  Patient denies suicidality.  Return precautions discussed and given to patient.     Screening for HIV (human immunodeficiency virus)  -     HIV Antigen Antibody Combo    Need for hepatitis C screening test  -     Hepatitis C Screen Reflex to HCV RNA Quant and Genotype        Patient has been advised of split billing requirements and indicates understanding: Yes      COUNSELING:   Reviewed preventive health counseling, as reflected in patient instructions        She reports that she has never smoked. She has never used smokeless tobacco.        Paul Pacheco MD  [facility]  Answers for HPI/ROS submitted by the patient on 1/24/2023  If you checked off any problems, how difficult have these problems made it for you to do your work, take care of things at home, or get along with other people?: Somewhat difficult  PHQ9 TOTAL SCORE: 9  LISA 7 TOTAL SCORE: 5  Blood in stool: No  heartburn: No  peripheral edema: No  mood changes: Yes  Skin sensation changes: No  pelvic pain: No  vaginal bleeding: No  vaginal discharge: No  tenderness: No  breast mass:  No  breast discharge: No  impotence: No  penile discharge: No

## 2023-01-30 ENCOUNTER — VIRTUAL VISIT (OUTPATIENT)
Dept: PSYCHOLOGY | Facility: CLINIC | Age: 23
End: 2023-01-30
Payer: COMMERCIAL

## 2023-01-30 DIAGNOSIS — F33.1 MDD (MAJOR DEPRESSIVE DISORDER), RECURRENT EPISODE, MODERATE (H): Primary | ICD-10-CM

## 2023-01-30 PROCEDURE — 90837 PSYTX W PT 60 MINUTES: CPT | Mod: 95 | Performed by: SOCIAL WORKER

## 2023-01-30 NOTE — PROGRESS NOTES
[facility]                                     Progress Note    Patient Name: Iker Edge  Date:2023      Service Type: Individual      Session Start Time: 8:03 Session End Time:8:56     Session Length:53    Session #:3    Attendees: Client    Service Modality:  Video Visit:      Provider verified identity through the following two step process.  Patient provided:  Patient photo, Patient  and Patient is known previously to provider    Telemedicine Visit: The patient's condition can be safely assessed and treated via synchronous audio and visual telemedicine encounter.      Reason for Telemedicine Visit: Services only offered telehealth    Originating Site (Patient Location): Patient's home    Distant Site (Provider Location): Provider Remote Setting    Consent:  The patient/guardian has verbally consented to: the potential risks and benefits of telemedicine (video visit) versus in person care; bill my insurance or make self-payment for services provided; and responsibility for payment of non-covered services.     Patient would like the video invitation sent by:  My Chart    Mode of Communication:  Video Conference via [employer]    Distant Location (Provider):  Off-site    As the provider I attest to compliance with applicable laws and regulations related to telemedicine.    DATA  Interactive Complexity: No  Crisis: No      Progress Since Last Session (Related to Symptoms / Goals / Homework):   Symptoms: No change :still has passive SI, stil has developed a safety plan    Homework: Partially completed      Episode of Care Goals: Satisfactory progress - ACTION (Actively working towards change); Intervened by reinforcing change plan / affirming steps taken    Current / Ongoing Stressors and Concerns:  Patient has been working to sort out ideas and thoughts about her focus. It is too overwhelming to her but she agreed it is important to start with small goals and understanding on self,  ability, confidence, and positivity about what to come. ID any distortions as they present will help her challenge them and increase confidence. Patient understands she is the master of her life and will get some support to help keep up with her daily functions. Today, she learned about breaking down big goals using SMART format. Will be focusing on the small success VS deficits. Patient shared satisfaction around having tools/thoughts to start thinking about. She will continue defining Happiness. She started recalling good hobbies and routines she has put aside for some unexplained reasons: daily walks, making bread. Will also try to do journaling. Can try to use GLAD format. Her next visit is on 3/16.     Treatment Objective(s) Addressed in This Session:   Identify negative self-talk and behaviors: challenge core beliefs, myths, and actions     Intervention:     Situation        Automatic Thoughts  Cognitive Distortions      Feelings        Behavior        Questioning Thoughts          MI Intervention: Expressed Empathy/Understanding, Supported Autonomy, Collaboration, Evocation, Permission to raise concern or advise and Open-ended questions     Change Talk Expressed by the Patient: Taking steps    Provider Response to Change Talk: E - Evoked more info from patient about behavior change, A - Affirmed patient's thoughts, decisions, or attempts at behavior change, R - Reflected patient's change talk and S - Summarized patient's change talk statements    Assessments completed prior to visit: 12/07/2022    The following assessments were completed by patient for this visit:  PHQ2:   PHQ-2 ( 1999 Pfizer) 1/24/2023 8/30/2019 4/25/2018 6/16/2016 6/9/2014   Q1: Little interest or pleasure in doing things 1 3 0 0 0   Q2: Feeling down, depressed or hopeless 2 2 0 0 0   PHQ-2 Score 3 5 0 0 0   PHQ-2 Total Score (12-17 Years)- Positive if 3 or more points; Administer PHQ-A if positive - 5 - - -   Q1: Little interest or  pleasure in doing things Several days - - - -   Q2: Feeling down, depressed or hopeless More than half the days - - - -   PHQ-2 Score 3 - - - -     PHQ9:   PHQ-9 SCORE 3/24/2021 4/26/2021 11/8/2021 5/16/2022 11/28/2022 1/2/2023 1/24/2023   PHQ-9 Total Score MyChart - - - - - 12 (Moderate depression) 9 (Mild depression)   PHQ-9 Total Score 9 10 6 4 14 12 9     GAD2:   LISA-2 11/29/2022 1/2/2023   Feeling nervous, anxious, or on edge 1 1   Not being able to stop or control worrying 1 0   LISA-2 Total Score 2 1     GAD7:   LISA-7 SCORE 2/24/2020 6/18/2020 4/26/2021 11/8/2021 5/16/2022 11/28/2022 1/25/2023   Total Score - - - - - - 5 (mild anxiety)   Total Score 9 6 11 7 5 7 5     CAGE-AID:   CAGE-AID Total Score 11/29/2022   Total Score 2   Total Score MyChart 2 (A total score of 2 or greater is considered clinically significant)     [place] Suicide Severity Rating Scale (Lifetime/Recent)  [place] Suicide Severity Rating (Lifetime/Recent) 10/31/2019   Wish to be Dead (Lifetime) Yes   Non-Specific Active Suicidal Thoughts (Lifetime) Yes   RETIRED: 1. Wish to be Dead (Recent) Yes   RETIRED: 2. Non-Specific Active Suicidal Thoughts (Recent) Yes   3. Active Suicidal Ideation with any Methods (Not Plan) Without Intent to Act (Lifetime) No   RETIRED: 3. Active Suicidal Ideation with any Methods (Not Plan) Without Intent to Act (Recent) No   RETIRE: 4. Active Suicidal Ideation with Some Intent to Act, Without Specific Plan (Lifetime) No   4. Active Suicidal Ideation with Some Intent to Act, Without Specific Plan (Recent) No   RETIRE: 5. Active Suicidal Ideation with Specific Plan and Intent (Lifetime) No   RETIRED: 5. Active Suicidal Ideation with Specific Plan and Intent (Recent) No   Actual Attempt (Lifetime) No   Actual Attempt (Past 3 Months) No   Has subject engaged in non-suicidal self-injurious behavior? (Lifetime) No   Has subject engaged in non-suicidal self-injurious behavior? (Past 3 Months) No   Interrupted  Attempts (Lifetime) No   Interrupted Attempts (Past 3 Months) No   Aborted or Self-Interrupted Attempt (Lifetime) No   Aborted or Self-Interrupted Attempt (Past 3 Months) No   Preparatory Acts or Behavior (Lifetime) No   Preparatory Acts or Behavior (Past 3 Months) No       ASSESSMENT: Current Emotional / Mental Status (status of significant symptoms):   Risk status (Self / Other harm or suicidal ideation)   Patient denies current fears or concerns for personal safety.   Patient denies current or recent suicidal ideation or behaviors.   Patient denies current or recent homicidal ideation or behaviors.   Patient denies current or recent self injurious behavior or ideation.   Patient denies other safety concerns.   Patient reports there has been no change in risk factors since their last session.     Patient reports there has been no change in protective factors since their last session.     Recommended that patient call 911 or go to the local ED should there be a change in any of these risk factors.     Appearance:   Appropriate    Eye Contact:   Good    Psychomotor Behavior: Normal    Attitude:   Cooperative    Orientation:   Person Place Time Situation   Speech    Rate / Production: Normal     Volume:  Normal    Mood:    Normal   Affect:    Appropriate    Thought Content:  Clear    Thought Form:  Coherent  Logical    Insight:    Good      Medication Review:   No changes to current psychiatric medication(s)     Medication Compliance:   Yes     Changes in Health Issues:   None reported     Chemical Use Review:   Substance Use: Chemical use reviewed, no active concerns identified      Tobacco Use: No current tobacco use.      Diagnosis:  1. MDD (major depressive disorder), recurrent episode, moderate (H)      Collateral Reports Completed:   Routed note to PCP    PLAN: (Patient Tasks / Therapist Tasks / Other)  Patient will use her safety plan as needed.   Patient will review the CBT skills sent via [employer]  Patient  will reflect on today's visit and made a sounding decision: will  continue to define Happiness in her own terms.   Patient next visit is on 3/16- will be called if any opening sooner    ANGELO Schaefer                                                ________________________________________________________________    Individual Treatment Plan    Patient's Name: Iker Edge  YOB: 2000    Date of Creation: 1/03/2023    Date Treatment Plan Last Reviewed/Revised: 1/03/2023    DSM5 Diagnoses: 296.32 (F33.1) Major Depressive Disorder, Recurrent Episode, Moderate _ and With anxious distress or 300.00 (F41.9) Unspecified Anxiety Disorder     Psychosocial / Contextual Factors: Long standing history of depression since age 12-13. Can not pin point what was happening around that age.ongoing SI.     PROMIS (reviewed every 90 days): 26    Referral / Collaboration:  Referral to another professional/service is not indicated at this time..    Anticipated number of session for this episode of care: 9-12 sessions  Anticipation frequency of session: Biweekly  Anticipated Duration of each session: 53 or more minutes  Treatment plan will be reviewed in 90 days or when goals have been changed.     MeasurableTreatment Goal(s) related to diagnosis / functional impairment(s)  Goal 1: Patient will keep her depression under control in order to be productive      I will know I've met my goal when the level of depression of 4/4 is reduced  to 3/4 or better by the next review.       Objective #A (Patient Action)                           Status: Continued - Date(s):10/06/2022   Patient will Improve concentration, focus, and mindfulness in daily activities   Intervention(s)  Therapist will assign homework : practice CBT skills to challenge any distortions and incease confidence.   teach Healthy life styles: sleep, balance diet, exercise and personal hygiene.      Objective #B  Patient will use at least 3 coping skills for  depression management in the next 12 weeks   Status: New - Date(s):1/03/2023  Intervention(s)  Therapist will assign homework : make a list of triggers and signs and discuss in the session for input.     Goal 2: Patient will develop healthy cognitive patterns and beliefs about self and the world that lead to alleviation and help prevent the relapse of depression symptoms.     I will know I've met my goal when my level of my depression is reduced from 4/4 to 3/4 or better by the next review.       Objective #A (Patient Action)                          Patient will identify at least 4 stressors which contribute to feelings of depression.  Status: New - Date: 1/03/2023     Intervention(s)  Therapist will teach distraction skills. including seff soothing with the 5 senses.     Objective #B  Patient will Identify negative self-talk and behaviors: challenge core beliefs, myths, and actions.  Status: New - Date: 1/03/2023  Intervention(s)  Therapist will Introduce CBT skills.     Objective #C  Patient will Increase interest, engagement, and pleasure in doing things.  Status: New - Date: 1/03/2023  Intervention(s)  Therapist will Encourage patient to share identify and share thoughts and feelings to increase self confidence .     Goal 3: Client will will stabilize anxiety level while increasing ability to function on a daily basis.     I will know I've met my goal when my anxiety is reduced from 4/4 to 3/4 or better by the next review       Objective #A (Client Action)                Client will Increase interest, engagement, and pleasure in doing things.  Status: New - Date:1/03/2023     Intervention(s)  Therapist will provide educational materials on distraction activities.     Objective #B  Client will identify at least 4 fears / thoughts that contribute to feeling anxious.  Status: New - Date: 1/03/2023     Intervention(s)  Therapist will teach how to challenge negative thoughts using CBT skills including the 3  "Cs.     Objective #C  Client will use thought-stopping strategy daily to reduce intrusive thoughts.  Status: New - Date:10/19/2022     Intervention(s)              Therapist will Teach how to use CBT: 3 Cs to challenge the NTs as they   present.   Objective #D (Patient Action)                          Status: New - Date: 1/03/2023     Intervention(s)  Therapist will teach emotional recognition/identification. related to behavior change.     Objective #E  Patient will use thought-stopping strategy daily to reduce intrusive thoughts.  Status: New - Date:1/03/2023  Intervention(s)  Therapist will assist the patient Identify any distortions and emotions that affect patient's productivity.     Patient has reviewed and agreed to the above plan.      Francisco Smith [facility]  January 4, 2023        [facility]                                       Iker Edge     SAFETY PLAN:  Step 1: Warning signs / cues (Thoughts, images, mood, situation, behavior) that a crisis may be developing:    Thoughts: \"I'm a burden\", \"I can't do this anymore\" and \"Nothing makes it better\"    Images: obsessive thoughts of death or dying: \" Suicide\", flashbacks and visions of harm: Cutting, burning self.    Thinking Processes: intrusive thoughts (bothersome, unwanted thoughts that come out of nowhere): things done in the past; memories of poor living situation for a year about 5-6 years ago. , highly critical and negative thoughts: \" I don't deserve it, I am not doing enough, not good enough\" and depersonalization    Mood: worsening depression, hopelessness, intense anger, agitation and mood swings    Behaviors: isolating/withdrawing , using alcohol, can't stop crying, aggression, not taking care of my responsibilities and increasing frequency and duration of dissociation    Situations: bullying: from co workers( current); schoolmates( past), public shame: how she perceives self and relationship problems   Step 2: Coping " "strategies - Things I can do to take my mind off of my problems without contacting another person (relaxation technique, physical activity):    Distress Tolerance Strategies:  read a book: Any and Writing; video games    Physical Activities: go for a walk, gardening and stretching     Focus on helpful thoughts:  \"This is temporary\", \"I will get through this\" and \"It always passes\"  Step 3: People and social settings that provide distraction:   Name: Jeremiah ( Partner) Phone:  [phone]   Name:  Emerita ( good friend) Phone:    [phone]    coffee shop and park   Step 4: Remind myself of people and things that are important to me and worth living for:  Partner  Step 5: When I am in crisis, I can ask these people to help me use my safety plan:  Name: Jeremiah ( Partner) Phone:  [phone]  Name:  Emerita ( good friend) Phone:    [phone]  Step 6: Making the environment safe:     remove alcohol and be around others  Step 7: Professionals or agencies I can contact during a crisis:    Suicide Prevention Lifeline: Call or Text 266   Local Crisis Services:  [facility] crisis line 1-982.999.4316.    Call 911 or go to my nearest emergency department.   I helped develop this safety plan and agree to use it when needed.  I have been given a copy of this plan.   Client signature _________________________________________________________________  Today s date:  1/3/2023  Completed by Provider Name/ Credentials: ANGELO Schaefer January 3, 2023  Adapted from Safety Plan Template 2008 Jazlyn Carreon and Bryan Weber is reprinted with the express permission of the authors.  No portion of the Safety Plan Template may be reproduced without the express, written permission.  You can contact the authors at [email or web address] or juan [email or web address].  Answers for HPI/ROS submitted by the patient on 1/2/2023  If you checked off any problems, how difficult have these problems made it for you to do your work, take " care of things at home, or get along with other people?: Somewhat difficult  PHQ9 TOTAL SCORE: 12

## 2023-03-01 ENCOUNTER — VIRTUAL VISIT (OUTPATIENT)
Dept: PSYCHOLOGY | Facility: CLINIC | Age: 23
End: 2023-03-01
Payer: COMMERCIAL

## 2023-03-01 DIAGNOSIS — F33.1 MDD (MAJOR DEPRESSIVE DISORDER), RECURRENT EPISODE, MODERATE (H): Primary | ICD-10-CM

## 2023-03-01 PROCEDURE — 90837 PSYTX W PT 60 MINUTES: CPT | Mod: VID | Performed by: SOCIAL WORKER

## 2023-03-01 ASSESSMENT — PATIENT HEALTH QUESTIONNAIRE - PHQ9
10. IF YOU CHECKED OFF ANY PROBLEMS, HOW DIFFICULT HAVE THESE PROBLEMS MADE IT FOR YOU TO DO YOUR WORK, TAKE CARE OF THINGS AT HOME, OR GET ALONG WITH OTHER PEOPLE: NOT DIFFICULT AT ALL
SUM OF ALL RESPONSES TO PHQ QUESTIONS 1-9: 12
SUM OF ALL RESPONSES TO PHQ QUESTIONS 1-9: 12

## 2023-03-21 NOTE — NURSING NOTE
"Initial /74 (BP Location: Left arm, Patient Position: Chair, Cuff Size: Adult Regular)  Pulse 70  Resp 16 Estimated body mass index is 21.08 kg/(m^2) as calculated from the following:    Height as of 6/14/18: 1.765 m (5' 9.5\").    Weight as of 6/14/18: 65.7 kg (144 lb 12.8 oz). .  .  josafat jerome LPN    " PAST MEDICAL HISTORY:  GERD (gastroesophageal reflux disease)      PAST MEDICAL HISTORY:  GERD (gastroesophageal reflux disease)     Inguinal hernia

## 2023-04-04 ENCOUNTER — VIRTUAL VISIT (OUTPATIENT)
Dept: PSYCHOLOGY | Facility: CLINIC | Age: 23
End: 2023-04-04
Payer: COMMERCIAL

## 2023-04-04 DIAGNOSIS — F41.1 GENERALIZED ANXIETY DISORDER: Primary | ICD-10-CM

## 2023-04-04 PROCEDURE — 90837 PSYTX W PT 60 MINUTES: CPT | Mod: VID | Performed by: SOCIAL WORKER

## 2023-04-04 ASSESSMENT — ANXIETY QUESTIONNAIRES
IF YOU CHECKED OFF ANY PROBLEMS ON THIS QUESTIONNAIRE, HOW DIFFICULT HAVE THESE PROBLEMS MADE IT FOR YOU TO DO YOUR WORK, TAKE CARE OF THINGS AT HOME, OR GET ALONG WITH OTHER PEOPLE: SOMEWHAT DIFFICULT
GAD7 TOTAL SCORE: 5
2. NOT BEING ABLE TO STOP OR CONTROL WORRYING: SEVERAL DAYS
5. BEING SO RESTLESS THAT IT IS HARD TO SIT STILL: NOT AT ALL
1. FEELING NERVOUS, ANXIOUS, OR ON EDGE: SEVERAL DAYS
7. FEELING AFRAID AS IF SOMETHING AWFUL MIGHT HAPPEN: NOT AT ALL
3. WORRYING TOO MUCH ABOUT DIFFERENT THINGS: SEVERAL DAYS
6. BECOMING EASILY ANNOYED OR IRRITABLE: SEVERAL DAYS
GAD7 TOTAL SCORE: 5

## 2023-04-04 ASSESSMENT — PATIENT HEALTH QUESTIONNAIRE - PHQ9
SUM OF ALL RESPONSES TO PHQ QUESTIONS 1-9: 6
10. IF YOU CHECKED OFF ANY PROBLEMS, HOW DIFFICULT HAVE THESE PROBLEMS MADE IT FOR YOU TO DO YOUR WORK, TAKE CARE OF THINGS AT HOME, OR GET ALONG WITH OTHER PEOPLE: NOT DIFFICULT AT ALL
SUM OF ALL RESPONSES TO PHQ QUESTIONS 1-9: 6
5. POOR APPETITE OR OVEREATING: SEVERAL DAYS

## 2023-04-04 NOTE — PROGRESS NOTES
[facility]                                      Progress Note    Patient Name: Iker Edge  Date:2023      Service Type: Individual      Session Start Time: 14:02 Session End Time:14:55     Session Length: 53    Session #:4    Attendees: Client    Service Modality:  Video Visit:      Provider verified identity through the following two step process.  Patient provided:  Patient photo, Patient  and Patient is known previously to provider    Telemedicine Visit: The patient's condition can be safely assessed and treated via synchronous audio and visual telemedicine encounter.      Reason for Telemedicine Visit: Services only offered telehealth    Originating Site (Patient Location): Patient's home    Distant Site (Provider Location): Provider Remote Setting    Consent:  The patient/guardian has verbally consented to: the potential risks and benefits of telemedicine (video visit) versus in person care; bill my insurance or make self-payment for services provided; and responsibility for payment of non-covered services.     Patient would like the video invitation sent by:  My Chart    Mode of Communication:  Video Conference via [employer]    Distant Location (Provider):  Off-site    As the provider I attest to compliance with applicable laws and regulations related to telemedicine.    DATA  Interactive Complexity: Yes, visit entailed Interactive Complexity evidenced by: Ongoing struggle with life in general.  Needed more time to process her thoughts and keep her plan to stay safe.Safety plan in place.      Crisis: No      Progress Since Last Session (Related to Symptoms / Goals / Homework):   Symptoms: Some improvement. no SI this week    Homework: Partially completed      Episode of Care Goals: Satisfactory progress - ACTION (Actively working towards change); Intervened by reinforcing change plan / affirming steps taken    Current / Ongoing Stressors and Concerns: Patient reported she has  been feeling better in recent weeks. Has a job lined up in [employer]. Just waiting for a better weather. Went with her partner to visit [place] as she and her partner are moving in Fall there. Feels this time that the move will be better this time.  Has been though thinking about what might happen as far as job goes over there while her partner is in school. Not sure how she will be treated there. Much time was then spent processing some thoughts that pily around her identity and the society. Discussed ways to appreciate herself for who she is Vs joining anyone who would try to loath her. Continue to work on confidence and self evaluation. Expressed satisfaction with today's intervention. Will be reflection on Mother Alva's quotes. Her next visit is in 2 weeks.      Treatment Objective(s) Addressed in This Session:   Identify negative self-talk and behaviors: challenge core beliefs, myths, and actions     Intervention: Reviewed     Situation        Automatic Thoughts  Cognitive Distortions      Feelings        Behavior        Questioning Thoughts          MI Intervention: Expressed Empathy/Understanding, Supported Autonomy, Collaboration, Evocation, Permission to raise concern or advise and Open-ended questions     Change Talk Expressed by the Patient: Taking steps    Provider Response to Change Talk: E - Evoked more info from patient about behavior change, A - Affirmed patient's thoughts, decisions, or attempts at behavior change, R - Reflected patient's change talk and S - Summarized patient's change talk statements    Assessments completed prior to visit: 12/07/2022    The following assessments were completed by patient for this visit:  PHQ2:       1/24/2023     8:21 AM 8/30/2019     7:43 AM 4/25/2018    11:45 AM 6/16/2016    10:08 AM 6/9/2014     4:15 PM   PHQ-2 ( 1999 [employer])   Q1: Little interest or pleasure in doing things 1 3 0 0 0   Q2: Feeling down, depressed or hopeless 2 2 0 0 0   PHQ-2 Score 3 5 0 0  0   PHQ-2 Total Score (12-17 Years)- Positive if 3 or more points; Administer PHQ-A if positive  5      Q1: Little interest or pleasure in doing things Several days       Q2: Feeling down, depressed or hopeless More than half the days       PHQ-2 Score 3         PHQ9:       11/8/2021     4:24 PM 5/16/2022     4:27 PM 11/28/2022     2:10 PM 1/2/2023     2:47 PM 1/24/2023     8:21 AM 3/1/2023     7:54 AM 4/4/2023     6:55 AM   PHQ-9 SCORE   PHQ-9 Total Score MyChart    12 (Moderate depression) 9 (Mild depression) 12 (Moderate depression) 6 (Mild depression)   PHQ-9 Total Score 6 4 14 12 9 12 6     GAD2:       11/29/2022    10:24 AM 1/2/2023     2:48 PM 3/1/2023     7:55 AM 4/4/2023     6:55 AM   LISA-2   Feeling nervous, anxious, or on edge 1 1 1 1   Not being able to stop or control worrying 1 0 0 1   LISA-2 Total Score 2 1 1 2     GAD7:       6/18/2020     8:53 AM 4/26/2021     3:20 PM 11/8/2021     4:24 PM 5/16/2022     4:27 PM 11/28/2022     2:10 PM 1/25/2023    10:09 AM 4/4/2023     2:03 PM   LISA-7 SCORE   Total Score      5 (mild anxiety)    Total Score 6 11 7 5 7 5 5     CAGE-AID:       11/29/2022    10:28 AM   CAGE-AID Total Score   Total Score 2   Total Score MyChart 2 (A total score of 2 or greater is considered clinically significant)     Sebastian Suicide Severity Rating Scale (Lifetime/Recent)      10/31/2019     9:03 AM   Sebastian Suicide Severity Rating (Lifetime/Recent)   Wish to be Dead (Lifetime) Yes   Non-Specific Active Suicidal Thoughts (Lifetime) Yes   RETIRED: 1. Wish to be Dead (Recent) Yes   RETIRED: 2. Non-Specific Active Suicidal Thoughts (Recent) Yes   3. Active Suicidal Ideation with any Methods (Not Plan) Without Intent to Act (Lifetime) No   RETIRED: 3. Active Suicidal Ideation with any Methods (Not Plan) Without Intent to Act (Recent) No   RETIRE: 4. Active Suicidal Ideation with Some Intent to Act, Without Specific Plan (Lifetime) No   4. Active Suicidal Ideation with Some Intent to Act,  Without Specific Plan (Recent) No   RETIRE: 5. Active Suicidal Ideation with Specific Plan and Intent (Lifetime) No   RETIRED: 5. Active Suicidal Ideation with Specific Plan and Intent (Recent) No   Actual Attempt (Lifetime) No   Actual Attempt (Past 3 Months) No   Has subject engaged in non-suicidal self-injurious behavior? (Lifetime) No   Has subject engaged in non-suicidal self-injurious behavior? (Past 3 Months) No   Interrupted Attempts (Lifetime) No   Interrupted Attempts (Past 3 Months) No   Aborted or Self-Interrupted Attempt (Lifetime) No   Aborted or Self-Interrupted Attempt (Past 3 Months) No   Preparatory Acts or Behavior (Lifetime) No   Preparatory Acts or Behavior (Past 3 Months) No       ASSESSMENT: Current Emotional / Mental Status (status of significant symptoms):   Risk status (Self / Other harm or suicidal ideation)   Patient denies current fears or concerns for personal safety.   Patient denies current or recent suicidal ideation or behaviors.   Patient denies current or recent homicidal ideation or behaviors.   Patient denies current or recent self injurious behavior or ideation.   Patient denies other safety concerns.   Patient reports there has been no change in risk factors since their last session.     Patient reports there has been no change in protective factors since their last session.     Recommended that patient call 911 or go to the local ED should there be a change in any of these risk factors.     Appearance:   Appropriate    Eye Contact:   Good    Psychomotor Behavior: Normal    Attitude:   Cooperative    Orientation:   Person Place Time Situation   Speech    Rate / Production: Normal     Volume:  Normal    Mood:    Normal   Affect:    Appropriate    Thought Content:  Clear    Thought Form:  Coherent  Logical    Insight:    Good      Medication Review:   No changes to current psychiatric medication(s)     Medication Compliance:   Yes     Changes in Health Issues:   None  reported     Chemical Use Review:   Substance Use: Chemical use reviewed, no active concerns identified      Tobacco Use: No current tobacco use.      Diagnosis:  1. Generalized anxiety disorder      Collateral Reports Completed:   Routed note to PCP    PLAN: (Patient Tasks / Therapist Tasks / Other)  Patient will use her safety plan as needed.   Patient will review the CBT skills sent via [employer]  Patient will reflect on today's visit and made a sounding decision: will  continue to define Happiness in her own terms.   Patient next visit is on 3/16- will be called if any opening sooner    Francisco Smith, LICSW                                                ________________________________________________________________    Individual Treatment Plan    Patient's Name: Iker Edge  YOB: 2000    Date of Creation: 1/03/2023    Date Treatment Plan Last Reviewed/Revised: 1/03/2023    DSM5 Diagnoses: 296.32 (F33.1) Major Depressive Disorder, Recurrent Episode, Moderate _ and With anxious distress or 300.00 (F41.9) Unspecified Anxiety Disorder     Psychosocial / Contextual Factors: Long standing history of depression since age 12-13. Can not pin point what was happening around that age.ongoing SI.     PROMIS (reviewed every 90 days): 26    Referral / Collaboration:  Referral to another professional/service is not indicated at this time..    Anticipated number of session for this episode of care: 9-12 sessions  Anticipation frequency of session: Biweekly  Anticipated Duration of each session: 53 or more minutes  Treatment plan will be reviewed in 90 days or when goals have been changed.     MeasurableTreatment Goal(s) related to diagnosis / functional impairment(s)  Goal 1: Patient will keep her depression under control in order to be productive      I will know I've met my goal when the level of depression of 4/4 is reduced  to 3/4 or better by the next review.       Objective #A (Patient  Action)                           Status: Continued - Date(s):10/06/2022   Patient will Improve concentration, focus, and mindfulness in daily activities   Intervention(s)  Therapist will assign homework : practice CBT skills to challenge any distortions and incease confidence.   teach Healthy life styles: sleep, balance diet, exercise and personal hygiene.      Objective #B  Patient will use at least 3 coping skills for depression management in the next 12 weeks   Status: New - Date(s):1/03/2023  Intervention(s)  Therapist will assign homework : make a list of triggers and signs and discuss in the session for input.     Goal 2: Patient will develop healthy cognitive patterns and beliefs about self and the world that lead to alleviation and help prevent the relapse of depression symptoms.     I will know I've met my goal when my level of my depression is reduced from 4/4 to 3/4 or better by the next review.       Objective #A (Patient Action)                          Patient will identify at least 4 stressors which contribute to feelings of depression.  Status: New - Date: 1/03/2023     Intervention(s)  Therapist will teach distraction skills. including seff soothing with the 5 senses.     Objective #B  Patient will Identify negative self-talk and behaviors: challenge core beliefs, myths, and actions.  Status: New - Date: 1/03/2023  Intervention(s)  Therapist will Introduce CBT skills.     Objective #C  Patient will Increase interest, engagement, and pleasure in doing things.  Status: New - Date: 1/03/2023  Intervention(s)  Therapist will Encourage patient to share identify and share thoughts and feelings to increase self confidence .     Goal 3: Client will will stabilize anxiety level while increasing ability to function on a daily basis.     I will know I've met my goal when my anxiety is reduced from 4/4 to 3/4 or better by the next review       Objective #A (Client Action)                Client will Increase  "interest, engagement, and pleasure in doing things.  Status: New - Date:1/03/2023     Intervention(s)  Therapist will provide educational materials on distraction activities.     Objective #B  Client will identify at least 4 fears / thoughts that contribute to feeling anxious.  Status: New - Date: 1/03/2023     Intervention(s)  Therapist will teach how to challenge negative thoughts using CBT skills including the 3 Cs.     Objective #C  Client will use thought-stopping strategy daily to reduce intrusive thoughts.  Status: New - Date:10/19/2022     Intervention(s)              Therapist will Teach how to use CBT: 3 Cs to challenge the NTs as they   present.   Objective #D (Patient Action)                          Status: New - Date: 1/03/2023     Intervention(s)  Therapist will teach emotional recognition/identification. related to behavior change.     Objective #E  Patient will use thought-stopping strategy daily to reduce intrusive thoughts.  Status: New - Date:1/03/2023  Intervention(s)  Therapist will assist the patient Identify any distortions and emotions that affect patient's productivity.     Patient has reviewed and agreed to the above plan.      ANGELO Schaefer  January 4, 2023        [facility]                                       Iker Edge-Now Brit Edge     SAFETY PLAN: Reviewed today 3/01/2023  Step 1: Warning signs / cues (Thoughts, images, mood, situation, behavior) that a crisis may be developing:    Thoughts: \"I'm a burden\", \"I can't do this anymore\" and \"Nothing makes it better\"    Images: obsessive thoughts of death or dying: \" Suicide\", flashbacks and visions of harm: Cutting, burning self.    Thinking Processes: intrusive thoughts (bothersome, unwanted thoughts that come out of nowhere): things done in the past; memories of poor living situation for a year about 5-6 years ago. , highly critical and negative thoughts: \" I don't deserve it, I am not doing " "enough, not good enough\" and depersonalization    Mood: worsening depression, hopelessness, intense anger, agitation and mood swings    Behaviors: isolating/withdrawing , using alcohol, can't stop crying, aggression, not taking care of my responsibilities and increasing frequency and duration of dissociation    Situations: bullying: from co workers( current); schoolmates( past), public shame: how she perceives self and relationship problems   Step 2: Coping strategies - Things I can do to take my mind off of my problems without contacting another person (relaxation technique, physical activity):    Distress Tolerance Strategies:  read a book: Any and Writing; video games    Physical Activities: go for a walk, gardening and stretching     Focus on helpful thoughts:  \"This is temporary\", \"I will get through this\" and \"It always passes\"  Step 3: People and social settings that provide distraction:   Name: Jeremiah ( Partner) Phone:  [phone]   Name:  Emerita ( good friend) Phone:    [phone]    coffee shop and park   Step 4: Remind myself of people and things that are important to me and worth living for:  Partner  Step 5: When I am in crisis, I can ask these people to help me use my safety plan:  Name: Jeremiah ( Partner) Phone:  [phone]  Name:  Emerita ( good friend) Phone:    [phone]  Step 6: Making the environment safe:     remove alcohol and be around others  Step 7: Professionals or agencies I can contact during a crisis:    Suicide Prevention Lifeline: Call or Text 246   Local Crisis Services:  [facility] crisis line 1-366.480.8901.    Call 911 or go to my nearest emergency department.   I helped develop this safety plan and agree to use it when needed.  I have been given a copy of this plan.   Client signature _________________________________________________________________  Today s date:  1/3/2023  Completed by Provider Name/ Credentials: ANGELO Schaefer January 3, 2023  Adapted from " Safety Plan Template 2008 Jazlyn Carreon and Bryan Weber is reprinted with the express permission of the authors.  No portion of the Safety Plan Template may be reproduced without the express, written permission.  You can contact the authors at [email or web address] or juan a@[email or web address].  Answers for HPI/ROS submitted by the patient on 1/2/2023  If you checked off any problems, how difficult have these problems made it for you to do your work, take care of things at home, or get along with other people?: Somewhat difficult  PHQ9 TOTAL SCORE: 12  Answers for HPI/ROS submitted by the patient on 3/1/2023  If you checked off any problems, how difficult have these problems made it for you to do your work, take care of things at home, or get along with other people?: Not difficult at all  PHQ9 TOTAL SCORE: 12    Answers for HPI/ROS submitted by the patient on 4/4/2023  If you checked off any problems, how difficult have these problems made it for you to do your work, take care of things at home, or get along with other people?: Not difficult at all  PHQ9 TOTAL SCORE: 6

## 2023-04-20 ENCOUNTER — VIRTUAL VISIT (OUTPATIENT)
Dept: PSYCHOLOGY | Facility: CLINIC | Age: 23
End: 2023-04-20
Payer: COMMERCIAL

## 2023-04-20 DIAGNOSIS — F33.0 MDD (MAJOR DEPRESSIVE DISORDER), RECURRENT EPISODE, MILD (H): Primary | ICD-10-CM

## 2023-04-20 PROCEDURE — 90834 PSYTX W PT 45 MINUTES: CPT | Mod: VID | Performed by: SOCIAL WORKER

## 2023-04-20 ASSESSMENT — COLUMBIA-SUICIDE SEVERITY RATING SCALE - C-SSRS
2. HAVE YOU ACTUALLY HAD ANY THOUGHTS OF KILLING YOURSELF?: NO
6. HAVE YOU EVER DONE ANYTHING, STARTED TO DO ANYTHING, OR PREPARED TO DO ANYTHING TO END YOUR LIFE?: NO
TOTAL  NUMBER OF ABORTED OR SELF INTERRUPTED ATTEMPTS SINCE LAST CONTACT: NO
SUICIDE, SINCE LAST CONTACT: NO
TOTAL  NUMBER OF INTERRUPTED ATTEMPTS SINCE LAST CONTACT: NO
ATTEMPT SINCE LAST CONTACT: NO
1. SINCE LAST CONTACT, HAVE YOU WISHED YOU WERE DEAD OR WISHED YOU COULD GO TO SLEEP AND NOT WAKE UP?: NO

## 2023-04-20 ASSESSMENT — PATIENT HEALTH QUESTIONNAIRE - PHQ9
10. IF YOU CHECKED OFF ANY PROBLEMS, HOW DIFFICULT HAVE THESE PROBLEMS MADE IT FOR YOU TO DO YOUR WORK, TAKE CARE OF THINGS AT HOME, OR GET ALONG WITH OTHER PEOPLE: NOT DIFFICULT AT ALL
SUM OF ALL RESPONSES TO PHQ QUESTIONS 1-9: 2
SUM OF ALL RESPONSES TO PHQ QUESTIONS 1-9: 2

## 2023-04-20 NOTE — PROGRESS NOTES
[facility]                                      Progress Note    Patient Name: Iker Edge  Date:4/20/2023      Service Type: Individual      Session Start Time: 14:01 Session End Time:14:53     Session Length: 52    Session #:5    Attendees: Client    Service Modality:  Video Visit:      Provider verified identity through the following two step process.  Patient provided:  Patient is known previously to provider    Telemedicine Visit: The patient's condition can be safely assessed and treated via synchronous audio and visual telemedicine encounter.      Reason for Telemedicine Visit: Services only offered telehealth    Originating Site (Patient Location): Patient's home    Distant Site (Provider Location): Provider Remote Setting    Consent:  The patient/guardian has verbally consented to: the potential risks and benefits of telemedicine (video visit) versus in person care; bill my insurance or make self-payment for services provided; and responsibility for payment of non-covered services.     Patient would like the video invitation sent by:  My Chart    Mode of Communication:  Video Conference via [employer]    Distant Location (Provider):  Off-site    As the provider I attest to compliance with applicable laws and regulations related to telemedicine.    DATA  Interactive Complexity: No .      Crisis: No      Progress Since Last Session (Related to Symptoms / Goals / Homework):   Symptoms: Some improvement. no SI this week    Homework: Partially completed      Episode of Care Goals: Satisfactory progress - ACTION (Actively working towards change); Intervened by reinforcing change plan / affirming steps taken    Current / Ongoing Stressors and Concerns: Patient reports doing much better 3 months in sessions. She notes it helps to challenge her own traps and replace them with some healthy ones. Shares excitement about moving to [place] with her partner. She also notes she is challenging herself  with connecting with family members. Had a good experience with her grand mother and cousins. Continue to work on feeling comfortable with connection, her own thoughts and preparing for the move. Notes weight on the pros and cons; cost and benefits of her thoughts is helping her to see things in a better clarity.   Her next visit is in 2 weeks.       Treatment Objective(s) Addressed in This Session:   Identify negative self-talk and behaviors: challenge core beliefs, myths, and actions     Intervention: Reviewed her TP today. Making progress using the 3 CS.     Situation        Automatic Thoughts  Cognitive Distortions      Feelings        Behavior        Questioning Thoughts          MI Intervention: Expressed Empathy/Understanding, Supported Autonomy, Collaboration, Evocation, Permission to raise concern or advise and Open-ended questions     Change Talk Expressed by the Patient: Taking steps    Provider Response to Change Talk: E - Evoked more info from patient about behavior change, A - Affirmed patient's thoughts, decisions, or attempts at behavior change, R - Reflected patient's change talk and S - Summarized patient's change talk statements    Assessments completed prior to visit: 12/07/2022    The following assessments were completed by patient for this visit:  PHQ2:       1/24/2023     8:21 AM 8/30/2019     7:43 AM 4/25/2018    11:45 AM 6/16/2016    10:08 AM 6/9/2014     4:15 PM   PHQ-2 ( 1999 [employer])   Q1: Little interest or pleasure in doing things 1 3 0 0 0   Q2: Feeling down, depressed or hopeless 2 2 0 0 0   PHQ-2 Score 3 5 0 0 0   PHQ-2 Total Score (12-17 Years)- Positive if 3 or more points; Administer PHQ-A if positive  5      Q1: Little interest or pleasure in doing things Several days       Q2: Feeling down, depressed or hopeless More than half the days       PHQ-2 Score 3         PHQ9:       5/16/2022     4:27 PM 11/28/2022     2:10 PM 1/2/2023     2:47 PM 1/24/2023     8:21 AM 3/1/2023     7:54  AM 4/4/2023     6:55 AM 4/20/2023    10:22 AM   PHQ-9 SCORE   PHQ-9 Total Score MyChart   12 (Moderate depression) 9 (Mild depression) 12 (Moderate depression) 6 (Mild depression) 2 (Minimal depression)   PHQ-9 Total Score 4 14 12 9 12 6 2     GAD2:       11/29/2022    10:24 AM 1/2/2023     2:48 PM 3/1/2023     7:55 AM 4/4/2023     6:55 AM 4/20/2023    10:23 AM   LISA-2   Feeling nervous, anxious, or on edge 1 1 1 1 0   Not being able to stop or control worrying 1 0 0 1 0   LISA-2 Total Score 2 1 1 2 0     GAD7:       6/18/2020     8:53 AM 4/26/2021     3:20 PM 11/8/2021     4:24 PM 5/16/2022     4:27 PM 11/28/2022     2:10 PM 1/25/2023    10:09 AM 4/4/2023     2:03 PM   LISA-7 SCORE   Total Score      5 (mild anxiety)    Total Score 6 11 7 5 7 5 5     CAGE-AID:       11/29/2022    10:28 AM 4/20/2023     2:06 PM   CAGE-AID Total Score   Total Score 2 0   Total Score MyChart 2 (A total score of 2 or greater is considered clinically significant)      George Suicide Severity Rating Scale (Lifetime/Recent)      10/31/2019     9:03 AM   George Suicide Severity Rating (Lifetime/Recent)   Wish to be Dead (Lifetime) Yes   Non-Specific Active Suicidal Thoughts (Lifetime) Yes   RETIRED: 1. Wish to be Dead (Recent) Yes   RETIRED: 2. Non-Specific Active Suicidal Thoughts (Recent) Yes   3. Active Suicidal Ideation with any Methods (Not Plan) Without Intent to Act (Lifetime) No   RETIRED: 3. Active Suicidal Ideation with any Methods (Not Plan) Without Intent to Act (Recent) No   RETIRE: 4. Active Suicidal Ideation with Some Intent to Act, Without Specific Plan (Lifetime) No   4. Active Suicidal Ideation with Some Intent to Act, Without Specific Plan (Recent) No   RETIRE: 5. Active Suicidal Ideation with Specific Plan and Intent (Lifetime) No   RETIRED: 5. Active Suicidal Ideation with Specific Plan and Intent (Recent) No   Actual Attempt (Lifetime) No   Actual Attempt (Past 3 Months) No   Has subject engaged in non-suicidal  self-injurious behavior? (Lifetime) No   Has subject engaged in non-suicidal self-injurious behavior? (Past 3 Months) No   Interrupted Attempts (Lifetime) No   Interrupted Attempts (Past 3 Months) No   Aborted or Self-Interrupted Attempt (Lifetime) No   Aborted or Self-Interrupted Attempt (Past 3 Months) No   Preparatory Acts or Behavior (Lifetime) No   Preparatory Acts or Behavior (Past 3 Months) No       ASSESSMENT: Current Emotional / Mental Status (status of significant symptoms):   Risk status (Self / Other harm or suicidal ideation)   Patient denies current fears or concerns for personal safety.   Patient denies current or recent suicidal ideation or behaviors.   Patient denies current or recent homicidal ideation or behaviors.   Patient denies current or recent self injurious behavior or ideation.   Patient denies other safety concerns.   Patient reports there has been no change in risk factors since their last session.     Patient reports there has been no change in protective factors since their last session.     Recommended that patient call 911 or go to the local ED should there be a change in any of these risk factors.     Appearance:   Appropriate    Eye Contact:   Good    Psychomotor Behavior: Normal    Attitude:   Cooperative    Orientation:   Person Place Time Situation   Speech    Rate / Production: Normal     Volume:  Normal    Mood:    Normal   Affect:    Appropriate    Thought Content:  Clear    Thought Form:  Coherent  Logical    Insight:    Good      Medication Review:   No changes to current psychiatric medication(s)     Medication Compliance:   Yes     Changes in Health Issues:   None reported     Chemical Use Review:   Substance Use: Chemical use reviewed, no active concerns identified      Tobacco Use: No current tobacco use.      Diagnosis:  1. MDD (major depressive disorder), recurrent episode, mild (H)      Collateral Reports Completed:   Routed note to PCP    PLAN: (Patient Tasks /  Therapist Tasks / Other)  Patient will use her safety plan as needed.   Patient will review the CBT skills sent via [employer]  Patient will reflect on today's visit and made a sounding decision: will  continue to define Happiness in her own terms.   Patient next visit is on 3/16- will be called if any opening sooner    Francisco Smith, LICSW                                                ________________________________________________________________    Individual Treatment Plan    Patient's Name: Iker Edge  YOB: 2000    Date of Creation: 1/03/2023    Date Treatment Plan Last Reviewed/Revised: 1/03/2023    DSM5 Diagnoses: 296.32 (F33.1) Major Depressive Disorder, Recurrent Episode, Moderate _ and With anxious distress or 300.00 (F41.9) Unspecified Anxiety Disorder     Psychosocial / Contextual Factors: Long standing history of depression since age 12-13. Can not pin point what was happening around that age.ongoing SI.     PROMIS (reviewed every 90 days): 26    Referral / Collaboration:  Referral to another professional/service is not indicated at this time..    Anticipated number of session for this episode of care: 9-12 sessions  Anticipation frequency of session: Biweekly  Anticipated Duration of each session: 53 or more minutes  Treatment plan will be reviewed in 90 days or when goals have been changed.     MeasurableTreatment Goal(s) related to diagnosis / functional impairment(s)  Goal 1: Patient will keep her depression under control in order to be productive      I will know I've met my goal when the level of depression of 4/4 is reduced  to 3/4 or better by the next review.       Objective #A (Patient Action)                           Status: Continued - Date(s):10/06/2022   Patient will Improve concentration, focus, and mindfulness in daily activities   Intervention(s)  Therapist will assign homework : practice CBT skills to challenge any distortions and incease confidence.   teach  Healthy life styles: sleep, balance diet, exercise and personal hygiene.      Objective #B  Patient will use at least 3 coping skills for depression management in the next 12 weeks   Status: New - Date(s):1/03/2023  Intervention(s)  Therapist will assign homework : make a list of triggers and signs and discuss in the session for input.     Goal 2: Patient will develop healthy cognitive patterns and beliefs about self and the world that lead to alleviation and help prevent the relapse of depression symptoms.     I will know I've met my goal when my level of my depression is reduced from 4/4 to 3/4 or better by the next review.       Objective #A (Patient Action)                          Patient will identify at least 4 stressors which contribute to feelings of depression.  Status: New - Date: 1/03/2023     Intervention(s)  Therapist will teach distraction skills. including seff soothing with the 5 senses.     Objective #B  Patient will Identify negative self-talk and behaviors: challenge core beliefs, myths, and actions.  Status: New - Date: 1/03/2023  Intervention(s)  Therapist will Introduce CBT skills.     Objective #C  Patient will Increase interest, engagement, and pleasure in doing things.  Status: New - Date: 1/03/2023  Intervention(s)  Therapist will Encourage patient to share identify and share thoughts and feelings to increase self confidence .     Goal 3: Client will will stabilize anxiety level while increasing ability to function on a daily basis.     I will know I've met my goal when my anxiety is reduced from 4/4 to 3/4 or better by the next review       Objective #A (Client Action)                Client will Increase interest, engagement, and pleasure in doing things.  Status: New - Date:1/03/2023     Intervention(s)  Therapist will provide educational materials on distraction activities.     Objective #B  Client will identify at least 4 fears / thoughts that contribute to feeling anxious.  Status:  "New - Date: 1/03/2023     Intervention(s)  Therapist will teach how to challenge negative thoughts using CBT skills including the 3 Cs.     Objective #C  Client will use thought-stopping strategy daily to reduce intrusive thoughts.  Status: New - Date:10/19/2022     Intervention(s)              Therapist will Teach how to use CBT: 3 Cs to challenge the NTs as they   present.   Objective #D (Patient Action)                          Status: New - Date: 1/03/2023     Intervention(s)  Therapist will teach emotional recognition/identification. related to behavior change.     Objective #E  Patient will use thought-stopping strategy daily to reduce intrusive thoughts.  Status: New - Date:1/03/2023  Intervention(s)  Therapist will assist the patient Identify any distortions and emotions that affect patient's productivity.     Patient has reviewed and agreed to the above plan.      Francisco Smith, [facility]  January 4, 2023        [facility]                                       Iker Edge-Now Brit Edge     SAFETY PLAN: Reviewed today 3/01/2023  Step 1: Warning signs / cues (Thoughts, images, mood, situation, behavior) that a crisis may be developing:    Thoughts: \"I'm a burden\", \"I can't do this anymore\" and \"Nothing makes it better\"    Images: obsessive thoughts of death or dying: \" Suicide\", flashbacks and visions of harm: Cutting, burning self.    Thinking Processes: intrusive thoughts (bothersome, unwanted thoughts that come out of nowhere): things done in the past; memories of poor living situation for a year about 5-6 years ago. , highly critical and negative thoughts: \" I don't deserve it, I am not doing enough, not good enough\" and depersonalization    Mood: worsening depression, hopelessness, intense anger, agitation and mood swings    Behaviors: isolating/withdrawing , using alcohol, can't stop crying, aggression, not taking care of my responsibilities and increasing frequency and duration " "of dissociation    Situations: bullying: from co workers( current); schoolmates( past), public shame: how she perceives self and relationship problems   Step 2: Coping strategies - Things I can do to take my mind off of my problems without contacting another person (relaxation technique, physical activity):    Distress Tolerance Strategies:  read a book: Any and Writing; video games    Physical Activities: go for a walk, gardening and stretching     Focus on helpful thoughts:  \"This is temporary\", \"I will get through this\" and \"It always passes\"  Step 3: People and social settings that provide distraction:   Name: Jeremiah ( Partner) Phone:  [phone]   Name:  Emerita ( good friend) Phone:    [phone]    coffee shop and park   Step 4: Remind myself of people and things that are important to me and worth living for:  Partner  Step 5: When I am in crisis, I can ask these people to help me use my safety plan:  Name: Jeremiah ( Partner) Phone:  [phone]  Name:  Emerita ( good friend) Phone:    [phone]  Step 6: Making the environment safe:     remove alcohol and be around others  Step 7: Professionals or agencies I can contact during a crisis:    Suicide Prevention Lifeline: Call or Text 484   Local Crisis Services:  [facility] crisis line [phone].    Call 911 or go to my nearest emergency department.   I helped develop this safety plan and agree to use it when needed.  I have been given a copy of this plan.   Client signature _________________________________________________________________  Today s date:  1/3/2023  Completed by Provider Name/ Credentials: ANGELO Schaefer January 3, 2023  Adapted from Safety Plan Template 2008 Jazlyn Carreon and Bryan Weber is reprinted with the express permission of the authors.  No portion of the Safety Plan Template may be reproduced without the express, written permission.  You can contact the authors at [email or web address] or " [email or web address].  Answers for HPI/ROS submitted by the patient on 1/2/2023  If you checked off any problems, how difficult have these problems made it for you to do your work, take care of things at home, or get along with other people?: Somewhat difficult  PHQ9 TOTAL SCORE: 12  Answers for HPI/ROS submitted by the patient on 3/1/2023  If you checked off any problems, how difficult have these problems made it for you to do your work, take care of things at home, or get along with other people?: Not difficult at all  PHQ9 TOTAL SCORE: 12    Answers for HPI/ROS submitted by the patient on 4/4/2023  If you checked off any problems, how difficult have these problems made it for you to do your work, take care of things at home, or get along with other people?: Not difficult at all  PHQ9 TOTAL SCORE: 6    Answers for HPI/ROS submitted by the patient on 4/20/2023  If you checked off any problems, how difficult have these problems made it for you to do your work, take care of things at home, or get along with other people?: Not difficult at all  PHQ9 TOTAL SCORE: 2

## 2023-05-04 ENCOUNTER — VIRTUAL VISIT (OUTPATIENT)
Dept: PSYCHOLOGY | Facility: CLINIC | Age: 23
End: 2023-05-04
Payer: COMMERCIAL

## 2023-05-04 DIAGNOSIS — F33.0 MDD (MAJOR DEPRESSIVE DISORDER), RECURRENT EPISODE, MILD (H): Primary | ICD-10-CM

## 2023-05-04 PROCEDURE — 90834 PSYTX W PT 45 MINUTES: CPT | Mod: VID | Performed by: SOCIAL WORKER

## 2023-05-04 NOTE — PROGRESS NOTES
[facility]                                      Progress Note    Patient Name: Iker Edge  Date:5/04/2023      Service Type: Individual      Session Start Time: 11:02 Session End Time:11:54     Session Length: 52    Session #:6    Attendees: Client    Service Modality:  Video Visit:      Provider verified identity through the following two step process.  Patient provided:  Patient is known previously to provider    Telemedicine Visit: The patient's condition can be safely assessed and treated via synchronous audio and visual telemedicine encounter.      Reason for Telemedicine Visit: Services only offered telehealth    Originating Site (Patient Location): Patient's home    Distant Site (Provider Location): Provider Remote Setting    Consent:  The patient/guardian has verbally consented to: the potential risks and benefits of telemedicine (video visit) versus in person care; bill my insurance or make self-payment for services provided; and responsibility for payment of non-covered services.     Patient would like the video invitation sent by:  My Chart    Mode of Communication:  Video Conference via [employer]    Distant Location (Provider):  Off-site    As the provider I attest to compliance with applicable laws and regulations related to telemedicine.    DATA  Interactive Complexity: No .      Crisis: No      Progress Since Last Session (Related to Symptoms / Goals / Homework):   Symptoms: Some improvement. no SI this week    Homework: Partially completed      Episode of Care Goals: Satisfactory progress - ACTION (Actively working towards change); Intervened by reinforcing change plan / affirming steps taken    Current / Ongoing Stressors and Concerns: Patient stated she has been doing pretty good and feeling happy notes no SI for several weeks now. Notes more in control of her own thoughts. Noticing higher confidence and feeling resilience.  Has been working for the last couple of weeks.  Likes her job so far. Has been also preparing for her move with her partner for Fall education in [facility]. Notes some challenges and feels she needs to continue working on confidence and self evaluation Her next visit is in 2 weeks.       Treatment Objective(s) Addressed in This Session:   Identify negative self-talk and behaviors: challenge core beliefs, myths, and actions     Intervention: Reviewed her TP today. Making progress using the 3 CS.     Situation        Automatic Thoughts  Cognitive Distortions      Feelings        Behavior        Questioning Thoughts          MI Intervention: Expressed Empathy/Understanding, Supported Autonomy, Collaboration, Evocation, Permission to raise concern or advise and Open-ended questions     Change Talk Expressed by the Patient: Taking steps    Provider Response to Change Talk: E - Evoked more info from patient about behavior change, A - Affirmed patient's thoughts, decisions, or attempts at behavior change, R - Reflected patient's change talk and S - Summarized patient's change talk statements    Assessments completed prior to visit: 12/07/2022    The following assessments were completed by patient for this visit:  PHQ2:       1/24/2023     8:21 AM 8/30/2019     7:43 AM 4/25/2018    11:45 AM 6/16/2016    10:08 AM 6/9/2014     4:15 PM   PHQ-2 ( 1999 [employer])   Q1: Little interest or pleasure in doing things 1 3 0 0 0   Q2: Feeling down, depressed or hopeless 2 2 0 0 0   PHQ-2 Score 3 5 0 0 0   PHQ-2 Total Score (12-17 Years)- Positive if 3 or more points; Administer PHQ-A if positive  5      Q1: Little interest or pleasure in doing things Several days       Q2: Feeling down, depressed or hopeless More than half the days       PHQ-2 Score 3         PHQ9:       11/28/2022     2:10 PM 1/2/2023     2:47 PM 1/24/2023     8:21 AM 3/1/2023     7:54 AM 4/4/2023     6:55 AM 4/20/2023    10:22 AM 5/3/2023     2:49 PM   PHQ-9 SCORE   PHQ-9 Total Score MyChart  12 (Moderate depression) 9  (Mild depression) 12 (Moderate depression) 6 (Mild depression) 2 (Minimal depression) 2 (Minimal depression)   PHQ-9 Total Score 14 12 9 12 6 2 2     GAD2:       11/29/2022    10:24 AM 1/2/2023     2:48 PM 3/1/2023     7:55 AM 4/4/2023     6:55 AM 4/20/2023    10:23 AM 5/3/2023     2:50 PM   LISA-2   Feeling nervous, anxious, or on edge 1 1 1 1 0 1   Not being able to stop or control worrying 1 0 0 1 0 0   LISA-2 Total Score 2 1 1 2 0 1     GAD7:       6/18/2020     8:53 AM 4/26/2021     3:20 PM 11/8/2021     4:24 PM 5/16/2022     4:27 PM 11/28/2022     2:10 PM 1/25/2023    10:09 AM 4/4/2023     2:03 PM   LISA-7 SCORE   Total Score      5 (mild anxiety)    Total Score 6 11 7 5 7 5 5     CAGE-AID:       11/29/2022    10:28 AM 4/20/2023     2:06 PM 5/4/2023    12:08 PM   CAGE-AID Total Score   Total Score 2 0 0   Total Score MyChart 2 (A total score of 2 or greater is considered clinically significant)       [place] Suicide Severity Rating Scale (Lifetime/Recent)      10/31/2019     9:03 AM   [place] Suicide Severity Rating (Lifetime/Recent)   Wish to be Dead (Lifetime) Yes   Non-Specific Active Suicidal Thoughts (Lifetime) Yes   RETIRED: 1. Wish to be Dead (Recent) Yes   RETIRED: 2. Non-Specific Active Suicidal Thoughts (Recent) Yes   3. Active Suicidal Ideation with any Methods (Not Plan) Without Intent to Act (Lifetime) No   RETIRED: 3. Active Suicidal Ideation with any Methods (Not Plan) Without Intent to Act (Recent) No   RETIRE: 4. Active Suicidal Ideation with Some Intent to Act, Without Specific Plan (Lifetime) No   4. Active Suicidal Ideation with Some Intent to Act, Without Specific Plan (Recent) No   RETIRE: 5. Active Suicidal Ideation with Specific Plan and Intent (Lifetime) No   RETIRED: 5. Active Suicidal Ideation with Specific Plan and Intent (Recent) No   Actual Attempt (Lifetime) No   Actual Attempt (Past 3 Months) No   Has subject engaged in non-suicidal self-injurious behavior? (Lifetime) No   Has  subject engaged in non-suicidal self-injurious behavior? (Past 3 Months) No   Interrupted Attempts (Lifetime) No   Interrupted Attempts (Past 3 Months) No   Aborted or Self-Interrupted Attempt (Lifetime) No   Aborted or Self-Interrupted Attempt (Past 3 Months) No   Preparatory Acts or Behavior (Lifetime) No   Preparatory Acts or Behavior (Past 3 Months) No       ASSESSMENT: Current Emotional / Mental Status (status of significant symptoms):   Risk status (Self / Other harm or suicidal ideation)   Patient denies current fears or concerns for personal safety.   Patient denies current or recent suicidal ideation or behaviors.   Patient denies current or recent homicidal ideation or behaviors.   Patient denies current or recent self injurious behavior or ideation.   Patient denies other safety concerns.   Patient reports there has been no change in risk factors since their last session.     Patient reports there has been no change in protective factors since their last session.     Recommended that patient call 911 or go to the local ED should there be a change in any of these risk factors.     Appearance:   Appropriate    Eye Contact:   Good    Psychomotor Behavior: Normal    Attitude:   Cooperative    Orientation:   Person Place Time Situation   Speech    Rate / Production: Normal     Volume:  Normal    Mood:    Normal   Affect:    Appropriate    Thought Content:  Clear    Thought Form:  Coherent  Logical    Insight:    Good      Medication Review:   No changes to current psychiatric medication(s)     Medication Compliance:   Yes     Changes in Health Issues:   None reported     Chemical Use Review:   Substance Use: Chemical use reviewed, no active concerns identified      Tobacco Use: No current tobacco use.      Diagnosis:  1. MDD (major depressive disorder), recurrent episode, mild (H)      Collateral Reports Completed:   Routed note to PCP    PLAN: (Patient Tasks / Therapist Tasks / Other):  Patient will use her  safety plan as needed.   Patient will review the CBT skills sent via [email or web address]  Patient will reflect on today's visit and made a sounding decision: Will  continue to define Happiness in her own terms.   Patient will go hiking this weekend  Patient next visit is on  5/17/2023    Francisco Smith, LICSW                                                ________________________________________________________________    Individual Treatment Plan    Patient's Name: Iker Edge  YOB: 2000    Date of Creation: 1/03/2023    Date Treatment Plan Last Reviewed/Revised:    DSM5 Diagnoses: 296.32 (F33.1) Major Depressive Disorder, Recurrent Episode, Moderate _ and With anxious distress or 300.00 (F41.9) Unspecified Anxiety Disorder     Psychosocial / Contextual Factors: Long standing history of depression since age 12-13. Can not pin point what was happening around that age.ongoing SI.     PROMIS (reviewed every 90 days):    Referral / Collaboration:  Referral to another professional/service is not indicated at this time..    Anticipated number of session for this episode of care: 9-12 sessions  Anticipation frequency of session: Biweekly  Anticipated Duration of each session: 53 or more minutes  Treatment plan will be reviewed in 90 days or when goals have been changed.     MeasurableTreatment Goal(s) related to diagnosis / functional impairment(s)  Goal 1: Patient will keep her depression under control in order to be productive      I will know I've met my goal when the level of depression of 3/4 is reduced  to 2/4 or better by the next review.       Objective #A (Patient Action)                           Status: Continued - Date: 5/04/2023   Patient will Improve concentration, focus, and mindfulness in daily activities   Intervention(s)  Therapist will assign homework : practice CBT skills to challenge any distortions and incease confidence.   teach Healthy life styles: sleep, balance diet, exercise and  personal hygiene.      Objective #B  Patient will use at least 3 coping skills for depression management in the next 12 weeks   Status:update - Date: 5/04/2023  Intervention(s)  Therapist will assign homework : make a list of triggers and signs and discuss in the session for input.     Goal 2: Patient will develop healthy cognitive patterns and beliefs about self and the world that lead to alleviation and help prevent the relapse of depression symptoms.     I will know I've met my goal when my level of my depression is reduced from 3/4 to 2/4 or better by the next review.       Objective #A (Patient Action)                          Patient will identify at least 4 stressors which contribute to feelings of depression.  Status: update - Date: 5/04/2023     Intervention(s)  Therapist will teach distraction skills. including seff soothing with the 5 senses.     Objective #B  Patient will Identify negative self-talk and behaviors: challenge core beliefs, myths, and actions.  Status: update - Date: 5/04/2023  Intervention(s)  Therapist will Introduce CBT skills.     Objective #C  Patient will Increase interest, engagement, and pleasure in doing things.  Status:update - Date: 5/04/2023  Intervention(s)  Therapist will Encourage patient to share identify and share thoughts and feelings to increase self confidence .     Goal 3: Client will will stabilize anxiety level while increasing ability to function on a daily basis.     I will know I've met my goal when my anxiety is reduced from 4/4 to 3/4 or better by the next review       Objective #A (Client Action)                Client will Increase interest, engagement, and pleasure in doing things.  Status: update - Date: 5/04/2023     Intervention(s)  Therapist will provide educational materials on distraction activities.     Objective #B  Client will identify at least 4 fears / thoughts that contribute to feeling anxious.  Status: update - Date:  "5/04/2023     Intervention(s)  Therapist will teach how to challenge negative thoughts using CBT skills including the 3 Cs.     Objective #C  Client will use thought-stopping strategy daily to reduce intrusive thoughts.  Status:update - Date: 5/04/2023     Intervention(s)              Therapist will Teach how to use CBT: 3 Cs to challenge the NTs as they   present.   Objective #D (Patient Action)                          Status: update - Date: 5/04/2023     Intervention(s)  Therapist will teach emotional recognition/identification. related to behavior change.     Objective #E  Patient will use thought-stopping strategy daily to reduce intrusive thoughts.  Status::  update - Date: 5/04/2023  Intervention(s)  Therapist will assist the patient Identify any distortions and emotions that affect patient's productivity.     Patient has reviewed and agreed to the above plan.      Francisco Smith, [facility]  5/04/2023         [facility]                                       Iker Edge-Now Brit Edge     SAFETY PLAN: Reviewed today 3/01/2023; 5/04/2023- Has not had SI for the last several weeks.  Step 1: Warning signs / cues (Thoughts, images, mood, situation, behavior) that a crisis may be developing:    Thoughts: \"I'm a burden\", \"I can't do this anymore\" and \"Nothing makes it better\"    Images: obsessive thoughts of death or dying: \" Suicide\", flashbacks and visions of harm: Cutting, burning self.    Thinking Processes: intrusive thoughts (bothersome, unwanted thoughts that come out of nowhere): things done in the past; memories of poor living situation for a year about 5-6 years ago. , highly critical and negative thoughts: \" I don't deserve it, I am not doing enough, not good enough\" and depersonalization    Mood: worsening depression, hopelessness, intense anger, agitation and mood swings    Behaviors: isolating/withdrawing , using alcohol, can't stop crying, aggression, not taking care of my " "responsibilities and increasing frequency and duration of dissociation    Situations: bullying: from co workers( current); schoolmates( past), public shame: how she perceives self and relationship problems   Step 2: Coping strategies - Things I can do to take my mind off of my problems without contacting another person (relaxation technique, physical activity):    Distress Tolerance Strategies:  read a book: Any and Writing; video games    Physical Activities: go for a walk, gardening and stretching     Focus on helpful thoughts:  \"This is temporary\", \"I will get through this\" and \"It always passes\"  Step 3: People and social settings that provide distraction:   Name: Jeremiah ( Partner) Phone:  [phone]   Name:  Emerita ( good friend) Phone:    [phone]    coffee shop and park   Step 4: Remind myself of people and things that are important to me and worth living for:  Partner  Step 5: When I am in crisis, I can ask these people to help me use my safety plan:  Name: Jeremiah ( Partner) Phone:  [phone]  Name:  Emerita ( good friend) Phone:    [phone]  Step 6: Making the environment safe:     remove alcohol and be around others  Step 7: Professionals or agencies I can contact during a crisis:    Suicide Prevention Lifeline: Call or Text 535   Local Crisis Services:  [facility] crisis line [phone].    Call 911 or go to my nearest emergency department.   I helped develop this safety plan and agree to use it when needed.  I have been given a copy of this plan.   Client signature _________________________________________________________________  Today s date:  1/3/2023  Completed by Provider Name/ Credentials: ANGELO Schaefer January 3, 2023  Adapted from Safety Plan Template 2008 Jazlyn Carreon and Bryan Weber is reprinted with the express permission of the authors.  No portion of the Safety Plan Template may be reproduced without the express, written permission.  You can contact the " authors at [email or web address] or juan [email or web address].  Answers for HPI/ROS submitted by the patient on 1/2/2023  If you checked off any problems, how difficult have these problems made it for you to do your work, take care of things at home, or get along with other people?: Somewhat difficult  PHQ9 TOTAL SCORE: 12  Answers for HPI/ROS submitted by the patient on 3/1/2023  If you checked off any problems, how difficult have these problems made it for you to do your work, take care of things at home, or get along with other people?: Not difficult at all  PHQ9 TOTAL SCORE: 12    Answers for HPI/ROS submitted by the patient on 4/4/2023  If you checked off any problems, how difficult have these problems made it for you to do your work, take care of things at home, or get along with other people?: Not difficult at all  PHQ9 TOTAL SCORE: 6    Answers for HPI/ROS submitted by the patient on 4/20/2023  If you checked off any problems, how difficult have these problems made it for you to do your work, take care of things at home, or get along with other people?: Not difficult at all  PHQ9 TOTAL SCORE: 2    Answers for HPI/ROS submitted by the patient on 5/3/2023  If you checked off any problems, how difficult have these problems made it for you to do your work, take care of things at home, or get along with other people?: Not difficult at all  PHQ9 TOTAL SCORE: 2

## 2023-05-17 ENCOUNTER — LAB (OUTPATIENT)
Dept: LAB | Facility: CLINIC | Age: 23
End: 2023-05-17
Payer: COMMERCIAL

## 2023-05-17 ENCOUNTER — VIRTUAL VISIT (OUTPATIENT)
Dept: PSYCHOLOGY | Facility: CLINIC | Age: 23
End: 2023-05-17
Payer: COMMERCIAL

## 2023-05-17 DIAGNOSIS — F41.1 GENERALIZED ANXIETY DISORDER: Primary | ICD-10-CM

## 2023-05-17 DIAGNOSIS — F64.9 GENDER DYSPHORIA: ICD-10-CM

## 2023-05-17 LAB
ANION GAP SERPL CALCULATED.3IONS-SCNC: 11 MMOL/L (ref 7–15)
BUN SERPL-MCNC: 10.9 MG/DL (ref 6–20)
CALCIUM SERPL-MCNC: 9.7 MG/DL (ref 8.6–10)
CHLORIDE SERPL-SCNC: 104 MMOL/L (ref 98–107)
CREAT SERPL-MCNC: 0.75 MG/DL (ref 0.51–1.17)
DEPRECATED HCO3 PLAS-SCNC: 24 MMOL/L (ref 22–29)
ESTRADIOL SERPL-MCNC: 107 PG/ML
GFR SERPL CREATININE-BSD FRML MDRD: >90 ML/MIN/1.73M2
GLUCOSE SERPL-MCNC: 106 MG/DL (ref 70–99)
HBA1C MFR BLD: 5.6 % (ref 0–5.6)
POTASSIUM SERPL-SCNC: 4.6 MMOL/L (ref 3.4–5.3)
SODIUM SERPL-SCNC: 139 MMOL/L (ref 136–145)

## 2023-05-17 PROCEDURE — 83036 HEMOGLOBIN GLYCOSYLATED A1C: CPT

## 2023-05-17 PROCEDURE — 82670 ASSAY OF TOTAL ESTRADIOL: CPT

## 2023-05-17 PROCEDURE — 90834 PSYTX W PT 45 MINUTES: CPT | Mod: VID | Performed by: SOCIAL WORKER

## 2023-05-17 PROCEDURE — 80048 BASIC METABOLIC PNL TOTAL CA: CPT

## 2023-05-17 PROCEDURE — 84403 ASSAY OF TOTAL TESTOSTERONE: CPT

## 2023-05-17 PROCEDURE — 36415 COLL VENOUS BLD VENIPUNCTURE: CPT

## 2023-05-17 NOTE — PROGRESS NOTES
[facility]                                      Progress Note    Patient Name: Iker Edge  Date:5/17/2023      Service Type: Individual      Session Start Time: 11:02 Session End Time:11:54     Session Length: 52    Session #:7    Attendees: Client    Service Modality:  Video Visit:      Provider verified identity through the following two step process.  Patient provided:  Patient is known previously to provider    Telemedicine Visit: The patient's condition can be safely assessed and treated via synchronous audio and visual telemedicine encounter.      Reason for Telemedicine Visit: Services only offered telehealth    Originating Site (Patient Location): Patient's home    Distant Site (Provider Location): Provider Remote Setting    Consent:  The patient/guardian has verbally consented to: the potential risks and benefits of telemedicine (video visit) versus in person care; bill my insurance or make self-payment for services provided; and responsibility for payment of non-covered services.     Patient would like the video invitation sent by:  My Chart    Mode of Communication:  Video Conference via [employer]    Distant Location (Provider):  Off-site    As the provider I attest to compliance with applicable laws and regulations related to telemedicine.    DATA  Interactive Complexity: No .      Crisis: No      Progress Since Last Session (Related to Symptoms / Goals / Homework):   Symptoms: Some improvement. no SI this week    Homework: Achieved / completed to satisfaction      Episode of Care Goals: Satisfactory progress - ACTION (Actively working towards change); Intervened by reinforcing change plan / affirming steps taken    Current / Ongoing Stressors and Concerns: Patient reports she is doing crowley good. Has been in a better mood and has some energy to do things. She is planning a graduation party/birth day for her partner. She is noticing though some issues around moving in Fall.  "Continue to think it is going to be difficult. Has had a bad experience with the move in the past. Also feels she will lose her protective factors. This was processed today. Patient notes a need to keep up with confidence and self compassion. Will use the wedsite \" self -compassion\" by Lexie Joseph. Patient's next visit is in 2 weeks      Treatment Objective(s) Addressed in This Session:   Identify negative self-talk and behaviors: challenge core beliefs, myths, and actions     Intervention: Reviewed her TP today. Making progress using the 3 CS.     Situation        Automatic Thoughts  Cognitive Distortions      Feelings        Behavior        Questioning Thoughts          MI Intervention: Expressed Empathy/Understanding, Supported Autonomy, Collaboration, Evocation, Permission to raise concern or advise and Open-ended questions     Change Talk Expressed by the Patient: Taking steps    Provider Response to Change Talk: E - Evoked more info from patient about behavior change, A - Affirmed patient's thoughts, decisions, or attempts at behavior change, R - Reflected patient's change talk and S - Summarized patient's change talk statements    Assessments completed prior to visit: 12/07/2022    The following assessments were completed by patient for this visit:  PHQ2:       1/24/2023     8:21 AM 8/30/2019     7:43 AM 4/25/2018    11:45 AM 6/16/2016    10:08 AM 6/9/2014     4:15 PM   PHQ-2 ( 1999 [employer])   Q1: Little interest or pleasure in doing things 1 3 0 0 0   Q2: Feeling down, depressed or hopeless 2 2 0 0 0   PHQ-2 Score 3 5 0 0 0   PHQ-2 Total Score (12-17 Years)- Positive if 3 or more points; Administer PHQ-A if positive  5      Q1: Little interest or pleasure in doing things Several days       Q2: Feeling down, depressed or hopeless More than half the days       PHQ-2 Score 3         PHQ9:       11/28/2022     2:10 PM 1/2/2023     2:47 PM 1/24/2023     8:21 AM 3/1/2023     7:54 AM 4/4/2023     6:55 AM 4/20/2023 "    10:22 AM 5/3/2023     2:49 PM   PHQ-9 SCORE   PHQ-9 Total Score MyChart  12 (Moderate depression) 9 (Mild depression) 12 (Moderate depression) 6 (Mild depression) 2 (Minimal depression) 2 (Minimal depression)   PHQ-9 Total Score 14 12 9 12 6 2 2     GAD2:       11/29/2022    10:24 AM 1/2/2023     2:48 PM 3/1/2023     7:55 AM 4/4/2023     6:55 AM 4/20/2023    10:23 AM 5/3/2023     2:50 PM   LISA-2   Feeling nervous, anxious, or on edge 1 1 1 1 0 1   Not being able to stop or control worrying 1 0 0 1 0 0   LISA-2 Total Score 2 1 1 2 0 1     GAD7:       6/18/2020     8:53 AM 4/26/2021     3:20 PM 11/8/2021     4:24 PM 5/16/2022     4:27 PM 11/28/2022     2:10 PM 1/25/2023    10:09 AM 4/4/2023     2:03 PM   LISA-7 SCORE   Total Score      5 (mild anxiety)    Total Score 6 11 7 5 7 5 5     CAGE-AID:       11/29/2022    10:28 AM 4/20/2023     2:06 PM 5/4/2023    12:08 PM   CAGE-AID Total Score   Total Score 2 0 0   Total Score MyChart 2 (A total score of 2 or greater is considered clinically significant)       [place] Suicide Severity Rating Scale (Lifetime/Recent)      10/31/2019     9:03 AM   [place] Suicide Severity Rating (Lifetime/Recent)   Wish to be Dead (Lifetime) Yes   Non-Specific Active Suicidal Thoughts (Lifetime) Yes   RETIRED: 1. Wish to be Dead (Recent) Yes   RETIRED: 2. Non-Specific Active Suicidal Thoughts (Recent) Yes   3. Active Suicidal Ideation with any Methods (Not Plan) Without Intent to Act (Lifetime) No   RETIRED: 3. Active Suicidal Ideation with any Methods (Not Plan) Without Intent to Act (Recent) No   RETIRE: 4. Active Suicidal Ideation with Some Intent to Act, Without Specific Plan (Lifetime) No   4. Active Suicidal Ideation with Some Intent to Act, Without Specific Plan (Recent) No   RETIRE: 5. Active Suicidal Ideation with Specific Plan and Intent (Lifetime) No   RETIRED: 5. Active Suicidal Ideation with Specific Plan and Intent (Recent) No   Actual Attempt (Lifetime) No   Actual Attempt  (Past 3 Months) No   Has subject engaged in non-suicidal self-injurious behavior? (Lifetime) No   Has subject engaged in non-suicidal self-injurious behavior? (Past 3 Months) No   Interrupted Attempts (Lifetime) No   Interrupted Attempts (Past 3 Months) No   Aborted or Self-Interrupted Attempt (Lifetime) No   Aborted or Self-Interrupted Attempt (Past 3 Months) No   Preparatory Acts or Behavior (Lifetime) No   Preparatory Acts or Behavior (Past 3 Months) No       ASSESSMENT: Current Emotional / Mental Status (status of significant symptoms):   Risk status (Self / Other harm or suicidal ideation)   Patient denies current fears or concerns for personal safety.   Patient denies current or recent suicidal ideation or behaviors.   Patient denies current or recent homicidal ideation or behaviors.   Patient denies current or recent self injurious behavior or ideation.   Patient denies other safety concerns.   Patient reports there has been no change in risk factors since their last session.     Patient reports there has been no change in protective factors since their last session.     Recommended that patient call 911 or go to the local ED should there be a change in any of these risk factors.     Appearance:   Appropriate    Eye Contact:   Good    Psychomotor Behavior: Normal    Attitude:   Cooperative    Orientation:   Person Place Time Situation   Speech    Rate / Production: Normal     Volume:  Normal    Mood:    Normal   Affect:    Appropriate    Thought Content:  Clear    Thought Form:  Coherent  Logical    Insight:    Good      Medication Review:   No changes to current psychiatric medication(s)     Medication Compliance:   Yes     Changes in Health Issues:   None reported     Chemical Use Review:   Substance Use: Chemical use reviewed, no active concerns identified      Tobacco Use: No current tobacco use.      Diagnosis:  1. Generalized anxiety disorder      Collateral Reports Completed:   Routed note to  PCP    PLAN: (Patient Tasks / Therapist Tasks / Other):  Patient will use her safety plan as needed.   Patient will review the CBT skills sent via [employer]  Patient will reflect on today's visit and made a sounding decision: Will  continue to define Happiness in her own terms.   Patient will go hiking this weekend  Patient next visit is on  6/05/2023    Francisco Smith, LICSW                                                ________________________________________________________________    Individual Treatment Plan    Patient's Name: Iker Edge  YOB: 2000    Date of Creation: 1/03/2023    Date Treatment Plan Last Reviewed/Revised:5/04/2023    DSM5 Diagnoses: 296.32 (F33.1) Major Depressive Disorder, Recurrent Episode, Moderate _ and With anxious distress or 300.00 (F41.9) Unspecified Anxiety Disorder     Psychosocial / Contextual Factors: Long standing history of depression since age 12-13. Can not pin point what was happening around that age.ongoing SI.     PROMIS (reviewed every 90 days):    Referral / Collaboration:  Referral to another professional/service is not indicated at this time..    Anticipated number of session for this episode of care: 9-12 sessions  Anticipation frequency of session: Biweekly  Anticipated Duration of each session: 53 or more minutes  Treatment plan will be reviewed in 90 days or when goals have been changed.     MeasurableTreatment Goal(s) related to diagnosis / functional impairment(s)  Goal 1: Patient will keep her depression under control in order to be productive      I will know I've met my goal when the level of depression of 3/4 is reduced  to 2/4 or better by the next review.       Objective #A (Patient Action)                           Status: Continued - Date: 5/04/2023   Patient will Improve concentration, focus, and mindfulness in daily activities   Intervention(s)  Therapist will assign homework : practice CBT skills to challenge any distortions and  incease confidence.   teach Healthy life styles: sleep, balance diet, exercise and personal hygiene.      Objective #B  Patient will use at least 3 coping skills for depression management in the next 12 weeks   Status:update - Date: 5/04/2023  Intervention(s)  Therapist will assign homework : make a list of triggers and signs and discuss in the session for input.     Goal 2: Patient will develop healthy cognitive patterns and beliefs about self and the world that lead to alleviation and help prevent the relapse of depression symptoms.     I will know I've met my goal when my level of my depression is reduced from 3/4 to 2/4 or better by the next review.       Objective #A (Patient Action)                          Patient will identify at least 4 stressors which contribute to feelings of depression.  Status: update - Date: 5/04/2023     Intervention(s)  Therapist will teach distraction skills. including seff soothing with the 5 senses.     Objective #B  Patient will Identify negative self-talk and behaviors: challenge core beliefs, myths, and actions.  Status: update - Date: 5/04/2023  Intervention(s)  Therapist will Introduce CBT skills.     Objective #C  Patient will Increase interest, engagement, and pleasure in doing things.  Status:update - Date: 5/04/2023  Intervention(s)  Therapist will Encourage patient to share identify and share thoughts and feelings to increase self confidence .     Goal 3: Client will will stabilize anxiety level while increasing ability to function on a daily basis.     I will know I've met my goal when my anxiety is reduced from 4/4 to 3/4 or better by the next review       Objective #A (Client Action)                Client will Increase interest, engagement, and pleasure in doing things.  Status: update - Date: 5/04/2023     Intervention(s)  Therapist will provide educational materials on distraction activities.     Objective #B  Client will identify at least 4 fears / thoughts that  "contribute to feeling anxious.  Status: update - Date: 5/04/2023     Intervention(s)  Therapist will teach how to challenge negative thoughts using CBT skills including the 3 Cs.     Objective #C  Client will use thought-stopping strategy daily to reduce intrusive thoughts.  Status:update - Date: 5/04/2023     Intervention(s)              Therapist will Teach how to use CBT: 3 Cs to challenge the NTs as they   present.   Objective #D (Patient Action)                          Status: update - Date: 5/04/2023     Intervention(s)  Therapist will teach emotional recognition/identification. related to behavior change.     Objective #E  Patient will use thought-stopping strategy daily to reduce intrusive thoughts.  Status::  update - Date: 5/04/2023  Intervention(s)  Therapist will assist the patient Identify any distortions and emotions that affect patient's productivity.     Patient has reviewed and agreed to the above plan.      Francisco Smith, [facility]  5/04/2023         [facility]                                       Iker Edge-Now Brit Edge     SAFETY PLAN: Reviewed today 3/01/2023; 5/04/2023- Has not had SI for the last several weeks.  Step 1: Warning signs / cues (Thoughts, images, mood, situation, behavior) that a crisis may be developing:    Thoughts: \"I'm a burden\", \"I can't do this anymore\" and \"Nothing makes it better\"    Images: obsessive thoughts of death or dying: \" Suicide\", flashbacks and visions of harm: Cutting, burning self.    Thinking Processes: intrusive thoughts (bothersome, unwanted thoughts that come out of nowhere): things done in the past; memories of poor living situation for a year about 5-6 years ago. , highly critical and negative thoughts: \" I don't deserve it, I am not doing enough, not good enough\" and depersonalization    Mood: worsening depression, hopelessness, intense anger, agitation and mood swings    Behaviors: isolating/withdrawing , using alcohol, " "can't stop crying, aggression, not taking care of my responsibilities and increasing frequency and duration of dissociation    Situations: bullying: from co workers( current); schoolmates( past), public shame: how she perceives self and relationship problems   Step 2: Coping strategies - Things I can do to take my mind off of my problems without contacting another person (relaxation technique, physical activity):    Distress Tolerance Strategies:  read a book: Any and Writing; video games    Physical Activities: go for a walk, gardening and stretching     Focus on helpful thoughts:  \"This is temporary\", \"I will get through this\" and \"It always passes\"  Step 3: People and social settings that provide distraction:   Name: Jeremiah ( Partner) Phone:  [phone]   Name:  Emerita ( good friend) Phone:    [phone]    coffee shop and park   Step 4: Remind myself of people and things that are important to me and worth living for:  Partner  Step 5: When I am in crisis, I can ask these people to help me use my safety plan:  Name: Jeremiah ( Partner) Phone:  [phone]  Name:  Emerita ( good friend) Phone:    [phone]  Step 6: Making the environment safe:     remove alcohol and be around others  Step 7: Professionals or agencies I can contact during a crisis:    Suicide Prevention Lifeline: Call or Text 547   Local Crisis Services:  [facility] crisis line 1-295.972.9192.    Call 551 or go to my nearest emergency department.   I helped develop this safety plan and agree to use it when needed.  I have been given a copy of this plan.   Client signature _________________________________________________________________  Today s date:  1/3/2023  Completed by Provider Name/ Credentials: ANGELO Schaefer January 3, 2023  Adapted from Safety Plan Template 2008 Jazlyn Carreon and Bryan Weber is reprinted with the express permission of the authors.  No portion of the Safety Plan Template may be reproduced without the " express, written permission.  You can contact the authors at bhs@[email or web address] or juan [email or web address].  Answers for HPI/ROS submitted by the patient on 1/2/2023  If you checked off any problems, how difficult have these problems made it for you to do your work, take care of things at home, or get along with other people?: Somewhat difficult  PHQ9 TOTAL SCORE: 12  Answers for HPI/ROS submitted by the patient on 3/1/2023  If you checked off any problems, how difficult have these problems made it for you to do your work, take care of things at home, or get along with other people?: Not difficult at all  PHQ9 TOTAL SCORE: 12    Answers for HPI/ROS submitted by the patient on 4/4/2023  If you checked off any problems, how difficult have these problems made it for you to do your work, take care of things at home, or get along with other people?: Not difficult at all  PHQ9 TOTAL SCORE: 6    Answers for HPI/ROS submitted by the patient on 4/20/2023  If you checked off any problems, how difficult have these problems made it for you to do your work, take care of things at home, or get along with other people?: Not difficult at all  PHQ9 TOTAL SCORE: 2    Answers for HPI/ROS submitted by the patient on 5/3/2023  If you checked off any problems, how difficult have these problems made it for you to do your work, take care of things at home, or get along with other people?: Not difficult at all  PHQ9 TOTAL SCORE: 2

## 2023-05-18 NOTE — RESULT ENCOUNTER NOTE
Beatrice Edge,     It is a pleasure providing you with medical care. I have received and reviewed your lab results, and have the following recommendations:     Good news! Your estrogen levels are within ideal ranges for someone undergoing GAHT. Additionally it does reassure me that your potassium levels are less than 5 while being on spironolactone. We're still awaiting the results of your testosterone levels.     Sincerely,     Dai Gale MD

## 2023-05-19 LAB — TESTOST SERPL-MCNC: 73 NG/DL (ref 8–950)

## 2023-05-22 ENCOUNTER — VIRTUAL VISIT (OUTPATIENT)
Dept: FAMILY MEDICINE | Facility: CLINIC | Age: 23
End: 2023-05-22
Payer: COMMERCIAL

## 2023-05-22 DIAGNOSIS — F64.9 GENDER DYSPHORIA: ICD-10-CM

## 2023-05-22 DIAGNOSIS — F33.1 MODERATE RECURRENT MAJOR DEPRESSION (H): ICD-10-CM

## 2023-05-22 PROCEDURE — 99214 OFFICE O/P EST MOD 30 MIN: CPT | Mod: VID | Performed by: STUDENT IN AN ORGANIZED HEALTH CARE EDUCATION/TRAINING PROGRAM

## 2023-05-22 RX ORDER — SPIRONOLACTONE 100 MG/1
150 TABLET, FILM COATED ORAL 2 TIMES DAILY
Qty: 270 TABLET | Refills: 3 | Status: SHIPPED | OUTPATIENT
Start: 2023-05-22 | End: 2024-03-26

## 2023-05-22 RX ORDER — ESTRADIOL 2 MG/1
2 TABLET ORAL 2 TIMES DAILY
Qty: 180 TABLET | Refills: 3 | Status: SHIPPED | OUTPATIENT
Start: 2023-05-22 | End: 2024-03-26

## 2023-05-22 ASSESSMENT — ANXIETY QUESTIONNAIRES
GAD7 TOTAL SCORE: 1
1. FEELING NERVOUS, ANXIOUS, OR ON EDGE: NOT AT ALL
IF YOU CHECKED OFF ANY PROBLEMS ON THIS QUESTIONNAIRE, HOW DIFFICULT HAVE THESE PROBLEMS MADE IT FOR YOU TO DO YOUR WORK, TAKE CARE OF THINGS AT HOME, OR GET ALONG WITH OTHER PEOPLE: NOT DIFFICULT AT ALL
7. FEELING AFRAID AS IF SOMETHING AWFUL MIGHT HAPPEN: NOT AT ALL
5. BEING SO RESTLESS THAT IT IS HARD TO SIT STILL: SEVERAL DAYS
6. BECOMING EASILY ANNOYED OR IRRITABLE: NOT AT ALL
GAD7 TOTAL SCORE: 1
2. NOT BEING ABLE TO STOP OR CONTROL WORRYING: NOT AT ALL
3. WORRYING TOO MUCH ABOUT DIFFERENT THINGS: NOT AT ALL

## 2023-05-22 ASSESSMENT — PATIENT HEALTH QUESTIONNAIRE - PHQ9
5. POOR APPETITE OR OVEREATING: NOT AT ALL
SUM OF ALL RESPONSES TO PHQ QUESTIONS 1-9: 3

## 2023-05-22 NOTE — PROGRESS NOTES
Brit is a 23 year old who is being evaluated via a billable video visit.      How would you like to obtain your AVS? MyChart  If the video visit is dropped, the invitation should be resent by: Text to cell phone: [phone]  Will anyone else be joining your video visit? No      1. Moderate recurrent major depression (H), improved   > improved from prior   - patient reports after changing her job, her stress levels have decreased and her mood has improved   - sertraline (ZOLOFT) 50 MG tablet; Take 1 tablet (50 mg) by mouth daily  Dispense: 90 tablet; Refill: 3    2. Gender dysphoria: non-binary gender identity  > testosterone values were above 55, previous labs were all below that value  > patient admits that her dysphoria is controlled   - patient is already on 300mg daily of aldactone will try to avoid increasing this dose,   - will have her provide lab work again in 1 month to recheck her testosterone if at that time it is still high can reassess her dysphoria, if her dysphoria is well controlled we can continue to monitor despite the mildly elevated Testerone, if her dysphoria is worse can consider alternative antiandrogen therapies if clinically indicated   - refilled spironolactone (ALDACTONE) 100 MG tablet; Take 1.5 tablets (150 mg) by mouth 2 times daily  Dispense: 270 tablet; Refill: 3  - refilled estradiol (ESTRACE) 2 MG tablet; Take 1 tablet (2 mg) by mouth 2 times daily  Dispense: 180 tablet; Refill: 3  - will recheck Testosterone, total; Future in 1 month to reassess values       Subjective   Brit is a 23 year old, presenting for the following health issues:  Results (Transgender care, lab follow up), Anxiety, Depression, and Recheck Medication        5/22/2023     9:44 AM   Additional Questions   Roomed by Crystal GONZALEZ LPN   Accompanied by self         5/22/2023     9:44 AM   Patient Reported Additional Medications   Patient reports taking the following new medications Fiber supplement      HPI      Depression and Anxiety Follow-Up    How are you doing with your depression since your last visit? Improved     How are you doing with your anxiety since your last visit?  Improved     Are you having other symptoms that might be associated with depression or anxiety? Yes:  some trouble sleeping - mostly trouble staying asleep    Have you had a significant life event? Housing Concerns and OTHER: moving up to [place]     Do you have any concerns with your use of alcohol or other drugs? No    Social History     Tobacco Use     Smoking status: Never     Smokeless tobacco: Never     Tobacco comments:     no tobacco exposure   Vaping Use     Vaping status: Never Used   Substance Use Topics     Alcohol use: Yes     Comment: 1-2 weekly     Drug use: No         4/20/2023    10:22 AM 5/3/2023     2:49 PM 5/22/2023     3:55 PM   PHQ   PHQ-9 Total Score 2 2 3   Q9: Thoughts of better off dead/self-harm past 2 weeks Not at all Not at all Not at all         1/25/2023    10:09 AM 4/4/2023     2:03 PM 5/22/2023     3:55 PM   LISA-7 SCORE   Total Score 5 (mild anxiety)     Total Score 5 5 1         5/22/2023     3:55 PM   Last PHQ-9   1.  Little interest or pleasure in doing things 1   2.  Feeling down, depressed, or hopeless 0   3.  Trouble falling or staying asleep, or sleeping too much 1   4.  Feeling tired or having little energy 0   5.  Poor appetite or overeating 1   6.  Feeling bad about yourself 0   7.  Trouble concentrating 0   8.  Moving slowly or restless 0   Q9: Thoughts of better off dead/self-harm past 2 weeks 0   PHQ-9 Total Score 3   Difficulty at work, home, or with people Not difficult at all         5/22/2023     3:55 PM   LISA-7    1. Feeling nervous, anxious, or on edge 0   2. Not being able to stop or control worrying 0   3. Worrying too much about different things 0   4. Trouble relaxing 0   5. Being so restless that it is hard to sit still 1   6. Becoming easily annoyed or irritable 0   7. Feeling afraid, as  if something awful might happen 0   LISA-7 Total Score 1   If you checked any problems, how difficult have they made it for you to do your work, take care of things at home, or get along with other people? Not difficult at all       Suicide Assessment Five-step Evaluation and Treatment (SAFE-T)      How many servings of fruits and vegetables do you eat daily?  4 or more    On average, how many sweetened beverages do you drink each day (Examples: soda, juice, sweet tea, etc.  Do NOT count diet or artificially sweetened beverages)?   0    How many days per week do you exercise enough to make your heart beat faster? 3 or less    How many minutes a day do you exercise enough to make your heart beat faster? 9 or less    How many days per week do you miss taking your medication? 0    Medication Followup of Estradiol 2mg    Taking Medication as prescribed: yes    Side Effects:  None    Medication Helping Symptoms:  yes    Medication Followup of Spironolactone 100mg    Taking Medication as prescribed: yes    Side Effects:  None    Medication Helping Symptoms:  yes    Medication Followup of Sertraline 50mg    Taking Medication as prescribed: yes    Side Effects:  None    Medication Helping Symptoms:  yes   '    Review of Systems   As above       Objective           Vitals:  No vitals were obtained today due to virtual visit.    Physical Exam   GENERAL: Healthy, alert and no distress  EYES: Eyes grossly normal to inspection.  No discharge or erythema, or obvious scleral/conjunctival abnormalities.  RESP: No audible wheeze, cough, or visible cyanosis.  No visible retractions or increased work of breathing.    SKIN: Visible skin clear. No significant rash, abnormal pigmentation or lesions.  NEURO: Cranial nerves grossly intact.  Mentation and speech appropriate for age.  PSYCH: Mentation appears normal, affect normal/bright, judgement and insight intact, normal speech and appearance well-groomed.          Video-Visit  Details    Type of service:  Video Visit duration was 10 min and 59 seconds     Originating Location (pt. Location): Home    Distant Location (provider location):  On-site  Platform used for Video Visit: Edy

## 2023-05-22 NOTE — RESULT ENCOUNTER NOTE
Patient has appointment with me scheduled tomorrow, can discuss testosterone levels then.     Dai Gale MD

## 2023-06-27 ASSESSMENT — PATIENT HEALTH QUESTIONNAIRE - PHQ9
SUM OF ALL RESPONSES TO PHQ QUESTIONS 1-9: 2
10. IF YOU CHECKED OFF ANY PROBLEMS, HOW DIFFICULT HAVE THESE PROBLEMS MADE IT FOR YOU TO DO YOUR WORK, TAKE CARE OF THINGS AT HOME, OR GET ALONG WITH OTHER PEOPLE: NOT DIFFICULT AT ALL
SUM OF ALL RESPONSES TO PHQ QUESTIONS 1-9: 2

## 2023-06-28 ENCOUNTER — VIRTUAL VISIT (OUTPATIENT)
Dept: PSYCHOLOGY | Facility: CLINIC | Age: 23
End: 2023-06-28
Payer: COMMERCIAL

## 2023-06-28 DIAGNOSIS — F33.0 MDD (MAJOR DEPRESSIVE DISORDER), RECURRENT EPISODE, MILD (H): Primary | ICD-10-CM

## 2023-06-28 PROCEDURE — 90837 PSYTX W PT 60 MINUTES: CPT | Mod: VID | Performed by: SOCIAL WORKER

## 2023-06-28 NOTE — PROGRESS NOTES
[facility]                                      Progress Note    Patient Name: Iker Edge  Date: 6/28/2023      Service Type: Individual      Session Start Time: 8:02 Session End Time:8:56     Session Length: 54    Session #:8    Attendees: Client    Service Modality:  Video Visit:      Provider verified identity through the following two step process.  Patient provided:  Patient is known previously to provider    Telemedicine Visit: The patient's condition can be safely assessed and treated via synchronous audio and visual telemedicine encounter.      Reason for Telemedicine Visit: Services only offered telehealth    Originating Site (Patient Location): Patient's home    Distant Site (Provider Location): Provider Remote Setting    Consent:  The patient/guardian has verbally consented to: the potential risks and benefits of telemedicine (video visit) versus in person care; bill my insurance or make self-payment for services provided; and responsibility for payment of non-covered services.     Patient would like the video invitation sent by:  My Chart    Mode of Communication:  Video Conference via [employer]    Distant Location (Provider):  Off-site    As the provider I attest to compliance with applicable laws and regulations related to telemedicine.    DATA  Interactive Complexity: Yes, visit entailed Interactive Complexity evidenced by: over whelmed but different issues( family work and preparation for college). Wanted to process thoughts produced by these issues.      Crisis: No      Progress Since Last Session (Related to Symptoms / Goals / Homework):   Symptoms: Some improvement. no SI this week    Homework: Achieved / completed to satisfaction      Episode of Care Goals: Satisfactory progress - ACTION (Actively working towards change); Intervened by reinforcing change plan / affirming steps taken    Current / Ongoing Stressors and Concerns: Patient came in prepared to address issues  that have been bothering her. Work is good but she hears a lot about previous employee from her boss and feels it is preventing her from doing her job.Not sure how or whether to address this. Doing well with her college preparation but has noted lack of communication between departments which makes do extra work. Notes some lack of responsibility on father and worries about mom. Not sure how to deal with this. Patient noted the need to continue working on her confidence level and focus on her own well being. Will not delay to express herself as long as what she is about to say meets : THINK. She notes expressing herself has been a struggle in her life. Will remember that it is not what is being said but how it is being said. Her next visit is in 2 weeks.      Treatment Objective(s) Addressed in This Session:   Identify negative self-talk and behaviors: challenge core beliefs, myths, and actions     Intervention: Reviewed her TP today. Making progress using the 3 CS.     Situation        Automatic Thoughts  Cognitive Distortions      Feelings        Behavior        Questioning Thoughts          MI Intervention: Expressed Empathy/Understanding, Supported Autonomy, Collaboration, Evocation, Permission to raise concern or advise and Open-ended questions     Change Talk Expressed by the Patient: Taking steps    Provider Response to Change Talk: E - Evoked more info from patient about behavior change, A - Affirmed patient's thoughts, decisions, or attempts at behavior change, R - Reflected patient's change talk and S - Summarized patient's change talk statements    Assessments completed prior to visit: 12/07/2022    The following assessments were completed by patient for this visit:  PHQ2:       1/24/2023     8:21 AM 8/30/2019     7:43 AM 4/25/2018    11:45 AM 6/16/2016    10:08 AM 6/9/2014     4:15 PM   PHQ-2 ( 1999 [employer])   Q1: Little interest or pleasure in doing things 1 3 0 0 0   Q2: Feeling down, depressed or  hopeless 2 2 0 0 0   PHQ-2 Score 3 5 0 0 0   PHQ-2 Total Score (12-17 Years)- Positive if 3 or more points; Administer PHQ-A if positive  5      Q1: Little interest or pleasure in doing things Several days       Q2: Feeling down, depressed or hopeless More than half the days       PHQ-2 Score 3         PHQ9:       1/24/2023     8:21 AM 3/1/2023     7:54 AM 4/4/2023     6:55 AM 4/20/2023    10:22 AM 5/3/2023     2:49 PM 5/22/2023     3:55 PM 6/27/2023     8:10 PM   PHQ-9 SCORE   PHQ-9 Total Score MyChart 9 (Mild depression) 12 (Moderate depression) 6 (Mild depression) 2 (Minimal depression) 2 (Minimal depression)  2 (Minimal depression)   PHQ-9 Total Score 9 12 6 2 2 3 2     GAD2:       11/29/2022    10:24 AM 1/2/2023     2:48 PM 3/1/2023     7:55 AM 4/4/2023     6:55 AM 4/20/2023    10:23 AM 5/3/2023     2:50 PM 6/27/2023     8:10 PM   LISA-2   Feeling nervous, anxious, or on edge 1 1 1 1 0 1 0   Not being able to stop or control worrying 1 0 0 1 0 0 0   LISA-2 Total Score 2 1 1 2 0 1 0     GAD7:       4/26/2021     3:20 PM 11/8/2021     4:24 PM 5/16/2022     4:27 PM 11/28/2022     2:10 PM 1/25/2023    10:09 AM 4/4/2023     2:03 PM 5/22/2023     3:55 PM   LISA-7 SCORE   Total Score     5 (mild anxiety)     Total Score 11 7 5 7 5 5 1     CAGE-AID:       11/29/2022    10:28 AM 4/20/2023     2:06 PM 5/4/2023    12:08 PM   CAGE-AID Total Score   Total Score 2 0 0   Total Score MyChart 2 (A total score of 2 or greater is considered clinically significant)       [place] Suicide Severity Rating Scale (Lifetime/Recent)      10/31/2019     9:03 AM   [place] Suicide Severity Rating (Lifetime/Recent)   Wish to be Dead (Lifetime) Yes   Non-Specific Active Suicidal Thoughts (Lifetime) Yes   RETIRED: 1. Wish to be Dead (Recent) Yes   RETIRED: 2. Non-Specific Active Suicidal Thoughts (Recent) Yes   3. Active Suicidal Ideation with any Methods (Not Plan) Without Intent to Act (Lifetime) No   RETIRED: 3. Active Suicidal Ideation with  any Methods (Not Plan) Without Intent to Act (Recent) No   RETIRE: 4. Active Suicidal Ideation with Some Intent to Act, Without Specific Plan (Lifetime) No   4. Active Suicidal Ideation with Some Intent to Act, Without Specific Plan (Recent) No   RETIRE: 5. Active Suicidal Ideation with Specific Plan and Intent (Lifetime) No   RETIRED: 5. Active Suicidal Ideation with Specific Plan and Intent (Recent) No   Actual Attempt (Lifetime) No   Actual Attempt (Past 3 Months) No   Has subject engaged in non-suicidal self-injurious behavior? (Lifetime) No   Has subject engaged in non-suicidal self-injurious behavior? (Past 3 Months) No   Interrupted Attempts (Lifetime) No   Interrupted Attempts (Past 3 Months) No   Aborted or Self-Interrupted Attempt (Lifetime) No   Aborted or Self-Interrupted Attempt (Past 3 Months) No   Preparatory Acts or Behavior (Lifetime) No   Preparatory Acts or Behavior (Past 3 Months) No       ASSESSMENT: Current Emotional / Mental Status (status of significant symptoms):   Risk status (Self / Other harm or suicidal ideation)   Patient denies current fears or concerns for personal safety.   Patient denies current or recent suicidal ideation or behaviors.   Patient denies current or recent homicidal ideation or behaviors.   Patient denies current or recent self injurious behavior or ideation.   Patient denies other safety concerns.   Patient reports there has been no change in risk factors since their last session.     Patient reports there has been no change in protective factors since their last session.     Recommended that patient call 911 or go to the local ED should there be a change in any of these risk factors.     Appearance:   Appropriate    Eye Contact:   Good    Psychomotor Behavior: Normal    Attitude:   Cooperative    Orientation:   Person Place Time Situation   Speech    Rate / Production: Normal     Volume:  Normal    Mood:    Normal   Affect:    Appropriate    Thought Content:  Clear     Thought Form:  Coherent  Logical    Insight:    Good      Medication Review:   No changes to current psychiatric medication(s)     Medication Compliance:   Yes     Changes in Health Issues:   None reported     Chemical Use Review:   Substance Use: Chemical use reviewed, no active concerns identified      Tobacco Use: No current tobacco use.      Diagnosis:  1. MDD (major depressive disorder), recurrent episode, mild (H)      Collateral Reports Completed:   Routed note to PCP    PLAN: (Patient Tasks / Therapist Tasks / Other):  Patient will use her safety plan as needed.   Patient will review the CBT skills sent via [employer]  Patient will reflect on today's visit and made a sounding decision: Will  continue to define Happiness in her own terms.   Patient will keep up using tips discussed to increase confidence.   Patient next visit is on  7/06/2023    ANGELO Schaefer                                                ________________________________________________________________    Individual Treatment Plan    Patient's Name: Iker Edge  YOB: 2000    Date of Creation: 1/03/2023    Date Treatment Plan Last Reviewed/Revised:5/04/2023    DSM5 Diagnoses: 296.32 (F33.1) Major Depressive Disorder, Recurrent Episode, Moderate _ and With anxious distress or 300.00 (F41.9) Unspecified Anxiety Disorder     Psychosocial / Contextual Factors: Long standing history of depression since age 12-13. Can not pin point what was happening around that age.ongoing SI.     PROMIS (reviewed every 90 days):    Referral / Collaboration:  Referral to another professional/service is not indicated at this time..    Anticipated number of session for this episode of care: 9-12 sessions  Anticipation frequency of session: Biweekly  Anticipated Duration of each session: 53 or more minutes  Treatment plan will be reviewed in 90 days or when goals have been changed.     MeasurableTreatment Goal(s) related to diagnosis /  functional impairment(s)  Goal 1: Patient will keep her depression under control in order to be productive      I will know I've met my goal when the level of depression of 3/4 is reduced to 2/4 or better by the next review.       Objective #A (Patient Action)                           Status: Continued - Date: 5/04/2023   Patient will Improve concentration, focus, and mindfulness in daily activities     Intervention(s)  Therapist will assign homework : practice CBT skills to challenge any distortions and incease confidence.   teach Healthy life styles: sleep, balance diet, exercise and personal hygiene.      Objective #B  Patient will use at least 3 coping skills for depression management in the next 12 weeks   Status:update - Date: 5/04/2023  Intervention(s)  Therapist will assign homework : make a list of triggers and signs and discuss in the session for input.     Goal 2: Patient will develop healthy cognitive patterns and beliefs about self and the world that lead to alleviation and help prevent the relapse of depression symptoms.     I will know I've met my goal when my level of my depression is reduced from 3/4 to 2/4 or better by the next review.       Objective #A (Patient Action)                          Patient will identify at least 4 stressors which contribute to feelings of depression.  Status: update - Date: 5/04/2023     Intervention(s)  Therapist will teach distraction skills. including seff soothing with the 5 senses.     Objective #B  Patient will Identify negative self-talk and behaviors: challenge core beliefs, myths, and actions.  Status: update - Date: 5/04/2023  Intervention(s)  Therapist will Introduce CBT skills.     Objective #C  Patient will Increase interest, engagement, and pleasure in doing things.  Status:update - Date: 5/04/2023  Intervention(s)  Therapist will Encourage patient to share identify and share thoughts and feelings to increase self confidence .     Goal 3: Client will  "will stabilize anxiety level while increasing ability to function on a daily basis.     I will know I've met my goal when my anxiety is reduced from 4/4 to 3/4 or better by the next review       Objective #A (Client Action)                Client will Increase interest, engagement, and pleasure in doing things.  Status: update - Date: 5/04/2023     Intervention(s)  Therapist will provide educational materials on distraction activities.     Objective #B  Client will identify at least 4 fears / thoughts that contribute to feeling anxious.  Status: update - Date: 5/04/2023     Intervention(s)  Therapist will teach how to challenge negative thoughts using CBT skills including the 3 Cs.     Objective #C  Client will use thought-stopping strategy daily to reduce intrusive thoughts.  Status:update - Date: 5/04/2023     Intervention(s)              Therapist will Teach how to use CBT: 3 Cs to challenge the NTs as they   present.   Objective #D (Patient Action)                          Status: update - Date: 5/04/2023     Intervention(s)  Therapist will teach emotional recognition/identification. related to behavior change.     Objective #E  Patient will use thought-stopping strategy daily to reduce intrusive thoughts.  Status::  update - Date: 5/04/2023  Intervention(s)  Therapist will assist the patient Identify any distortions and emotions that affect patient's productivity.     Patient has reviewed and agreed to the above plan.      [facility]  5/04/2023         [facility]                                       Iker Edge-Now Brit Edge     SAFETY PLAN: Reviewed today 3/01/2023; 5/04/2023- Has not had SI for the last several weeks.  Step 1: Warning signs / cues (Thoughts, images, mood, situation, behavior) that a crisis may be developing:    Thoughts: \"I'm a burden\", \"I can't do this anymore\" and \"Nothing makes it better\"    Images: obsessive thoughts of death or dying: \" Suicide\", " "flashbacks and visions of harm: Cutting, burning self.    Thinking Processes: intrusive thoughts (bothersome, unwanted thoughts that come out of nowhere): things done in the past; memories of poor living situation for a year about 5-6 years ago. , highly critical and negative thoughts: \" I don't deserve it, I am not doing enough, not good enough\" and depersonalization    Mood: worsening depression, hopelessness, intense anger, agitation and mood swings    Behaviors: isolating/withdrawing , using alcohol, can't stop crying, aggression, not taking care of my responsibilities and increasing frequency and duration of dissociation    Situations: bullying: from co workers( current); schoolmates( past), public shame: how she perceives self and relationship problems   Step 2: Coping strategies - Things I can do to take my mind off of my problems without contacting another person (relaxation technique, physical activity):    Distress Tolerance Strategies:  read a book: Any and Writing; video games    Physical Activities: go for a walk, gardening and stretching     Focus on helpful thoughts:  \"This is temporary\", \"I will get through this\" and \"It always passes\"  Step 3: People and social settings that provide distraction:   Name: Jeremiah ( Partner) Phone:  [phone]   Name:  Emerita ( good friend) Phone:    [phone]    coffee shop and park   Step 4: Remind myself of people and things that are important to me and worth living for:  Partner  Step 5: When I am in crisis, I can ask these people to help me use my safety plan:  Name: Jeremiah ( Partner) Phone:  [phone]  Name:  Emerita ( good friend) Phone:    [phone]  Step 6: Making the environment safe:     remove alcohol and be around others  Step 7: Professionals or agencies I can contact during a crisis:    Suicide Prevention Lifeline: Call or Text 612   Local Crisis Services:  [facility] crisis line 1-670.816.4908.    Call 911 or go to my nearest emergency " department.   I helped develop this safety plan and agree to use it when needed.  I have been given a copy of this plan.   Client signature _________________________________________________________________  Today s date:  1/3/2023  Completed by Provider Name/ Credentials: ANGELO Schaefer January 3, 2023  Adapted from Safety Plan Template 2008 Jazlyn Carreon and Bryan Weber is reprinted with the express permission of the authors.  No portion of the Safety Plan Template may be reproduced without the express, written permission.  You can contact the authors at [email or web address] or juan [email or web address].  Answers for HPI/ROS submitted by the patient on 1/2/2023  If you checked off any problems, how difficult have these problems made it for you to do your work, take care of things at home, or get along with other people?: Somewhat difficult  PHQ9 TOTAL SCORE: 12  Answers for HPI/ROS submitted by the patient on 3/1/2023  If you checked off any problems, how difficult have these problems made it for you to do your work, take care of things at home, or get along with other people?: Not difficult at all  PHQ9 TOTAL SCORE: 12    Answers for HPI/ROS submitted by the patient on 4/4/2023  If you checked off any problems, how difficult have these problems made it for you to do your work, take care of things at home, or get along with other people?: Not difficult at all  PHQ9 TOTAL SCORE: 6    Answers for HPI/ROS submitted by the patient on 4/20/2023  If you checked off any problems, how difficult have these problems made it for you to do your work, take care of things at home, or get along with other people?: Not difficult at all  PHQ9 TOTAL SCORE: 2    Answers for HPI/ROS submitted by the patient on 5/3/2023  If you checked off any problems, how difficult have these problems made it for you to do your work, take care of things at home, or get along with other people?: Not difficult at all  PHQ9 TOTAL SCORE:  2  Answers for HPI/ROS submitted by the patient on 6/27/2023  If you checked off any problems, how difficult have these problems made it for you to do your work, take care of things at home, or get along with other people?: Not difficult at all  PHQ9 TOTAL SCORE: 2

## 2023-07-06 ENCOUNTER — LAB (OUTPATIENT)
Dept: LAB | Facility: CLINIC | Age: 23
End: 2023-07-06
Payer: COMMERCIAL

## 2023-07-06 DIAGNOSIS — F64.9 GENDER DYSPHORIA: ICD-10-CM

## 2023-07-06 PROCEDURE — 84403 ASSAY OF TOTAL TESTOSTERONE: CPT

## 2023-07-06 PROCEDURE — 36415 COLL VENOUS BLD VENIPUNCTURE: CPT

## 2023-07-10 ENCOUNTER — VIRTUAL VISIT (OUTPATIENT)
Dept: PSYCHOLOGY | Facility: CLINIC | Age: 23
End: 2023-07-10
Payer: COMMERCIAL

## 2023-07-10 DIAGNOSIS — F41.1 GENERALIZED ANXIETY DISORDER: Primary | ICD-10-CM

## 2023-07-10 PROCEDURE — 90837 PSYTX W PT 60 MINUTES: CPT | Mod: VID | Performed by: SOCIAL WORKER

## 2023-07-10 ASSESSMENT — PATIENT HEALTH QUESTIONNAIRE - PHQ9
SUM OF ALL RESPONSES TO PHQ QUESTIONS 1-9: 3
10. IF YOU CHECKED OFF ANY PROBLEMS, HOW DIFFICULT HAVE THESE PROBLEMS MADE IT FOR YOU TO DO YOUR WORK, TAKE CARE OF THINGS AT HOME, OR GET ALONG WITH OTHER PEOPLE: NOT DIFFICULT AT ALL
SUM OF ALL RESPONSES TO PHQ QUESTIONS 1-9: 3

## 2023-07-10 NOTE — PROGRESS NOTES
[facility]                                      Progress Note    Patient Name: Iker Edge  Date: 7/10/2023      Service Type: Individual      Session Start Time: 13:03 Session End Time:13:56     Session Length: 53    Session #:9    Attendees: Client    Service Modality:  Video Visit:      Provider verified identity through the following two step process.  Patient provided:  Patient is known previously to provider    Telemedicine Visit: The patient's condition can be safely assessed and treated via synchronous audio and visual telemedicine encounter.      Reason for Telemedicine Visit: Services only offered telehealth    Originating Site (Patient Location): Patient's home    Distant Site (Provider Location): Provider Remote Setting    Consent:  The patient/guardian has verbally consented to: the potential risks and benefits of telemedicine (video visit) versus in person care; bill my insurance or make self-payment for services provided; and responsibility for payment of non-covered services.     Patient would like the video invitation sent by:  My Chart    Mode of Communication:  Video Conference via [employer]    Distant Location (Provider):  Off-site    As the provider I attest to compliance with applicable laws and regulations related to telemedicine.    DATA  Interactive Complexity: Yes, visit entailed Interactive Complexity evidenced by: over whelmed but different issues( family work and preparation for college). Wanted to process thoughts produced by these issues.      Crisis: No      Progress Since Last Session (Related to Symptoms / Goals / Homework):   Symptoms: Some improvement. no SI this week    Homework: Achieved / completed to satisfaction      Episode of Care Goals: Satisfactory progress - ACTION (Actively working towards change); Intervened by reinforcing change plan / affirming steps taken    Current / Ongoing Stressors and Concerns:Patient reports she has been doing well until  she experienced what appeared to be an anxiety attack. Could not pin point what was happening that led to anxiety. Seemed to be having a good time with her partner and others. She notes her partner who also is her protective factor has been supportive. She and writer discussed the tips to help when in school as she is not sure of how to handle such experience in the future. Overall patient reports she is doing well. Has figured out how to communicatewith her boss focusing on healthy boundaries. Has several weeks to go to school. Might check with her PCP about anxiety medications if necessary before the school starts.  Her next visit is in 2 weeks.     Treatment Objective(s) Addressed in This Session:   Identify negative self-talk and behaviors: challenge core beliefs, myths, and actions     Intervention: Reviewed her TP today. Making progress using the 3 CS.     Situation        Automatic Thoughts  Cognitive Distortions      Feelings        Behavior        Questioning Thoughts          MI Intervention: Expressed Empathy/Understanding, Supported Autonomy, Collaboration, Evocation, Permission to raise concern or advise and Open-ended questions     Change Talk Expressed by the Patient: Taking steps    Provider Response to Change Talk: E - Evoked more info from patient about behavior change, A - Affirmed patient's thoughts, decisions, or attempts at behavior change, R - Reflected patient's change talk and S - Summarized patient's change talk statements    Assessments completed prior to visit: 12/07/2022    The following assessments were completed by patient for this visit:  PHQ2:       1/24/2023     8:21 AM 8/30/2019     7:43 AM 4/25/2018    11:45 AM 6/16/2016    10:08 AM 6/9/2014     4:15 PM   PHQ-2 ( 1999 [employer])   Q1: Little interest or pleasure in doing things 1 3 0 0 0   Q2: Feeling down, depressed or hopeless 2 2 0 0 0   PHQ-2 Score 3 5 0 0 0   PHQ-2 Total Score (12-17 Years)- Positive if 3 or more points;  Administer PHQ-A if positive  5      Q1: Little interest or pleasure in doing things Several days       Q2: Feeling down, depressed or hopeless More than half the days       PHQ-2 Score 3         PHQ9:       3/1/2023     7:54 AM 4/4/2023     6:55 AM 4/20/2023    10:22 AM 5/3/2023     2:49 PM 5/22/2023     3:55 PM 6/27/2023     8:10 PM 7/10/2023    12:49 PM   PHQ-9 SCORE   PHQ-9 Total Score MyChart 12 (Moderate depression) 6 (Mild depression) 2 (Minimal depression) 2 (Minimal depression)  2 (Minimal depression) 3 (Minimal depression)   PHQ-9 Total Score 12 6 2 2 3 2 3     GAD2:       1/2/2023     2:48 PM 3/1/2023     7:55 AM 4/4/2023     6:55 AM 4/20/2023    10:23 AM 5/3/2023     2:50 PM 6/27/2023     8:10 PM 7/10/2023    12:50 PM   LISA-2   Feeling nervous, anxious, or on edge 1 1 1 0 1 0 0   Not being able to stop or control worrying 0 0 1 0 0 0 0   LISA-2 Total Score 1 1 2 0 1 0 0     GAD7:       4/26/2021     3:20 PM 11/8/2021     4:24 PM 5/16/2022     4:27 PM 11/28/2022     2:10 PM 1/25/2023    10:09 AM 4/4/2023     2:03 PM 5/22/2023     3:55 PM   LISA-7 SCORE   Total Score     5 (mild anxiety)     Total Score 11 7 5 7 5 5 1     CAGE-AID:       11/29/2022    10:28 AM 4/20/2023     2:06 PM 5/4/2023    12:08 PM   CAGE-AID Total Score   Total Score 2 0 0   Total Score MyChart 2 (A total score of 2 or greater is considered clinically significant)       [place] Suicide Severity Rating Scale (Lifetime/Recent)      10/31/2019     9:03 AM   [place] Suicide Severity Rating (Lifetime/Recent)   Wish to be Dead (Lifetime) Yes   Non-Specific Active Suicidal Thoughts (Lifetime) Yes   RETIRED: 1. Wish to be Dead (Recent) Yes   RETIRED: 2. Non-Specific Active Suicidal Thoughts (Recent) Yes   3. Active Suicidal Ideation with any Methods (Not Plan) Without Intent to Act (Lifetime) No   RETIRED: 3. Active Suicidal Ideation with any Methods (Not Plan) Without Intent to Act (Recent) No   RETIRE: 4. Active Suicidal Ideation with Some  Intent to Act, Without Specific Plan (Lifetime) No   4. Active Suicidal Ideation with Some Intent to Act, Without Specific Plan (Recent) No   RETIRE: 5. Active Suicidal Ideation with Specific Plan and Intent (Lifetime) No   RETIRED: 5. Active Suicidal Ideation with Specific Plan and Intent (Recent) No   Actual Attempt (Lifetime) No   Actual Attempt (Past 3 Months) No   Has subject engaged in non-suicidal self-injurious behavior? (Lifetime) No   Has subject engaged in non-suicidal self-injurious behavior? (Past 3 Months) No   Interrupted Attempts (Lifetime) No   Interrupted Attempts (Past 3 Months) No   Aborted or Self-Interrupted Attempt (Lifetime) No   Aborted or Self-Interrupted Attempt (Past 3 Months) No   Preparatory Acts or Behavior (Lifetime) No   Preparatory Acts or Behavior (Past 3 Months) No       ASSESSMENT: Current Emotional / Mental Status (status of significant symptoms):   Risk status (Self / Other harm or suicidal ideation)   Patient denies current fears or concerns for personal safety.   Patient denies current or recent suicidal ideation or behaviors.   Patient denies current or recent homicidal ideation or behaviors.   Patient denies current or recent self injurious behavior or ideation.   Patient denies other safety concerns.   Patient reports there has been no change in risk factors since their last session.     Patient reports there has been no change in protective factors since their last session.     Recommended that patient call 911 or go to the local ED should there be a change in any of these risk factors.     Appearance:   Appropriate    Eye Contact:   Good    Psychomotor Behavior: Normal    Attitude:   Cooperative    Orientation:   Person Place Time Situation   Speech    Rate / Production: Normal     Volume:  Normal    Mood:    Normal   Affect:    Appropriate    Thought Content:  Clear    Thought Form:  Coherent  Logical    Insight:    Good      Medication Review:   No changes to current  psychiatric medication(s)     Medication Compliance:   Yes     Changes in Health Issues:   None reported     Chemical Use Review:   Substance Use: Chemical use reviewed, no active concerns identified      Tobacco Use: No current tobacco use.      Diagnosis:  1. Generalized anxiety disorder      Collateral Reports Completed:   Routed note to PCP    PLAN: (Patient Tasks / Therapist Tasks / Other):  Patient will use her safety plan as needed.   Patient will review the CBT skills sent via [employer]  Patient will reflect on today's visit and made a sounding decision: Will  continue to define Happiness in her own terms.   Patient will keep up using tips discussed to increase confidence.   Patient will use the tips discussed to be able to control her anxiety  Patient next visit is on 7/24/2023    Francisco Smith LICSW                                                ________________________________________________________________    Individual Treatment Plan    Patient's Name: Iker Edge  YOB: 2000    Date of Creation: 1/03/2023    Date Treatment Plan Last Reviewed/Revised:5/04/2023    DSM5 Diagnoses: 296.32 (F33.1) Major Depressive Disorder, Recurrent Episode, Moderate _ and With anxious distress or 300.00 (F41.9) Unspecified Anxiety Disorder     Psychosocial / Contextual Factors: Long standing history of depression since age 12-13. Can not pin point what was happening around that age.ongoing SI.     PROMIS (reviewed every 90 days):    Referral / Collaboration:  Referral to another professional/service is not indicated at this time..    Anticipated number of session for this episode of care: 9-12 sessions  Anticipation frequency of session: Biweekly  Anticipated Duration of each session: 53 or more minutes  Treatment plan will be reviewed in 90 days or when goals have been changed.     MeasurableTreatment Goal(s) related to diagnosis / functional impairment(s)  Goal 1: Patient will keep her depression  under control in order to be productive      I will know I've met my goal when the level of depression of 3/4 is reduced to 2/4 or better by the next review.       Objective #A (Patient Action)                           Status: Continued - Date: 5/04/2023   Patient will Improve concentration, focus, and mindfulness in daily activities     Intervention(s)  Therapist will assign homework : practice CBT skills to challenge any distortions and incease confidence.   teach Healthy life styles: sleep, balance diet, exercise and personal hygiene.      Objective #B  Patient will use at least 3 coping skills for depression management in the next 12 weeks   Status:update - Date: 5/04/2023  Intervention(s)  Therapist will assign homework : make a list of triggers and signs and discuss in the session for input.     Goal 2: Patient will develop healthy cognitive patterns and beliefs about self and the world that lead to alleviation and help prevent the relapse of depression symptoms.     I will know I've met my goal when my level of my depression is reduced from 3/4 to 2/4 or better by the next review.       Objective #A (Patient Action)                          Patient will identify at least 4 stressors which contribute to feelings of depression.  Status: update - Date: 5/04/2023     Intervention(s)  Therapist will teach distraction skills. including seff soothing with the 5 senses.     Objective #B  Patient will Identify negative self-talk and behaviors: challenge core beliefs, myths, and actions.  Status: update - Date: 5/04/2023  Intervention(s)  Therapist will Introduce CBT skills.     Objective #C  Patient will Increase interest, engagement, and pleasure in doing things.  Status:update - Date: 5/04/2023  Intervention(s)  Therapist will Encourage patient to share identify and share thoughts and feelings to increase self confidence .     Goal 3: Client will will stabilize anxiety level while increasing ability to function on  "a daily basis.     I will know I've met my goal when my anxiety is reduced from 4/4 to 3/4 or better by the next review       Objective #A (Client Action)                Client will Increase interest, engagement, and pleasure in doing things.  Status: update - Date: 5/04/2023     Intervention(s)  Therapist will provide educational materials on distraction activities.     Objective #B  Client will identify at least 4 fears / thoughts that contribute to feeling anxious.  Status: update - Date: 5/04/2023     Intervention(s)  Therapist will teach how to challenge negative thoughts using CBT skills including the 3 Cs.     Objective #C  Client will use thought-stopping strategy daily to reduce intrusive thoughts.  Status:update - Date: 5/04/2023     Intervention(s)              Therapist will Teach how to use CBT: 3 Cs to challenge the NTs as they   present.   Objective #D (Patient Action)                          Status: update - Date: 5/04/2023     Intervention(s)  Therapist will teach emotional recognition/identification. related to behavior change.     Objective #E  Patient will use thought-stopping strategy daily to reduce intrusive thoughts.  Status::  update - Date: 5/04/2023  Intervention(s)  Therapist will assist the patient Identify any distortions and emotions that affect patient's productivity.     Patient has reviewed and agreed to the above plan.      Francisco Smith, Riverview Psychiatric CenterSW  5/04/2023         [facility]                                       Iker Edge-Now Brit Edge     SAFETY PLAN: Reviewed today 3/01/2023; 5/04/2023- Has not had SI for the last several weeks.  Step 1: Warning signs / cues (Thoughts, images, mood, situation, behavior) that a crisis may be developing:    Thoughts: \"I'm a burden\", \"I can't do this anymore\" and \"Nothing makes it better\"    Images: obsessive thoughts of death or dying: \" Suicide\", flashbacks and visions of harm: Cutting, burning self.    Thinking " "Processes: intrusive thoughts (bothersome, unwanted thoughts that come out of nowhere): things done in the past; memories of poor living situation for a year about 5-6 years ago. , highly critical and negative thoughts: \" I don't deserve it, I am not doing enough, not good enough\" and depersonalization    Mood: worsening depression, hopelessness, intense anger, agitation and mood swings    Behaviors: isolating/withdrawing , using alcohol, can't stop crying, aggression, not taking care of my responsibilities and increasing frequency and duration of dissociation    Situations: bullying: from co workers( current); schoolmates( past), public shame: how she perceives self and relationship problems   Step 2: Coping strategies - Things I can do to take my mind off of my problems without contacting another person (relaxation technique, physical activity):    Distress Tolerance Strategies:  read a book: Any and Writing; video games    Physical Activities: go for a walk, gardening and stretching     Focus on helpful thoughts:  \"This is temporary\", \"I will get through this\" and \"It always passes\"  Step 3: People and social settings that provide distraction:   Name: Jeremiah ( Partner) Phone:  [phone]   Name:  Emerita ( good friend) Phone:    [phone]    coffee shop and park   Step 4: Remind myself of people and things that are important to me and worth living for:  Partner  Step 5: When I am in crisis, I can ask these people to help me use my safety plan:  Name: Jeremiah ( Partner) Phone:  [phone]  Name:  Emerita ( good friend) Phone:    [phone]  Step 6: Making the environment safe:     remove alcohol and be around others  Step 7: Professionals or agencies I can contact during a crisis:    Suicide Prevention Lifeline: Call or Text 670   Local Crisis Services:  [facility] crisis line 1-652.719.2600.    Call 721 or go to my nearest emergency department.   I helped develop this safety plan and agree to use it when " needed.  I have been given a copy of this plan.   Client signature _________________________________________________________________  Today s date:  1/3/2023  Completed by Provider Name/ Credentials: ANGELO Schaefer January 3, 2023  Adapted from Safety Plan Template 2008 Jazlyn Carreon and Bryan Weber is reprinted with the express permission of the authors.  No portion of the Safety Plan Template may be reproduced without the express, written permission.  You can contact the authors at bhs@[email or web address] or juan a@[email or web address].  Answers for HPI/ROS submitted by the patient on 1/2/2023  If you checked off any problems, how difficult have these problems made it for you to do your work, take care of things at home, or get along with other people?: Somewhat difficult  PHQ9 TOTAL SCORE: 12  Answers for HPI/ROS submitted by the patient on 3/1/2023  If you checked off any problems, how difficult have these problems made it for you to do your work, take care of things at home, or get along with other people?: Not difficult at all  PHQ9 TOTAL SCORE: 12    Answers for HPI/ROS submitted by the patient on 4/4/2023  If you checked off any problems, how difficult have these problems made it for you to do your work, take care of things at home, or get along with other people?: Not difficult at all  PHQ9 TOTAL SCORE: 6    Answers for HPI/ROS submitted by the patient on 4/20/2023  If you checked off any problems, how difficult have these problems made it for you to do your work, take care of things at home, or get along with other people?: Not difficult at all  PHQ9 TOTAL SCORE: 2    Answers for HPI/ROS submitted by the patient on 5/3/2023  If you checked off any problems, how difficult have these problems made it for you to do your work, take care of things at home, or get along with other people?: Not difficult at all  PHQ9 TOTAL SCORE: 2  Answers for HPI/ROS submitted by the patient on 6/27/2023  If you  checked off any problems, how difficult have these problems made it for you to do your work, take care of things at home, or get along with other people?: Not difficult at all  PHQ9 TOTAL SCORE: 2    Answers for HPI/ROS submitted by the patient on 7/10/2023  If you checked off any problems, how difficult have these problems made it for you to do your work, take care of things at home, or get along with other people?: Not difficult at all  PHQ9 TOTAL SCORE: 3

## 2023-07-11 LAB — TESTOST SERPL-MCNC: 46 NG/DL (ref 8–950)

## 2023-07-24 ENCOUNTER — VIRTUAL VISIT (OUTPATIENT)
Dept: PSYCHOLOGY | Facility: CLINIC | Age: 23
End: 2023-07-24
Payer: COMMERCIAL

## 2023-07-24 DIAGNOSIS — F41.1 GENERALIZED ANXIETY DISORDER: Primary | ICD-10-CM

## 2023-07-24 PROCEDURE — 90837 PSYTX W PT 60 MINUTES: CPT | Mod: VID | Performed by: SOCIAL WORKER

## 2023-07-24 ASSESSMENT — ANXIETY QUESTIONNAIRES
1. FEELING NERVOUS, ANXIOUS, OR ON EDGE: SEVERAL DAYS
7. FEELING AFRAID AS IF SOMETHING AWFUL MIGHT HAPPEN: SEVERAL DAYS
GAD7 TOTAL SCORE: 7
2. NOT BEING ABLE TO STOP OR CONTROL WORRYING: SEVERAL DAYS
3. WORRYING TOO MUCH ABOUT DIFFERENT THINGS: SEVERAL DAYS
5. BEING SO RESTLESS THAT IT IS HARD TO SIT STILL: SEVERAL DAYS
GAD7 TOTAL SCORE: 7
IF YOU CHECKED OFF ANY PROBLEMS ON THIS QUESTIONNAIRE, HOW DIFFICULT HAVE THESE PROBLEMS MADE IT FOR YOU TO DO YOUR WORK, TAKE CARE OF THINGS AT HOME, OR GET ALONG WITH OTHER PEOPLE: SOMEWHAT DIFFICULT
6. BECOMING EASILY ANNOYED OR IRRITABLE: SEVERAL DAYS

## 2023-07-24 ASSESSMENT — PATIENT HEALTH QUESTIONNAIRE - PHQ9
SUM OF ALL RESPONSES TO PHQ QUESTIONS 1-9: 3
10. IF YOU CHECKED OFF ANY PROBLEMS, HOW DIFFICULT HAVE THESE PROBLEMS MADE IT FOR YOU TO DO YOUR WORK, TAKE CARE OF THINGS AT HOME, OR GET ALONG WITH OTHER PEOPLE: SOMEWHAT DIFFICULT
SUM OF ALL RESPONSES TO PHQ QUESTIONS 1-9: 3
5. POOR APPETITE OR OVEREATING: SEVERAL DAYS

## 2023-07-24 NOTE — PROGRESS NOTES
[facility]                                      Progress Note    Patient Name: Iker Edge  Date: 7/24/2023      Service Type: Individual      Session Start Time: 12:02     Session End Time:12:57     Session Length: 55    Session #:10    Attendees: Client    Service Modality:  Video Visit:      Provider verified identity through the following two step process.  Patient provided:  Patient is known previously to provider    Telemedicine Visit: The patient's condition can be safely assessed and treated via synchronous audio and visual telemedicine encounter.      Reason for Telemedicine Visit: Services only offered telehealth    Originating Site (Patient Location): Patient's home    Distant Site (Provider Location): Provider Remote Setting    Consent:  The patient/guardian has verbally consented to: the potential risks and benefits of telemedicine (video visit) versus in person care; bill my insurance or make self-payment for services provided; and responsibility for payment of non-covered services.     Patient would like the video invitation sent by:  My Chart    Mode of Communication:  Video Conference via [employer]    Distant Location (Provider):  Off-site    As the provider I attest to compliance with applicable laws and regulations related to telemedicine.    DATA  Interactive Complexity: Yes, visit entailed Interactive Complexity evidenced by: Over whelmed by the process to move out by the end of the week. Feels this has damaged her relationship. Wants to process this.      Crisis: No      Progress Since Last Session (Related to Symptoms / Goals / Homework):   Symptoms: Worsening anxiety related to the move    Homework: Partially completed      Episode of Care Goals: Satisfactory progress - ACTION (Actively working towards change); Intervened by reinforcing change plan / affirming steps taken    Current / Ongoing Stressors and Concerns:Patient reports high stress related to how things are  "going around her moving process. Has had some encounters with her partners as she notes things are are not moving fast enough to meet the deadline. They are to move out by the end of the month which then will allow them to move to [place] to start school. Has had similar issue at work when she does have difficulties to address what is not working properly. This gives her feelings of guilt, sadness, frustration and anger. She is aware that it is time to process her thoughts and learn how to deal with stressful situation. Patient will continue to work on herself in order to e able to handle external stressors. Will use to tips discussed like ' your partner won't have ability to read your mind\", \" it is not what you say, it is how you say it.\" Her next visit is in 2 weeks.       Treatment Objective(s) Addressed in This Session:   Identify negative self-talk and behaviors: challenge core beliefs, myths, and actions      Intervention: Reviewed her TP today. Making progress using the 3 CS.     Situation      Automatic Thoughts  Cognitive Distortions    Feelings      Behavior      Questioning Thoughts        MI Intervention:     Change Talk Expressed by the Patient: Taking steps    Provider Response to Change Talk: E - Evoked more info from patient about behavior change, A - Affirmed patient's thoughts, decisions, or attempts at behavior change, R - Reflected patient's change talk, and S - Summarized patient's change talk statements    Assessments completed prior to visit: 12/07/2022    The following assessments were completed by patient for this visit:  PHQ2:       1/24/2023     8:21 AM 8/30/2019     7:43 AM 4/25/2018    11:45 AM 6/16/2016    10:08 AM 6/9/2014     4:15 PM   PHQ-2 ( 1999 [employer])   Q1: Little interest or pleasure in doing things 1 3 0 0 0   Q2: Feeling down, depressed or hopeless 2 2 0 0 0   PHQ-2 Score 3 5 0 0 0   PHQ-2 Total Score (12-17 Years)- Positive if 3 or more points; Administer PHQ-A if positive  " 5      Q1: Little interest or pleasure in doing things Several days       Q2: Feeling down, depressed or hopeless More than half the days       PHQ-2 Score 3         PHQ9:       4/4/2023     6:55 AM 4/20/2023    10:22 AM 5/3/2023     2:49 PM 5/22/2023     3:55 PM 6/27/2023     8:10 PM 7/10/2023    12:49 PM 7/24/2023    11:47 AM   PHQ-9 SCORE   PHQ-9 Total Score MyChart 6 (Mild depression) 2 (Minimal depression) 2 (Minimal depression)  2 (Minimal depression) 3 (Minimal depression) 3 (Minimal depression)   PHQ-9 Total Score 6 2 2 3 2 3 3     GAD2:       3/1/2023     7:55 AM 4/4/2023     6:55 AM 4/20/2023    10:23 AM 5/3/2023     2:50 PM 6/27/2023     8:10 PM 7/10/2023    12:50 PM 7/24/2023    11:48 AM   LISA-2   Feeling nervous, anxious, or on edge 1 1 0 1 0 0 1   Not being able to stop or control worrying 0 1 0 0 0 0 0   LISA-2 Total Score 1 2 0 1 0 0 1     GAD7:       11/8/2021     4:24 PM 5/16/2022     4:27 PM 11/28/2022     2:10 PM 1/25/2023    10:09 AM 4/4/2023     2:03 PM 5/22/2023     3:55 PM 7/24/2023     1:50 PM   LISA-7 SCORE   Total Score    5 (mild anxiety)      Total Score 7 5 7 5 5 1 7     CAGE-AID:       11/29/2022    10:28 AM 4/20/2023     2:06 PM 5/4/2023    12:08 PM   CAGE-AID Total Score   Total Score 2 0 0   Total Score MyChart 2 (A total score of 2 or greater is considered clinically significant)       [place] Suicide Severity Rating Scale (Lifetime/Recent)      10/31/2019     9:03 AM   [place] Suicide Severity Rating (Lifetime/Recent)   Q1 Wish to be Dead (Lifetime) Yes   Q2 Non-Specific Active Suicidal Thoughts (Lifetime) Yes   RETIRED: 1. Wish to be Dead (Recent) Yes   RETIRED: 2. Non-Specific Active Suicidal Thoughts (Recent) Yes   3. Active Suicidal Ideation with any Methods (Not Plan) Without Intent to Act (Lifetime) No   RETIRED: 3. Active Suicidal Ideation with any Methods (Not Plan) Without Intent to Act (Recent) No   RETIRE: 4. Active Suicidal Ideation with Some Intent to Act, Without  Specific Plan (Lifetime) No   4. Active Suicidal Ideation with Some Intent to Act, Without Specific Plan (Recent) No   RETIRE: 5. Active Suicidal Ideation with Specific Plan and Intent (Lifetime) No   RETIRED: 5. Active Suicidal Ideation with Specific Plan and Intent (Recent) No   Actual Attempt (Lifetime) No   Actual Attempt (Past 3 Months) No   Has subject engaged in non-suicidal self-injurious behavior? (Lifetime) No   Has subject engaged in non-suicidal self-injurious behavior? (Past 3 Months) No   Interrupted Attempts (Lifetime) No   Interrupted Attempts (Past 3 Months) No   Aborted or Self-Interrupted Attempt (Lifetime) No   Aborted or Self-Interrupted Attempt (Past 3 Months) No   Preparatory Acts or Behavior (Lifetime) No   Preparatory Acts or Behavior (Past 3 Months) No       ASSESSMENT: Current Emotional / Mental Status (status of significant symptoms):   Risk status (Self / Other harm or suicidal ideation)   Patient denies current fears or concerns for personal safety.   Patient denies current or recent suicidal ideation or behaviors.   Patient denies current or recent homicidal ideation or behaviors.   Patient denies current or recent self injurious behavior or ideation.   Patient denies other safety concerns.   Patient reports there has been no change in risk factors since their last session.     Patient reports there has been no change in protective factors since their last session.     Recommended that patient call 911 or go to the local ED should there be a change in any of these risk factors.     Appearance:   Appropriate    Eye Contact:   Good    Psychomotor Behavior: Normal    Attitude:   Cooperative    Orientation:   Person Place Time Situation   Speech    Rate / Production: Normal     Volume:  Normal    Mood:    Normal   Affect:    Appropriate    Thought Content:  Clear    Thought Form:  Coherent  Logical    Insight:    Good      Medication Review:   No changes to current psychiatric  medication(s)     Medication Compliance:   Yes     Changes in Health Issues:   None reported     Chemical Use Review:   Substance Use: Chemical use reviewed, no active concerns identified      Tobacco Use: No current tobacco use.      Diagnosis:  1. Generalized anxiety disorder      Collateral Reports Completed:   Routed note to PCP    PLAN: (Patient Tasks / Therapist Tasks / Other):  Patient will use her safety plan as needed.   Patient will review the CBT skills sent via [employer]  Patient will reflect on today's discussion around how to approach stressful situation  Patient will keep up using tips discussed to increase confidence.   Patient will use the tips discussed to be able to control her anxiety  Patient next visit is on 8/07/2023    Francisco Smith, LICSW                                                ________________________________________________________________    Individual Treatment Plan    Patient's Name: Iker Edge  YOB: 2000    Date of Creation: 1/03/2023    Date Treatment Plan Last Reviewed/Revised:5/04/2023    DSM5 Diagnoses: 296.32 (F33.1) Major Depressive Disorder, Recurrent Episode, Moderate _ and With anxious distress or 300.00 (F41.9) Unspecified Anxiety Disorder     Psychosocial / Contextual Factors: Long standing history of depression since age 12-13. Can not pin point what was happening around that age.ongoing SI.     PROMIS (reviewed every 90 days):    Referral / Collaboration:  Referral to another professional/service is not indicated at this time..    Anticipated number of session for this episode of care: 9-12 sessions  Anticipation frequency of session: Biweekly  Anticipated Duration of each session: 53 or more minutes  Treatment plan will be reviewed in 90 days or when goals have been changed.     MeasurableTreatment Goal(s) related to diagnosis / functional impairment(s)  Goal 1: Patient will keep her depression under control in order to be productive      I  will know I've met my goal when the level of depression of 3/4 is reduced to 2/4 or better by the next review.       Objective #A (Patient Action)                           Status: Continued - Date: 5/04/2023   Patient will Improve concentration, focus, and mindfulness in daily activities     Intervention(s)  Therapist will assign homework : practice CBT skills to challenge any distortions and incease confidence.   teach Healthy life styles: sleep, balance diet, exercise and personal hygiene.      Objective #B  Patient will use at least 3 coping skills for depression management in the next 12 weeks   Status:update - Date: 5/04/2023  Intervention(s)  Therapist will assign homework : make a list of triggers and signs and discuss in the session for input.     Goal 2: Patient will develop healthy cognitive patterns and beliefs about self and the world that lead to alleviation and help prevent the relapse of depression symptoms.     I will know I've met my goal when my level of my depression is reduced from 3/4 to 2/4 or better by the next review.       Objective #A (Patient Action)                          Patient will identify at least 4 stressors which contribute to feelings of depression.  Status: update - Date: 5/04/2023     Intervention(s)  Therapist will teach distraction skills. including seff soothing with the 5 senses.     Objective #B  Patient will Identify negative self-talk and behaviors: challenge core beliefs, myths, and actions.  Status: update - Date: 5/04/2023  Intervention(s)  Therapist will Introduce CBT skills.     Objective #C  Patient will Increase interest, engagement, and pleasure in doing things.  Status:update - Date: 5/04/2023  Intervention(s)  Therapist will Encourage patient to share identify and share thoughts and feelings to increase self confidence .     Goal 3: Client will will stabilize anxiety level while increasing ability to function on a daily basis.     I will know I've met my goal  "when my anxiety is reduced from 4/4 to 3/4 or better by the next review       Objective #A (Client Action)                Client will Increase interest, engagement, and pleasure in doing things.  Status: update - Date: 5/04/2023     Intervention(s)  Therapist will provide educational materials on distraction activities.     Objective #B  Client will identify at least 4 fears / thoughts that contribute to feeling anxious.  Status: update - Date: 5/04/2023     Intervention(s)  Therapist will teach how to challenge negative thoughts using CBT skills including the 3 Cs.     Objective #C  Client will use thought-stopping strategy daily to reduce intrusive thoughts.  Status:update - Date: 5/04/2023     Intervention(s)              Therapist will Teach how to use CBT: 3 Cs to challenge the NTs as they   present.   Objective #D (Patient Action)                          Status: update - Date: 5/04/2023     Intervention(s)  Therapist will teach emotional recognition/identification. related to behavior change.     Objective #E  Patient will use thought-stopping strategy daily to reduce intrusive thoughts.  Status::  update - Date: 5/04/2023  Intervention(s)  Therapist will assist the patient Identify any distortions and emotions that affect patient's productivity.     Patient has reviewed and agreed to the above plan.      Francisco Smith, [facility]  5/04/2023         [facility]                                       Iker Edge-Now Brit Edge     SAFETY PLAN: Reviewed today 3/01/2023; 5/04/2023- Has not had SI for the last several weeks.  Step 1: Warning signs / cues (Thoughts, images, mood, situation, behavior) that a crisis may be developing:  Thoughts: \"I'm a burden\", \"I can't do this anymore\" and \"Nothing makes it better\"  Images: obsessive thoughts of death or dying: \" Suicide\", flashbacks and visions of harm: Cutting, burning self.  Thinking Processes: intrusive thoughts (bothersome, unwanted " "thoughts that come out of nowhere): things done in the past; memories of poor living situation for a year about 5-6 years ago. , highly critical and negative thoughts: \" I don't deserve it, I am not doing enough, not good enough\" and depersonalization  Mood: worsening depression, hopelessness, intense anger, agitation and mood swings  Behaviors: isolating/withdrawing , using alcohol, can't stop crying, aggression, not taking care of my responsibilities and increasing frequency and duration of dissociation  Situations: bullying: from co workers( current); schoolmates( past), public shame: how she perceives self and relationship problems   Step 2: Coping strategies - Things I can do to take my mind off of my problems without contacting another person (relaxation technique, physical activity):  Distress Tolerance Strategies:  read a book: Any and Writing ; video games  Physical Activities: go for a walk, gardening and stretching   Focus on helpful thoughts:  \"This is temporary\", \"I will get through this\" and \"It always passes\"  Step 3: People and social settings that provide distraction:   Name: Jeremiah ( Partner) Phone:  [phone]   Name:  Emerita ( good friend) Phone:    [phone]  coffee shop and park   Step 4: Remind myself of people and things that are important to me and worth living for:  Partner  Step 5: When I am in crisis, I can ask these people to help me use my safety plan:  Name: Jeremiah ( Partner) Phone:  [phone]  Name:  Emerita ( good friend) Phone:    [phone]  Step 6: Making the environment safe:   remove alcohol and be around others  Step 7: Professionals or agencies I can contact during a crisis:  Suicide Prevention Lifeline: Call or Text 888   Local Crisis Services:  [facility] crisis line 1-203.386.6901.    Call 501 or go to my nearest emergency department.   I helped develop this safety plan and agree to use it when needed.  I have been given a copy of this plan.   Client signature " _________________________________________________________________  Today s date:  1/3/2023  Completed by Provider Name/ Credentials: Francisco MckeongasgANGELO January 3, 2023  Adapted from Safety Plan Template 2008 Jazlyn Carreon and Bryan Weber is reprinted with the express permission of the authors.  No portion of the Safety Plan Template may be reproduced without the express, written permission.  You can contact the authors at [email or web address] or juan a@mail.[email or web address].  Answers for HPI/ROS submitted by the patient on 1/2/2023  If you checked off any problems, how difficult have these problems made it for you to do your work, take care of things at home, or get along with other people?: Somewhat difficult  PHQ9 TOTAL SCORE: 12  Answers for HPI/ROS submitted by the patient on 3/1/2023  If you checked off any problems, how difficult have these problems made it for you to do your work, take care of things at home, or get along with other people?: Not difficult at all  PHQ9 TOTAL SCORE: 12    Answers for HPI/ROS submitted by the patient on 4/4/2023  If you checked off any problems, how difficult have these problems made it for you to do your work, take care of things at home, or get along with other people?: Not difficult at all  PHQ9 TOTAL SCORE: 6    Answers for HPI/ROS submitted by the patient on 4/20/2023  If you checked off any problems, how difficult have these problems made it for you to do your work, take care of things at home, or get along with other people?: Not difficult at all  PHQ9 TOTAL SCORE: 2    Answers for HPI/ROS submitted by the patient on 5/3/2023  If you checked off any problems, how difficult have these problems made it for you to do your work, take care of things at home, or get along with other people?: Not difficult at all  PHQ9 TOTAL SCORE: 2  Answers for HPI/ROS submitted by the patient on 6/27/2023  If you checked off any problems, how difficult have these problems made it for  you to do your work, take care of things at home, or get along with other people?: Not difficult at all  PHQ9 TOTAL SCORE: 2    Answers for HPI/ROS submitted by the patient on 7/10/2023  If you checked off any problems, how difficult have these problems made it for you to do your work, take care of things at home, or get along with other people?: Not difficult at all  PHQ9 TOTAL SCORE: 3    Answers submitted by the patient for this visit:  Patient Health Questionnaire (Submitted on 7/24/2023)  If you checked off any problems, how difficult have these problems made it for you to do your work, take care of things at home, or get along with other people?: Somewhat difficult  PHQ9 TOTAL SCORE: 3

## 2023-08-07 ENCOUNTER — VIRTUAL VISIT (OUTPATIENT)
Dept: PSYCHOLOGY | Facility: CLINIC | Age: 23
End: 2023-08-07
Payer: COMMERCIAL

## 2023-08-07 DIAGNOSIS — F41.1 GENERALIZED ANXIETY DISORDER: Primary | ICD-10-CM

## 2023-08-07 PROCEDURE — 90837 PSYTX W PT 60 MINUTES: CPT | Mod: VID | Performed by: SOCIAL WORKER

## 2023-08-07 NOTE — PROGRESS NOTES
[facility]                                      Progress Note    Patient Name: Iker Edge  Date:8/07/2023      Service Type: Individual      Session Start Time: 12:03    Session End Time:13:00     Session Length: 57    Session #:11    Attendees: Client    Service Modality:  Video Visit:      Provider verified identity through the following two step process.  Patient provided:  Patient is known previously to provider    Telemedicine Visit: The patient's condition can be safely assessed and treated via synchronous audio and visual telemedicine encounter.      Reason for Telemedicine Visit: Services only offered telehealth    Originating Site (Patient Location): Patient's home    Distant Site (Provider Location): Provider Remote Setting    Consent:  The patient/guardian has verbally consented to: the potential risks and benefits of telemedicine (video visit) versus in person care; bill my insurance or make self-payment for services provided; and responsibility for payment of non-covered services.     Patient would like the video invitation sent by:  My Chart    Mode of Communication:  Video Conference via [employer]    Distant Location (Provider):  Off-site    As the provider I attest to compliance with applicable laws and regulations related to telemedicine.    DATA  Interactive Complexity: Yes, visit entailed Interactive Complexity evidenced by:  Nightmares about past experiences while in college. Not easy to handle alone. Want more time to process it.    Crisis: No      Progress Since Last Session (Related to Symptoms / Goals / Homework):   Symptoms: Worsening anxiety related to the move    Homework: Partially completed      Episode of Care Goals: Satisfactory progress - ACTION (Actively working towards change); Intervened by reinforcing change plan / affirming steps taken    Current / Ongoing Stressors and Concerns:Patient has managed to  move up to [place] tp be closer to the campus.  Starting school with her partner in 2 weeks. Though since moving a week ago, she has been experiencing nightmares, some of which affect her day. Like while in the grocery store, she feels she is in [place]. Was able to share her experience in [place]. Will be using the tips discussed to feel connected to all the 3 times on her time line without being stuff with past experiences. Patient has nope from her schooling which will be her motivation to get going.  Reports no other concerns. Over all shard she was happy and will continue her visits. Only will make adjustments once she knows how her work schedule would be. She currently has no job yet. Her next visit is in 2 weeks.     Treatment Objective(s) Addressed in This Session:   Identify negative self-talk and behaviors: challenge core beliefs, myths, and actions      Intervention: Reviewed her TP today. Making progress using the 3 CS.     Situation      Automatic Thoughts  Cognitive Distortions    Feelings      Behavior      Questioning Thoughts        MI Intervention:     Change Talk Expressed by the Patient: Taking steps    Provider Response to Change Talk: E - Evoked more info from patient about behavior change, A - Affirmed patient's thoughts, decisions, or attempts at behavior change, R - Reflected patient's change talk, and S - Summarized patient's change talk statements    Assessments completed prior to visit: 12/07/2022    The following assessments were completed by patient for this visit:  PHQ2:       1/24/2023     8:21 AM 8/30/2019     7:43 AM 4/25/2018    11:45 AM 6/16/2016    10:08 AM 6/9/2014     4:15 PM   PHQ-2 ( 1999 [employer])   Q1: Little interest or pleasure in doing things 1 3 0 0 0   Q2: Feeling down, depressed or hopeless 2 2 0 0 0   PHQ-2 Score 3 5 0 0 0   PHQ-2 Total Score (12-17 Years)- Positive if 3 or more points; Administer PHQ-A if positive  5      Q1: Little interest or pleasure in doing things Several days       Q2: Feeling down, depressed or  hopeless More than half the days       PHQ-2 Score 3         PHQ9:       4/20/2023    10:22 AM 5/3/2023     2:49 PM 5/22/2023     3:55 PM 6/27/2023     8:10 PM 7/10/2023    12:49 PM 7/24/2023    11:47 AM 8/7/2023    11:33 AM   PHQ-9 SCORE   PHQ-9 Total Score MyChart 2 (Minimal depression) 2 (Minimal depression)  2 (Minimal depression) 3 (Minimal depression) 3 (Minimal depression) 2 (Minimal depression)   PHQ-9 Total Score 2 2 3 2 3 3 2     GAD2:       4/4/2023     6:55 AM 4/20/2023    10:23 AM 5/3/2023     2:50 PM 6/27/2023     8:10 PM 7/10/2023    12:50 PM 7/24/2023    11:48 AM 8/7/2023    11:33 AM   LISA-2   Feeling nervous, anxious, or on edge 1 0 1 0 0 1 1   Not being able to stop or control worrying 1 0 0 0 0 0 0   LISA-2 Total Score 2 0 1 0 0 1 1     GAD7:       11/8/2021     4:24 PM 5/16/2022     4:27 PM 11/28/2022     2:10 PM 1/25/2023    10:09 AM 4/4/2023     2:03 PM 5/22/2023     3:55 PM 7/24/2023     1:50 PM   LISA-7 SCORE   Total Score    5 (mild anxiety)      Total Score 7 5 7 5 5 1 7     CAGE-AID:       11/29/2022    10:28 AM 4/20/2023     2:06 PM 5/4/2023    12:08 PM   CAGE-AID Total Score   Total Score 2 0 0   Total Score MyChart 2 (A total score of 2 or greater is considered clinically significant)       Barnhart Suicide Severity Rating Scale (Lifetime/Recent)      10/31/2019     9:03 AM   Barnhart Suicide Severity Rating (Lifetime/Recent)   Q1 Wish to be Dead (Lifetime) Yes   Q2 Non-Specific Active Suicidal Thoughts (Lifetime) Yes   RETIRED: 1. Wish to be Dead (Recent) Yes   RETIRED: 2. Non-Specific Active Suicidal Thoughts (Recent) Yes   3. Active Suicidal Ideation with any Methods (Not Plan) Without Intent to Act (Lifetime) No   RETIRED: 3. Active Suicidal Ideation with any Methods (Not Plan) Without Intent to Act (Recent) No   RETIRE: 4. Active Suicidal Ideation with Some Intent to Act, Without Specific Plan (Lifetime) No   4. Active Suicidal Ideation with Some Intent to Act, Without Specific Plan  (Recent) No   RETIRE: 5. Active Suicidal Ideation with Specific Plan and Intent (Lifetime) No   RETIRED: 5. Active Suicidal Ideation with Specific Plan and Intent (Recent) No   Actual Attempt (Lifetime) No   Actual Attempt (Past 3 Months) No   Has subject engaged in non-suicidal self-injurious behavior? (Lifetime) No   Has subject engaged in non-suicidal self-injurious behavior? (Past 3 Months) No   Interrupted Attempts (Lifetime) No   Interrupted Attempts (Past 3 Months) No   Aborted or Self-Interrupted Attempt (Lifetime) No   Aborted or Self-Interrupted Attempt (Past 3 Months) No   Preparatory Acts or Behavior (Lifetime) No   Preparatory Acts or Behavior (Past 3 Months) No       ASSESSMENT: Current Emotional / Mental Status (status of significant symptoms):   Risk status (Self / Other harm or suicidal ideation)   Patient denies current fears or concerns for personal safety.   Patient denies current or recent suicidal ideation or behaviors.   Patient denies current or recent homicidal ideation or behaviors.   Patient denies current or recent self injurious behavior or ideation.   Patient denies other safety concerns.   Patient reports there has been no change in risk factors since their last session.     Patient reports there has been no change in protective factors since their last session.     Recommended that patient call 911 or go to the local ED should there be a change in any of these risk factors.     Appearance:   Appropriate    Eye Contact:   Good    Psychomotor Behavior: Normal    Attitude:   Cooperative    Orientation:   Person Place Time Situation   Speech    Rate / Production: Normal     Volume:  Normal    Mood:    Normal   Affect:    Appropriate    Thought Content:  Clear    Thought Form:  Coherent  Logical    Insight:    Good      Medication Review:   No changes to current psychiatric medication(s)     Medication Compliance:   Yes     Changes in Health Issues:   None reported     Chemical Use  Review:   Substance Use: Chemical use reviewed, no active concerns identified      Tobacco Use: No current tobacco use.      Diagnosis:  1. Generalized anxiety disorder      Collateral Reports Completed:   Routed note to PCP    PLAN: (Patient Tasks / Therapist Tasks / Other): Keep these goals  Patient will use her safety plan as needed.   Patient will review the CBT skills sent via [employer]  Patient will reflect on today's discussion around how to approach stressful situation  Patient will keep up using tips discussed to increase confidence.   Patient will use the tips discussed to be able to control her anxiety  Patient next visit is on 8/23/2023    Francisco Smith LICSW                                                ________________________________________________________________    Individual Treatment Plan    Patient's Name: Iker Edge  YOB: 2000    Date of Creation: 1/03/2023    Date Treatment Plan Last Reviewed/Revised:5/04/2023    DSM5 Diagnoses: 296.32 (F33.1) Major Depressive Disorder, Recurrent Episode, Moderate _ and With anxious distress or 300.00 (F41.9) Unspecified Anxiety Disorder     Psychosocial / Contextual Factors: Long standing history of depression since age 12-13. Can not pin point what was happening around that age.ongoing SI.     PROMIS (reviewed every 90 days):    Referral / Collaboration:  Referral to another professional/service is not indicated at this time..    Anticipated number of session for this episode of care: 9-12 sessions  Anticipation frequency of session: Biweekly  Anticipated Duration of each session: 53 or more minutes  Treatment plan will be reviewed in 90 days or when goals have been changed.     MeasurableTreatment Goal(s) related to diagnosis / functional impairment(s)  Goal 1: Patient will keep her depression under control in order to be productive      I will know I've met my goal when the level of depression of 3/4 is reduced to 2/4 or better by  the next review.       Objective #A (Patient Action)                           Status: Continued - Date: 5/04/2023   Patient will Improve concentration, focus, and mindfulness in daily activities     Intervention(s)  Therapist will assign homework : practice CBT skills to challenge any distortions and incease confidence.   teach Healthy life styles: sleep, balance diet, exercise and personal hygiene.      Objective #B  Patient will use at least 3 coping skills for depression management in the next 12 weeks   Status:update - Date: 5/04/2023  Intervention(s)  Therapist will assign homework : make a list of triggers and signs and discuss in the session for input.     Goal 2: Patient will develop healthy cognitive patterns and beliefs about self and the world that lead to alleviation and help prevent the relapse of depression symptoms.     I will know I've met my goal when my level of my depression is reduced from 3/4 to 2/4 or better by the next review.       Objective #A (Patient Action)                          Patient will identify at least 4 stressors which contribute to feelings of depression.  Status: update - Date: 5/04/2023     Intervention(s)  Therapist will teach distraction skills. including seff soothing with the 5 senses.     Objective #B  Patient will Identify negative self-talk and behaviors: challenge core beliefs, myths, and actions.  Status: update - Date: 5/04/2023  Intervention(s)  Therapist will Introduce CBT skills.     Objective #C  Patient will Increase interest, engagement, and pleasure in doing things.  Status:update - Date: 5/04/2023  Intervention(s)  Therapist will Encourage patient to share identify and share thoughts and feelings to increase self confidence .     Goal 3: Client will will stabilize anxiety level while increasing ability to function on a daily basis.     I will know I've met my goal when my anxiety is reduced from 4/4 to 3/4 or better by the next review       Objective #A  "(Client Action)                Client will Increase interest, engagement, and pleasure in doing things.  Status: update - Date: 5/04/2023     Intervention(s)  Therapist will provide educational materials on distraction activities.     Objective #B  Client will identify at least 4 fears / thoughts that contribute to feeling anxious.  Status: update - Date: 5/04/2023     Intervention(s)  Therapist will teach how to challenge negative thoughts using CBT skills including the 3 Cs.     Objective #C  Client will use thought-stopping strategy daily to reduce intrusive thoughts.  Status:update - Date: 5/04/2023     Intervention(s)              Therapist will Teach how to use CBT: 3 Cs to challenge the NTs as they   present.   Objective #D (Patient Action)                          Status: update - Date: 5/04/2023     Intervention(s)  Therapist will teach emotional recognition/identification. related to behavior change.     Objective #E  Patient will use thought-stopping strategy daily to reduce intrusive thoughts.  Status::  update - Date: 5/04/2023  Intervention(s)  Therapist will assist the patient Identify any distortions and emotions that affect patient's productivity.     Patient has reviewed and agreed to the above plan.    rFancisco Smith [facility]  5/04/2023         [facility]                                       Iker Edge-Now Brit Edge     SAFETY PLAN: Reviewed today 3/01/2023; 5/04/2023- Has not had SI for the last several weeks.  Step 1: Warning signs / cues (Thoughts, images, mood, situation, behavior) that a crisis may be developing:  Thoughts: \"I'm a burden\", \"I can't do this anymore\" and \"Nothing makes it better\"  Images: obsessive thoughts of death or dying: \" Suicide\", flashbacks and visions of harm: Cutting, burning self.  Thinking Processes: intrusive thoughts (bothersome, unwanted thoughts that come out of nowhere): things done in the past; memories of poor living situation for " "a year about 5-6 years ago. , highly critical and negative thoughts: \" I don't deserve it, I am not doing enough, not good enough\" and depersonalization  Mood: worsening depression, hopelessness, intense anger, agitation and mood swings  Behaviors: isolating/withdrawing , using alcohol, can't stop crying, aggression, not taking care of my responsibilities and increasing frequency and duration of dissociation  Situations: bullying: from co workers( current); schoolmates( past), public shame: how she perceives self and relationship problems   Step 2: Coping strategies - Things I can do to take my mind off of my problems without contacting another person (relaxation technique, physical activity):  Distress Tolerance Strategies:  read a book: Any and Writing ; video games  Physical Activities: go for a walk, gardening and stretching   Focus on helpful thoughts:  \"This is temporary\", \"I will get through this\" and \"It always passes\"  Step 3: People and social settings that provide distraction:   Name: Jeremiah ( Partner) Phone:  [phone]   Name:  Emerita ( good friend) Phone:    [phone]  coffee shop and park   Step 4: Remind myself of people and things that are important to me and worth living for:  Partner  Step 5: When I am in crisis, I can ask these people to help me use my safety plan:  Name: Jeremiah ( Partner) Phone:  [phone]  Name:  Emerita ( good friend) Phone:    [phone]  Step 6: Making the environment safe:   remove alcohol and be around others  Step 7: Professionals or agencies I can contact during a crisis:  Suicide Prevention Lifeline: Call or Text 793   Local Crisis Services:  [facility] crisis line [phone].    Call 621 or go to my nearest emergency department.   I helped develop this safety plan and agree to use it when needed.  I have been given a copy of this plan.   Client signature _________________________________________________________________  Today s date:  1/3/2023  Completed by " Provider Name/ Credentials: Francisco MckeongasgJESSESW January 3, 2023  Adapted from Safety Plan Template 2008 Jazlyn Carreon and Bryan Weber is reprinted with the express permission of the authors.  No portion of the Safety Plan Template may be reproduced without the express, written permission.  You can contact the authors at [email or web address] or [email or web address].  Answers for HPI/ROS submitted by the patient on 1/2/2023  If you checked off any problems, how difficult have these problems made it for you to do your work, take care of things at home, or get along with other people?: Somewhat difficult  PHQ9 TOTAL SCORE: 12  Answers for HPI/ROS submitted by the patient on 3/1/2023  If you checked off any problems, how difficult have these problems made it for you to do your work, take care of things at home, or get along with other people?: Not difficult at all  PHQ9 TOTAL SCORE: 12    Answers for HPI/ROS submitted by the patient on 4/4/2023  If you checked off any problems, how difficult have these problems made it for you to do your work, take care of things at home, or get along with other people?: Not difficult at all  PHQ9 TOTAL SCORE: 6    Answers for HPI/ROS submitted by the patient on 4/20/2023  If you checked off any problems, how difficult have these problems made it for you to do your work, take care of things at home, or get along with other people?: Not difficult at all  PHQ9 TOTAL SCORE: 2    Answers for HPI/ROS submitted by the patient on 5/3/2023  If you checked off any problems, how difficult have these problems made it for you to do your work, take care of things at home, or get along with other people?: Not difficult at all  PHQ9 TOTAL SCORE: 2  Answers for HPI/ROS submitted by the patient on 6/27/2023  If you checked off any problems, how difficult have these problems made it for you to do your work, take care of things at home, or get along with other people?: Not difficult at  all  PHQ9 TOTAL SCORE: 2    Answers for HPI/ROS submitted by the patient on 7/10/2023  If you checked off any problems, how difficult have these problems made it for you to do your work, take care of things at home, or get along with other people?: Not difficult at all  PHQ9 TOTAL SCORE: 3    Answers submitted by the patient for this visit:  Patient Health Questionnaire (Submitted on 7/24/2023)  If you checked off any problems, how difficult have these problems made it for you to do your work, take care of things at home, or get along with other people?: Somewhat difficult  PHQ9 TOTAL SCORE: 3  Answers submitted by the patient for this visit:  Patient Health Questionnaire (Submitted on 8/7/2023)  If you checked off any problems, how difficult have these problems made it for you to do your work, take care of things at home, or get along with other people?: Not difficult at all  PHQ9 TOTAL SCORE: 2

## 2023-08-23 ENCOUNTER — VIRTUAL VISIT (OUTPATIENT)
Dept: PSYCHOLOGY | Facility: CLINIC | Age: 23
End: 2023-08-23
Payer: COMMERCIAL

## 2023-08-23 DIAGNOSIS — F41.1 GENERALIZED ANXIETY DISORDER: Primary | ICD-10-CM

## 2023-08-23 PROCEDURE — 90837 PSYTX W PT 60 MINUTES: CPT | Mod: VID | Performed by: SOCIAL WORKER

## 2023-08-23 ASSESSMENT — ANXIETY QUESTIONNAIRES
IF YOU CHECKED OFF ANY PROBLEMS ON THIS QUESTIONNAIRE, HOW DIFFICULT HAVE THESE PROBLEMS MADE IT FOR YOU TO DO YOUR WORK, TAKE CARE OF THINGS AT HOME, OR GET ALONG WITH OTHER PEOPLE: SOMEWHAT DIFFICULT
5. BEING SO RESTLESS THAT IT IS HARD TO SIT STILL: NOT AT ALL
GAD7 TOTAL SCORE: 6
2. NOT BEING ABLE TO STOP OR CONTROL WORRYING: SEVERAL DAYS
1. FEELING NERVOUS, ANXIOUS, OR ON EDGE: MORE THAN HALF THE DAYS
7. FEELING AFRAID AS IF SOMETHING AWFUL MIGHT HAPPEN: SEVERAL DAYS
4. TROUBLE RELAXING: SEVERAL DAYS
6. BECOMING EASILY ANNOYED OR IRRITABLE: NOT AT ALL
3. WORRYING TOO MUCH ABOUT DIFFERENT THINGS: SEVERAL DAYS
GAD7 TOTAL SCORE: 6

## 2023-08-23 ASSESSMENT — COLUMBIA-SUICIDE SEVERITY RATING SCALE - C-SSRS
6. HAVE YOU EVER DONE ANYTHING, STARTED TO DO ANYTHING, OR PREPARED TO DO ANYTHING TO END YOUR LIFE?: NO
TOTAL  NUMBER OF ABORTED OR SELF INTERRUPTED ATTEMPTS SINCE LAST CONTACT: NO
TOTAL  NUMBER OF INTERRUPTED ATTEMPTS SINCE LAST CONTACT: NO
2. HAVE YOU ACTUALLY HAD ANY THOUGHTS OF KILLING YOURSELF?: NO
ATTEMPT SINCE LAST CONTACT: NO
SUICIDE, SINCE LAST CONTACT: NO
1. SINCE LAST CONTACT, HAVE YOU WISHED YOU WERE DEAD OR WISHED YOU COULD GO TO SLEEP AND NOT WAKE UP?: NO

## 2023-09-07 ENCOUNTER — TELEPHONE (OUTPATIENT)
Dept: FAMILY MEDICINE | Facility: CLINIC | Age: 23
End: 2023-09-07
Payer: COMMERCIAL

## 2023-09-07 NOTE — TELEPHONE ENCOUNTER
Prior Authorization Retail Medication Request    Medication/Dose: Estradiol 2MG tablets  ICD code (if different than what is on RX):    Previously Tried and Failed:    Rationale:    KEY: [id number]    Insurance Name:    Insurance ID:        Pharmacy Information (if different than what is on RX)  Name:    Phone:      Rylie DOBBS on 9/7/2023 at 5:47 PM

## 2023-09-11 NOTE — TELEPHONE ENCOUNTER
Central Prior Authorization Team   Phone: [phone]    PA Initiation    Medication: ESTRADIOL 2 MG PO TABS  Insurance Company: [employer] - Phone [phone] Fax [phone]  Pharmacy Filling the Rx: [facility] [place] - [id number] [facility]  Filling Pharmacy Phone: [phone]  Filling Pharmacy Fax:    Start Date: 9/11/2023    Manually faxed form and chart notes to insurance -

## 2023-09-14 NOTE — TELEPHONE ENCOUNTER
Prior Authorization Approval    Medication: ESTRADIOL 2 MG PO TABS  Authorization Effective Date: 9/11/2023  Authorization Expiration Date: 9/10/2024  Insurance Company: [employer] - Phone [phone] Fax [phone]  Which Pharmacy is filling the prescription: [facility] - [facility] [id number] [facility]  Pharmacy Notified: Yes  Patient Notified: Yes (pharmacy will notify patient when ready)  Called patient, no answer so left v/m notifying of an approval and to contact pharmacy to have filled.    [facility]ved 9/11/23-9/10/24   Medication coverage and qty limits are approved. Karina will refax approval letter.

## 2023-10-31 ENCOUNTER — MYC MEDICAL ADVICE (OUTPATIENT)
Dept: FAMILY MEDICINE | Facility: CLINIC | Age: 23
End: 2023-10-31
Payer: COMMERCIAL

## 2023-10-31 DIAGNOSIS — Z51.81 ENCOUNTER FOR THERAPEUTIC DRUG MONITORING: ICD-10-CM

## 2023-10-31 DIAGNOSIS — F64.9 GENDER DYSPHORIA: Primary | ICD-10-CM

## 2023-10-31 NOTE — TELEPHONE ENCOUNTER
Please see patient mychart message. Patient wondering about routine labs with hormone replacement therapy.    Joyce Mejía RN

## 2023-11-24 ENCOUNTER — LAB (OUTPATIENT)
Dept: LAB | Facility: CLINIC | Age: 23
End: 2023-11-24
Payer: COMMERCIAL

## 2023-11-24 ENCOUNTER — IMMUNIZATION (OUTPATIENT)
Dept: FAMILY MEDICINE | Facility: CLINIC | Age: 23
End: 2023-11-24
Payer: COMMERCIAL

## 2023-11-24 DIAGNOSIS — F64.9 GENDER DYSPHORIA: ICD-10-CM

## 2023-11-24 DIAGNOSIS — Z51.81 ENCOUNTER FOR THERAPEUTIC DRUG MONITORING: ICD-10-CM

## 2023-11-24 LAB
ANION GAP SERPL CALCULATED.3IONS-SCNC: 12 MMOL/L (ref 7–15)
BUN SERPL-MCNC: 12.7 MG/DL (ref 6–20)
CALCIUM SERPL-MCNC: 9.4 MG/DL (ref 8.6–10)
CHLORIDE SERPL-SCNC: 102 MMOL/L (ref 98–107)
CREAT SERPL-MCNC: 0.77 MG/DL (ref 0.51–1.17)
DEPRECATED HCO3 PLAS-SCNC: 26 MMOL/L (ref 22–29)
EGFRCR SERPLBLD CKD-EPI 2021: >90 ML/MIN/1.73M2
ESTRADIOL SERPL-MCNC: 103 PG/ML
GLUCOSE SERPL-MCNC: 74 MG/DL (ref 70–99)
POTASSIUM SERPL-SCNC: 3.8 MMOL/L (ref 3.4–5.3)
SODIUM SERPL-SCNC: 140 MMOL/L (ref 135–145)

## 2023-11-24 PROCEDURE — 84403 ASSAY OF TOTAL TESTOSTERONE: CPT

## 2023-11-24 PROCEDURE — 82670 ASSAY OF TOTAL ESTRADIOL: CPT

## 2023-11-24 PROCEDURE — 91320 SARSCV2 VAC 30MCG TRS-SUC IM: CPT

## 2023-11-24 PROCEDURE — 90686 IIV4 VACC NO PRSV 0.5 ML IM: CPT

## 2023-11-24 PROCEDURE — 36415 COLL VENOUS BLD VENIPUNCTURE: CPT

## 2023-11-24 PROCEDURE — 90471 IMMUNIZATION ADMIN: CPT

## 2023-11-24 PROCEDURE — 90480 ADMN SARSCOV2 VAC 1/ONLY CMP: CPT

## 2023-11-24 PROCEDURE — 80048 BASIC METABOLIC PNL TOTAL CA: CPT

## 2023-11-28 LAB — TESTOST SERPL-MCNC: 21 NG/DL (ref 8–950)

## 2024-01-31 NOTE — PROGRESS NOTES
[facility]                                     Progress Note    Patient Name: Iker Edge  Date:3/01/2023      Service Type: Individual      Session Start Time: 8:02 Session End Time:8:56     Session Length:54    Session #:4    Attendees: Client    Service Modality:  Video Visit:      Provider verified identity through the following two step process.  Patient provided:  Patient photo, Patient  and Patient is known previously to provider    Telemedicine Visit: The patient's condition can be safely assessed and treated via synchronous audio and visual telemedicine encounter.      Reason for Telemedicine Visit: Services only offered telehealth    Originating Site (Patient Location): Patient's home    Distant Site (Provider Location): Provider Remote Setting    Consent:  The patient/guardian has verbally consented to: the potential risks and benefits of telemedicine (video visit) versus in person care; bill my insurance or make self-payment for services provided; and responsibility for payment of non-covered services.     Patient would like the video invitation sent by:  My Chart    Mode of Communication:  Video Conference via [facility]    Distant Location (Provider):  Off-site    As the provider I attest to compliance with applicable laws and regulations related to telemedicine.    DATA  Interactive Complexity: No  Crisis: No      Progress Since Last Session (Related to Symptoms / Goals / Homework):   Symptoms: No change :still has passive SI, stil has developed a safety plan    Homework: Partially completed      Episode of Care Goals: Satisfactory progress - ACTION (Actively working towards change); Intervened by reinforcing change plan / affirming steps taken    Current / Ongoing Stressors and Concerns: Patient was contacted for an opening today which she was able to take.  Reports ongoing passive SI. Has a  Safety plan. Feels it is her normal. Has been feeling down, depressed lately after  losing her daily routine.  Had to dog sit for a friend for a week. She had been keeping a sounding plan that included a time to exercise, play piano, to relax and write. Felt good after leaving her job. Has a plan to wait a little bit but can help friends with some small project.  Today, patient discussed what she feels might be slowing her down and how to bring her confidence. Patient understands her thoughts sometimes do not reflect her abilities. Finds herself on the negative side, self doubt, despair, feeling crushed, not feeling good enough and hopeless.  Though won't find a reason for this. Wants to keep up with a reminder and encouragement. Will continue to ask herself who she is. Will define happiness in her own terms and will try to welcome compliments from those around her. Her next visit is in 2 weeks.      Treatment Objective(s) Addressed in This Session:   Identify negative self-talk and behaviors: challenge core beliefs, myths, and actions     Intervention: Reviewed     Situation        Automatic Thoughts  Cognitive Distortions      Feelings        Behavior        Questioning Thoughts          MI Intervention: Expressed Empathy/Understanding, Supported Autonomy, Collaboration, Evocation, Permission to raise concern or advise and Open-ended questions     Change Talk Expressed by the Patient: Taking steps    Provider Response to Change Talk: E - Evoked more info from patient about behavior change, A - Affirmed patient's thoughts, decisions, or attempts at behavior change, R - Reflected patient's change talk and S - Summarized patient's change talk statements    Assessments completed prior to visit: 12/07/2022    The following assessments were completed by patient for this visit:  PHQ2:   PHQ-2 ( 1999 [employer]) 1/24/2023 8/30/2019 4/25/2018 6/16/2016 6/9/2014   Q1: Little interest or pleasure in doing things 1 3 0 0 0   Q2: Feeling down, depressed or hopeless 2 2 0 0 0   PHQ-2 Score 3 5 0 0 0   PHQ-2 Total  Score (12-17 Years)- Positive if 3 or more points; Administer PHQ-A if positive - 5 - - -   Q1: Little interest or pleasure in doing things Several days - - - -   Q2: Feeling down, depressed or hopeless More than half the days - - - -   PHQ-2 Score 3 - - - -     PHQ9:   PHQ-9 SCORE 4/26/2021 11/8/2021 5/16/2022 11/28/2022 1/2/2023 1/24/2023 3/1/2023   PHQ-9 Total Score MyChart - - - - 12 (Moderate depression) 9 (Mild depression) 12 (Moderate depression)   PHQ-9 Total Score 10 6 4 14 12 9 12     GAD2:   LISA-2 11/29/2022 1/2/2023 3/1/2023   Feeling nervous, anxious, or on edge 1 1 1   Not being able to stop or control worrying 1 0 0   LISA-2 Total Score 2 1 1     GAD7:   LISA-7 SCORE 2/24/2020 6/18/2020 4/26/2021 11/8/2021 5/16/2022 11/28/2022 1/25/2023   Total Score - - - - - - 5 (mild anxiety)   Total Score 9 6 11 7 5 7 5     CAGE-AID:   CAGE-AID Total Score 11/29/2022   Total Score 2   Total Score MyChart 2 (A total score of 2 or greater is considered clinically significant)     Hodges Suicide Severity Rating Scale (Lifetime/Recent)  Hodges Suicide Severity Rating (Lifetime/Recent) 10/31/2019   Wish to be Dead (Lifetime) Yes   Non-Specific Active Suicidal Thoughts (Lifetime) Yes   RETIRED: 1. Wish to be Dead (Recent) Yes   RETIRED: 2. Non-Specific Active Suicidal Thoughts (Recent) Yes   3. Active Suicidal Ideation with any Methods (Not Plan) Without Intent to Act (Lifetime) No   RETIRED: 3. Active Suicidal Ideation with any Methods (Not Plan) Without Intent to Act (Recent) No   RETIRE: 4. Active Suicidal Ideation with Some Intent to Act, Without Specific Plan (Lifetime) No   4. Active Suicidal Ideation with Some Intent to Act, Without Specific Plan (Recent) No   RETIRE: 5. Active Suicidal Ideation with Specific Plan and Intent (Lifetime) No   RETIRED: 5. Active Suicidal Ideation with Specific Plan and Intent (Recent) No   Actual Attempt (Lifetime) No   Actual Attempt (Past 3 Months) No   Has subject engaged in  non-suicidal self-injurious behavior? (Lifetime) No   Has subject engaged in non-suicidal self-injurious behavior? (Past 3 Months) No   Interrupted Attempts (Lifetime) No   Interrupted Attempts (Past 3 Months) No   Aborted or Self-Interrupted Attempt (Lifetime) No   Aborted or Self-Interrupted Attempt (Past 3 Months) No   Preparatory Acts or Behavior (Lifetime) No   Preparatory Acts or Behavior (Past 3 Months) No       ASSESSMENT: Current Emotional / Mental Status (status of significant symptoms):   Risk status (Self / Other harm or suicidal ideation)   Patient denies current fears or concerns for personal safety.   Patient denies current or recent suicidal ideation or behaviors.   Patient denies current or recent homicidal ideation or behaviors.   Patient denies current or recent self injurious behavior or ideation.   Patient denies other safety concerns.   Patient reports there has been no change in risk factors since their last session.     Patient reports there has been no change in protective factors since their last session.     Recommended that patient call 911 or go to the local ED should there be a change in any of these risk factors.     Appearance:   Appropriate    Eye Contact:   Good    Psychomotor Behavior: Normal    Attitude:   Cooperative    Orientation:   Person Place Time Situation   Speech    Rate / Production: Normal     Volume:  Normal    Mood:    Normal   Affect:    Appropriate    Thought Content:  Clear    Thought Form:  Coherent  Logical    Insight:    Good      Medication Review:   No changes to current psychiatric medication(s)     Medication Compliance:   Yes     Changes in Health Issues:   None reported     Chemical Use Review:   Substance Use: Chemical use reviewed, no active concerns identified      Tobacco Use: No current tobacco use.      Diagnosis:  1. MDD (major depressive disorder), recurrent episode, moderate (H)      Collateral Reports Completed:   Routed note to PCP    PLAN:  (Patient Tasks / Therapist Tasks / Other)  Patient will use her safety plan as needed.   Patient will review the CBT skills sent via [employer]  Patient will reflect on today's visit and made a sounding decision: will  continue to define Happiness in her own terms.   Patient next visit is on 3/16- will be called if any opening sooner    Specolayinka Smith, LICSW                                                ________________________________________________________________    Individual Treatment Plan    Patient's Name: Iker Edge  YOB: 2000    Date of Creation: 1/03/2023    Date Treatment Plan Last Reviewed/Revised: 1/03/2023    DSM5 Diagnoses: 296.32 (F33.1) Major Depressive Disorder, Recurrent Episode, Moderate _ and With anxious distress or 300.00 (F41.9) Unspecified Anxiety Disorder     Psychosocial / Contextual Factors: Long standing history of depression since age 12-13. Can not pin point what was happening around that age.ongoing SI.     PROMIS (reviewed every 90 days): 26    Referral / Collaboration:  Referral to another professional/service is not indicated at this time..    Anticipated number of session for this episode of care: 9-12 sessions  Anticipation frequency of session: Biweekly  Anticipated Duration of each session: 53 or more minutes  Treatment plan will be reviewed in 90 days or when goals have been changed.     MeasurableTreatment Goal(s) related to diagnosis / functional impairment(s)  Goal 1: Patient will keep her depression under control in order to be productive      I will know I've met my goal when the level of depression of 4/4 is reduced  to 3/4 or better by the next review.       Objective #A (Patient Action)                           Status: Continued - Date(s):10/06/2022   Patient will Improve concentration, focus, and mindfulness in daily activities   Intervention(s)  Therapist will assign homework : practice CBT skills to challenge any distortions and incease  confidence.   teach Healthy life styles: sleep, balance diet, exercise and personal hygiene.      Objective #B  Patient will use at least 3 coping skills for depression management in the next 12 weeks   Status: New - Date(s):1/03/2023  Intervention(s)  Therapist will assign homework : make a list of triggers and signs and discuss in the session for input.     Goal 2: Patient will develop healthy cognitive patterns and beliefs about self and the world that lead to alleviation and help prevent the relapse of depression symptoms.     I will know I've met my goal when my level of my depression is reduced from 4/4 to 3/4 or better by the next review.       Objective #A (Patient Action)                          Patient will identify at least 4 stressors which contribute to feelings of depression.  Status: New - Date: 1/03/2023     Intervention(s)  Therapist will teach distraction skills. including seff soothing with the 5 senses.     Objective #B  Patient will Identify negative self-talk and behaviors: challenge core beliefs, myths, and actions.  Status: New - Date: 1/03/2023  Intervention(s)  Therapist will Introduce CBT skills.     Objective #C  Patient will Increase interest, engagement, and pleasure in doing things.  Status: New - Date: 1/03/2023  Intervention(s)  Therapist will Encourage patient to share identify and share thoughts and feelings to increase self confidence .     Goal 3: Client will will stabilize anxiety level while increasing ability to function on a daily basis.     I will know I've met my goal when my anxiety is reduced from 4/4 to 3/4 or better by the next review       Objective #A (Client Action)                Client will Increase interest, engagement, and pleasure in doing things.  Status: New - Date:1/03/2023     Intervention(s)  Therapist will provide educational materials on distraction activities.     Objective #B  Client will identify at least 4 fears / thoughts that contribute to feeling  "anxious.  Status: New - Date: 1/03/2023     Intervention(s)  Therapist will teach how to challenge negative thoughts using CBT skills including the 3 Cs.     Objective #C  Client will use thought-stopping strategy daily to reduce intrusive thoughts.  Status: New - Date:10/19/2022     Intervention(s)              Therapist will Teach how to use CBT: 3 Cs to challenge the NTs as they   present.   Objective #D (Patient Action)                          Status: New - Date: 1/03/2023     Intervention(s)  Therapist will teach emotional recognition/identification. related to behavior change.     Objective #E  Patient will use thought-stopping strategy daily to reduce intrusive thoughts.  Status: New - Date:1/03/2023  Intervention(s)  Therapist will assist the patient Identify any distortions and emotions that affect patient's productivity.     Patient has reviewed and agreed to the above plan.      Francisco Smith [facility]  January 4, 2023        [facility]                                       Iker Edge-Now Brit Edge     SAFETY PLAN: Reviewed today 3/01/2023  Step 1: Warning signs / cues (Thoughts, images, mood, situation, behavior) that a crisis may be developing:    Thoughts: \"I'm a burden\", \"I can't do this anymore\" and \"Nothing makes it better\"    Images: obsessive thoughts of death or dying: \" Suicide\", flashbacks and visions of harm: Cutting, burning self.    Thinking Processes: intrusive thoughts (bothersome, unwanted thoughts that come out of nowhere): things done in the past; memories of poor living situation for a year about 5-6 years ago. , highly critical and negative thoughts: \" I don't deserve it, I am not doing enough, not good enough\" and depersonalization    Mood: worsening depression, hopelessness, intense anger, agitation and mood swings    Behaviors: isolating/withdrawing , using alcohol, can't stop crying, aggression, not taking care of my responsibilities and increasing " "frequency and duration of dissociation    Situations: bullying: from co workers( current); schoolmates( past), public shame: how she perceives self and relationship problems   Step 2: Coping strategies - Things I can do to take my mind off of my problems without contacting another person (relaxation technique, physical activity):    Distress Tolerance Strategies:  read a book: Any and Writing; video games    Physical Activities: go for a walk, gardening and stretching     Focus on helpful thoughts:  \"This is temporary\", \"I will get through this\" and \"It always passes\"  Step 3: People and social settings that provide distraction:   Name: Jeremiah ( Partner) Phone:  [phone]   Name:  Emerita ( good friend) Phone:    [phone]    coffee shop and park   Step 4: Remind myself of people and things that are important to me and worth living for:  Partner  Step 5: When I am in crisis, I can ask these people to help me use my safety plan:  Name: Jeremiah ( Partner) Phone:  [phone]  Name:  Emerita ( good friend) Phone:    [phone]  Step 6: Making the environment safe:     remove alcohol and be around others  Step 7: Professionals or agencies I can contact during a crisis:    Suicide Prevention Lifeline: Call or Text 591   Local Crisis Services:  [facility] crisis line [phone].    Call 911 or go to my nearest emergency department.   I helped develop this safety plan and agree to use it when needed.  I have been given a copy of this plan.   Client signature _________________________________________________________________  Today s date:  1/3/2023  Completed by Provider Name/ Credentials: ANGELO Schaefer January 3, 2023  Adapted from Safety Plan Template 2008 Jazlyn Carreon and Bryan Weber is reprinted with the express permission of the authors.  No portion of the Safety Plan Template may be reproduced without the express, written permission.  You can contact the authors at [email or web address] or " [email or web address].  Answers for HPI/ROS submitted by the patient on 1/2/2023  If you checked off any problems, how difficult have these problems made it for you to do your work, take care of things at home, or get along with other people?: Somewhat difficult  PHQ9 TOTAL SCORE: 12  Answers for HPI/ROS submitted by the patient on 3/1/2023  If you checked off any problems, how difficult have these problems made it for you to do your work, take care of things at home, or get along with other people?: Not difficult at all  PHQ9 TOTAL SCORE: 12       16

## 2024-03-26 ENCOUNTER — MYC REFILL (OUTPATIENT)
Dept: FAMILY MEDICINE | Facility: CLINIC | Age: 24
End: 2024-03-26
Payer: COMMERCIAL

## 2024-03-26 DIAGNOSIS — F64.9 GENDER DYSPHORIA: ICD-10-CM

## 2024-03-26 DIAGNOSIS — F33.1 MODERATE RECURRENT MAJOR DEPRESSION (H): ICD-10-CM

## 2024-03-27 NOTE — TELEPHONE ENCOUNTER
Pending Prescriptions:                       Disp   Refills    sertraline (ZOLOFT) 50 MG tablet          90 tab*3            Sig: Take 1 tablet (50 mg) by mouth daily    spironolactone (ALDACTONE) 100 MG tablet  270 ta*3            Sig: Take 1.5 tablets (150 mg) by mouth 2 times daily    estradiol (ESTRACE) 2 MG tablet           180 ta*3            Sig: Take 1 tablet (2 mg) by mouth 2 times daily    Routing refill request to provider for review/approval because:  PHQ-9 score:        8/23/2023     8:48 AM   PHQ   PHQ-9 Total Score 2   Q9: Thoughts of better off dead/self-harm past 2 weeks Not at all              BP Readings from Last 3 Encounters:   01/25/23 126/60   02/24/20 122/78   09/30/19 120/68     [place] RN

## 2024-03-28 RX ORDER — SPIRONOLACTONE 100 MG/1
150 TABLET, FILM COATED ORAL 2 TIMES DAILY
Qty: 270 TABLET | Refills: 3 | Status: SHIPPED | OUTPATIENT
Start: 2024-03-28

## 2024-03-28 RX ORDER — ESTRADIOL 2 MG/1
2 TABLET ORAL 2 TIMES DAILY
Qty: 180 TABLET | Refills: 3 | Status: SHIPPED | OUTPATIENT
Start: 2024-03-28

## 2024-04-07 ENCOUNTER — HEALTH MAINTENANCE LETTER (OUTPATIENT)
Age: 24
End: 2024-04-07

## 2025-01-05 DIAGNOSIS — F33.1 MODERATE RECURRENT MAJOR DEPRESSION (H): ICD-10-CM

## 2025-01-05 DIAGNOSIS — F64.9 GENDER DYSPHORIA: ICD-10-CM

## 2025-01-07 RX ORDER — SPIRONOLACTONE 100 MG/1
TABLET, FILM COATED ORAL
Qty: 180 TABLET | Refills: 0 | Status: SHIPPED | OUTPATIENT
Start: 2025-01-07

## 2025-01-07 RX ORDER — ESTRADIOL 2 MG/1
2 TABLET ORAL 2 TIMES DAILY
Qty: 90 TABLET | Refills: 0 | Status: SHIPPED | OUTPATIENT
Start: 2025-01-07

## 2025-04-19 ENCOUNTER — HEALTH MAINTENANCE LETTER (OUTPATIENT)
Age: 25
End: 2025-04-19